# Patient Record
Sex: MALE | Race: WHITE | NOT HISPANIC OR LATINO | Employment: OTHER | URBAN - METROPOLITAN AREA
[De-identification: names, ages, dates, MRNs, and addresses within clinical notes are randomized per-mention and may not be internally consistent; named-entity substitution may affect disease eponyms.]

---

## 2017-01-23 DIAGNOSIS — N13.8 BPH WITH URINARY OBSTRUCTION: Primary | ICD-10-CM

## 2017-01-23 DIAGNOSIS — N40.1 BPH WITH URINARY OBSTRUCTION: Primary | ICD-10-CM

## 2017-03-10 ENCOUNTER — LAB VISIT (OUTPATIENT)
Dept: LAB | Facility: HOSPITAL | Age: 67
End: 2017-03-10
Payer: MEDICARE

## 2017-03-10 DIAGNOSIS — N40.1 BPH WITH URINARY OBSTRUCTION: ICD-10-CM

## 2017-03-10 DIAGNOSIS — N13.8 BPH WITH URINARY OBSTRUCTION: ICD-10-CM

## 2017-03-10 LAB — COMPLEXED PSA SERPL-MCNC: 1.1 NG/ML

## 2017-03-10 PROCEDURE — 84153 ASSAY OF PSA TOTAL: CPT

## 2017-03-10 PROCEDURE — 36415 COLL VENOUS BLD VENIPUNCTURE: CPT

## 2017-03-15 ENCOUNTER — OFFICE VISIT (OUTPATIENT)
Dept: UROLOGY | Facility: CLINIC | Age: 67
End: 2017-03-15
Payer: MEDICARE

## 2017-03-15 VITALS
WEIGHT: 209 LBS | DIASTOLIC BLOOD PRESSURE: 83 MMHG | HEART RATE: 64 BPM | BODY MASS INDEX: 29.26 KG/M2 | SYSTOLIC BLOOD PRESSURE: 154 MMHG | HEIGHT: 71 IN | RESPIRATION RATE: 16 BRPM

## 2017-03-15 DIAGNOSIS — N40.1 BPH WITH URINARY OBSTRUCTION: Primary | ICD-10-CM

## 2017-03-15 DIAGNOSIS — N13.8 BPH WITH URINARY OBSTRUCTION: Primary | ICD-10-CM

## 2017-03-15 DIAGNOSIS — N20.0 KIDNEY STONE: ICD-10-CM

## 2017-03-15 DIAGNOSIS — Z80.42 FAMILY HISTORY OF PROSTATE CANCER: ICD-10-CM

## 2017-03-15 LAB
BILIRUB SERPL-MCNC: ABNORMAL MG/DL
BLOOD URINE, POC: ABNORMAL
COLOR, POC UA: YELLOW
GLUCOSE UR QL STRIP: ABNORMAL
KETONES UR QL STRIP: ABNORMAL
LEUKOCYTE ESTERASE URINE, POC: ABNORMAL
NITRITE, POC UA: ABNORMAL
PH, POC UA: 6
PROTEIN, POC: ABNORMAL
SPECIFIC GRAVITY, POC UA: 1.01
UROBILINOGEN, POC UA: ABNORMAL

## 2017-03-15 PROCEDURE — 99999 PR PBB SHADOW E&M-EST. PATIENT-LVL IV: CPT | Mod: PBBFAC,,, | Performed by: NURSE PRACTITIONER

## 2017-03-15 PROCEDURE — 81002 URINALYSIS NONAUTO W/O SCOPE: CPT | Mod: PBBFAC | Performed by: NURSE PRACTITIONER

## 2017-03-15 PROCEDURE — 99214 OFFICE O/P EST MOD 30 MIN: CPT | Mod: PBBFAC | Performed by: NURSE PRACTITIONER

## 2017-03-15 PROCEDURE — 99213 OFFICE O/P EST LOW 20 MIN: CPT | Mod: S$PBB,,, | Performed by: NURSE PRACTITIONER

## 2017-03-15 NOTE — MR AVS SNAPSHOT
[facility]  [address]  [facility] [address]  Phone: [phone]                  Blayne Chavis   3/15/2017 8:20 AM   Office Visit    Description:  Male : 1950   Provider:  Shaniqua Davidson NP   Department:  [facility]           Reason for Visit     Benign Prostatic Hypertrophy           Diagnoses this Visit        Comments    BPH with urinary obstruction    -  Primary     Family history of prostate cancer         Kidney stone                To Do List           Future Appointments        Provider Department Dept Phone    3/15/2017 8:20 AM KAREN [facility] [phone]      Goals (5 Years of Data)     None      Follow-Up and Disposition     Return in about 1 year (around 3/15/2018) for PSA  and KUB.      [facility]     [facility] -  Assistance  Registered nurses in the [facility] provide clinical advisement, health education, appointment booking, and other advisory services.  Call for this free service at [phone].             Medications           Message regarding Medications     Verify the changes and/or additions to your medication regime listed below are the same as discussed with your clinician today.  If any of these changes or additions are incorrect, please notify your healthcare provider.             Verify that the below list of medications is an accurate representation of the medications you are currently taking.  If none reported, the list may be blank. If incorrect, please contact your healthcare provider. Carry this list with you in case of emergency.           Current Medications     aspirin (ECOTRIN) 325 MG EC tablet Take 325 mg by mouth once daily.    atorvastatin (LIPITOR) 40 MG tablet Take 40 mg by mouth once daily.    hydrochlorothiazide (MICROZIDE) 12.5 mg capsule Take 12.5 mg by mouth every 48 hours.      losartan (COZAAR) 25 MG tablet Take 1 tablet (25 mg  "total) by mouth once daily.    naproxen (NAPROSYN) 500 MG tablet     omeprazole (PRILOSEC) 20 MG capsule Take 20 mg by mouth once daily.      senna-docusate 8.6-50 mg (PERICOLACE) 8.6-50 mg per tablet Take 1 tablet by mouth daily as needed for Constipation.    hydrocodone-acetaminophen 10-325mg (NORCO)  mg Tab     oxycodone (ROXICODONE) 5 MG immediate release tablet Take 1 - 2 tablets (5-10mg) by mouth every 4-6 hours as needed for moderate to severe pain           Clinical Reference Information           Your Vitals Were     BP Pulse Resp Height Weight BMI    154/83 (BP Location: Right arm, Patient Position: Sitting, BP Method: Automatic) 64 16 5' 11" (1.803 m) 94.8 kg (209 lb) 29.15 kg/m2      Blood Pressure          Most Recent Value    BP  (!)  154/83      Allergies as of 3/15/2017     Sulfa (Sulfonamide Antibiotics)      Immunizations Administered on Date of Encounter - 3/15/2017     None      Orders Placed During Today's Visit      Normal Orders This Visit    POCT urine dipstick without microscope     Future Labs/Procedures Expected by Expires    Prostate Specific Antigen, Diagnostic  3/15/2018 (Approximate) 4/19/2018    X-Ray Abdomen AP 1 View  3/15/2018 (Approximate) 3/15/2018      [facility] Sign-Up     Activating your [facility] account is as easy as 1-2-3!     1) Visit [email or web address], select Sign Up Now, enter this activation code and your date of birth, then select Next.  [email or web address]  Expires: 4/29/2017  8:16 AM      2) Create a username and password to use when you visit [facility] in the future and select a security question in case you lose your password and select Next.    3) Enter your e-mail address and click Sign Up!    Additional Information  If you have questions, please e-mail [email or web address] or call [phone] to talk to our [facility] staff. Remember, [facility] is NOT to be used for urgent needs. For medical emergencies, dial 911.         Instructions                     PSA   "                1.1                 03/10/2017                 PSA                  0.89                03/14/2016                 PSA                  1.1                 07/10/2014                   PSA                      0.53                06/27/2013                 PSA                      0.94                06/30/2010                 PSA                      0.3                 04/01/2008                 PSA                      0.8                 02/14/2007                           BPH (Enlarged Prostate)  The prostate is a gland at the base of the bladder. As some men get older, the prostate may get bigger in size. This problem is called benign prostatic hyperplasia (BPH). BPH puts pressure on the urethra. This is the tube that carries urine from the bladder to the penis. It may interfere with the flow of urine. It may also keep the bladder from emptying fully.    Symptoms of BPH include trouble starting urination and feeling as though the bladder isnt emptying all the way. It also includes a weak urine stream, dribbling and leaking of urine, and frequent and urgent urination (especially at night). BPH can increase the risk of urinary infections. It can also block off urine flow completely. If this occurs, a thin tube (catheter) may be passed into the bladder to help drain urine.  If symptoms are mild, no treatment may be needed right now. If symptoms are more severe, treatment is likely needed. The goal of treatment is to improve urine flow and reduce symptoms. Treatments can include medicine and procedures. Your healthcare provider will discuss treatment options with you as needed.  Home care  The following guidelines will help you care for yourself at home:  · Urinate as soon as you feel the urge. Don't try to hold your urine.  · Don't limit your fluid intake during the day. Drink 6 to 8 glasses of water or liquids a day. This prevents bacteria from building up in the bladder.  · Avoid drinking  fluids after dinner to help reduce urination during the night.  · Avoid medicines that can worsen your symptoms. These include certain cold and allergy medicines and antidepressants. Diuretics used for high blood pressure can also worsen symptoms. Talk to your doctor about the medicines you take. Other choices may work better for you.  Prostate cancer screening  BPH does not increase the risk of prostate cancer. But because prostate cancer is a common cancer in men, screening is sometimes recommended. This may help detect the cancer in its early stages when treatment is most effective. Factors that can increase the risk of prostate cancer include being -American or having a father or brother who had prostate cancer. A high-fat diet may also increase the risk of prostate cancer. Talk to your healthcare provider to see whether you should be screened for prostate cancer.  Follow-up care  Follow up with your healthcare provider, or as advised  To learn more, go to:  · National Kidney & Urologic Diseases Information Clearinghouse  kidney.niddk.nih.gov, 153.579.6589  When to seek medical advice  Call your healthcare provider right away if any of these occur:  · Fever of 100.4°F (38.0°C) or higher, or as advised  · Unable to pass urine for 8 hours  · Increasing pressure or pain in your bladder (lower abdomen)  · Blood in the urine  · Increasing low back pain, not related to injury  · Symptoms of urinary infection (increased urge to urinate, burning when passing urine, foul-smelling urine)  Date Last Reviewed: 7/1/2016  © 1740-4793 [employer]. [address] [place] [address], [place] [address]. All rights reserved. This information is not intended as a substitute for professional medical care. Always follow your healthcare professional's instructions.        Preventing Kidney Stones  If youve had a kidney stone, you may worry that youll have another. Removing or passing your stone doesnt prevent future  stones. With your doctors help, though, you can reduce your risk of forming new stones. Follow up with your doctor to help detect new stones. You may need follow-up every 3 months to a year for a lifetime.    Drink lots of water  Staying well-hydrated is the best way to reduce your risk of future stones. Drink 8 12-ounce glasses of water daily. Have 2 with each meal and 2 between meals. Try keeping a pitcher of water nearby during the day and at night.  Take medications if needed  Medications, including vitamins and minerals, may be prescribed for certain types of stones. You may want to write your doses and medication times on a calendar. Some medications decrease stone-forming chemicals in your blood. Others help prevent those chemicals from crystallizing in urine. Still others help keep a normal acid balance in your urine.  Follow your prescribed diet  Your doctor will tell you which foods contain the chemicals you should avoid. Your doctor may also suggest talking to a dietitian. He or she can help you plan meals youll enjoy. These meals wont put you at risk for future stones. You may be told to limit certain foods, depending on which type of stones youve had. You should limit the amount of salt in your food to about 2 grams a day. This will help prevent most types of kidney stones. Make sure you get an adequate amount of calcium in your diet.  For calcium oxalate stones: Limit animal protein, such as meat, eggs, and fish. Limit grapefruit juice and alcohol. Limit high-oxalate foods (such as cola, tea, chocolate, spinach, rhubarb, wheat bran, and peanuts).  For uric acid stones: Limit high-purine foods, such as mushrooms, peas, beans, anchovies, meat, poultry, shellfish, and organ meats. These foods increase uric acid production.  For cystine stones: Limit high-methionine foods (fish is the most common, but eggs and meats, also). These foods increase production of cystine.  Date Last Reviewed: 1/5/2015  ©  6317-7906 [employer]. [address] [place] [address], [place] [address]. All rights reserved. This information is not intended as a substitute for professional medical care. Always follow your healthcare professional's instructions.             Language Assistance Services     ATTENTION: Language assistance services are available, free of charge. Please call [phone].      ATENCIÓN: Si habla español, tiene a douglas disposición servicios gratuitos de asistencia lingüística. Llame al [phone].     CHÚ Ý: N?u b?n nói Ti?ng Vi?t, có các d?ch v? h? tr? ngôn ng? mi?n phí dành cho b?n. G?i s? [phone].         [facility] complies with applicable Federal civil rights laws and does not discriminate on the basis of race, color, national origin, age, disability, or sex.

## 2017-03-15 NOTE — PROGRESS NOTES
Subjective:       Patient ID: Blayne Chavis is a 66 y.o. male.    Chief Complaint: Benign Prostatic Hypertrophy (yearly psa check)    HPI Comments: Blayne Chavis is a 66 y.o. Male with history BPH and kidney stones.  Passed Ca Ox stone 201  3/8/16 KUB showed no apparent stones.    Last clinic visit was 03/14/2016 2/16/2015 with Dr. Dao    Today here for annual exam  No urinary complaints.  Pleased with how things are going;  Going fishing this weekend.    (+) family hx of PCa (father).                     PSA                  1.1                 03/10/2017                 PSA                  0.89                03/14/2016                 PSA                  1.1                 07/10/2014                   PSA                      0.53                06/27/2013                 PSA                      0.94                06/30/2010                 PSA                      0.3                 04/01/2008                 PSA                      0.8                 02/14/2007                                     Past Medical History:    Stroke                                                          Comment:Hemorrhagic stroke -2000    GERD (gastroesophageal reflux disease)                        Hypertension                                                  Sleep apnea                                                     Comment:cannot tolerate CPAP    Past Surgical History:    APPENDECTOMY                                                   BACK SURGERY                                                     Comment:laminectomy    HIP SURGERY                                      6-9-14          Comment:right    TOE SURGERY                                     Right 2/2016          Comment:hammer toe surg    Review of patient's family history indicates:    Cancer                         Father                      Comment: prostate    Stroke                         Maternal Grandmother      Stroke                          Paternal Grandfather        Social History    Marital Status:              Spouse Name:                       Years of Education:                 Number of children:               Occupational History    None on file    Social History Main Topics    Smoking Status: Never Smoker                      Smokeless Status: Never Used                        Alcohol Use: Yes           1.2 oz/week       2 Glasses of wine per week    Drug Use: No              Sexual Activity: Not on file          Other Topics            Concern    None on file    Social History Narrative    None on file        Allergies:  Sulfa (sulfonamide antibiotics)    Medications:  Current outpatient prescriptions:   aspirin (ECOTRIN) 325 MG EC tablet, Take 325 mg by mouth once daily., Disp: , Rfl:   atorvastatin (LIPITOR) 40 MG tablet, Take 40 mg by mouth once daily., Disp: , Rfl:   hydrochlorothiazide (MICROZIDE) 12.5 mg capsule, Take 12.5 mg by mouth every 48 hours.  , Disp: , Rfl:   losartan (COZAAR) 25 MG tablet, Take 1 tablet (25 mg total) by mouth once daily., Disp: 30 tablet, Rfl: 1  omeprazole (PRILOSEC) 20 MG capsule, Take 20 mg by mouth once daily.  , Disp: , Rfl:   hydrocodone-acetaminophen 10-325mg (NORCO)  mg Tab, , Disp: , Rfl: 0  naproxen (NAPROSYN) 500 MG tablet, , Disp: , Rfl: 2  oxycodone (ROXICODONE) 5 MG immediate release tablet, Take 1 - 2 tablets (5-10mg) by mouth every 4-6 hours as needed for moderate to severe pain, Disp: 32 tablet, Rfl: 0  senna-docusate 8.6-50 mg (PERICOLACE) 8.6-50 mg per tablet, Take 1 tablet by mouth daily as needed for Constipation., Disp: , Rfl:         Review of Systems   Constitutional: Negative for activity change, appetite change and fever.   HENT: Negative.  Negative for facial swelling and trouble swallowing.    Eyes: Negative.    Respiratory: Negative.  Negative for shortness of breath.    Cardiovascular: Negative.  Negative for chest pain and palpitations.    Gastrointestinal: Negative for abdominal pain, constipation, diarrhea, nausea and vomiting.   Genitourinary: Positive for nocturia. Negative for difficulty urinating, dysuria, enuresis, flank pain, frequency, genital sores, hematuria, penile pain, scrotal swelling, testicular pain and urgency.        FOS is good;  Nocturia x 1.     Musculoskeletal: Positive for arthralgias and gait problem. Negative for back pain and neck stiffness.        (R) hip surgery     Skin: Negative.  Negative for wound.   Neurological: Positive for weakness. Negative for dizziness, tremors, seizures, syncope, speech difficulty, light-headedness and headaches.        R side weakness; hx CVA   Hematological: Does not bruise/bleed easily.   Psychiatric/Behavioral: Negative for confusion and hallucinations. The patient is not nervous/anxious.        Objective:      Physical Exam   Nursing note and vitals reviewed.  Constitutional: He is oriented to person, place, and time. Vital signs are normal. He appears well-developed and well-nourished. He is active and cooperative.  Non-toxic appearance. He does not have a sickly appearance.   Urine dipped clear of infection in the office today.     HENT:   Head: Normocephalic and atraumatic.   Right Ear: External ear normal.   Left Ear: External ear normal.   Nose: Nose normal.   Mouth/Throat: Mucous membranes are normal.   Eyes: Conjunctivae and lids are normal. No scleral icterus.   Neck: Trachea normal, normal range of motion and full passive range of motion without pain. Neck supple. No JVD present. No tracheal deviation present.   Cardiovascular: Normal rate, regular rhythm, S1 normal and S2 normal.    Pulmonary/Chest: Effort normal and breath sounds normal. No respiratory distress. He exhibits no tenderness.   Abdominal: Soft. Normal appearance and bowel sounds are normal. There is no hepatosplenomegaly. There is no tenderness. There is no rebound, no guarding and no CVA tenderness.    Genitourinary: Rectum normal, testes normal and penis normal. Rectal exam shows no external hemorrhoid, no mass and no tenderness. Prostate is enlarged. Prostate is not tender. No penile tenderness.       Musculoskeletal: Normal range of motion.   Lymphadenopathy: No inguinal adenopathy noted on the right or left side.   Neurological: He is alert and oriented to person, place, and time. He has normal strength.   Skin: Skin is warm, dry and intact.     Psychiatric: He has a normal mood and affect. His behavior is normal. Judgment and thought content normal.       Assessment:       1. BPH with urinary obstruction    2. Family history of prostate cancer    3. Kidney stone        Plan:         I spent 20 minutes with the patient of which more than half was spent in direct consultation with the patient in regards to our treatment and plan.    Education and recommendations of today's plan of care including home remedies.  Diet modifications; good water intake  RTC one year with PSA and KUB

## 2017-03-15 NOTE — PATIENT INSTRUCTIONS
PSA                  1.1                 03/10/2017                 PSA                  0.89                03/14/2016                 PSA                  1.1                 07/10/2014                   PSA                      0.53                06/27/2013                 PSA                      0.94                06/30/2010                 PSA                      0.3                 04/01/2008                 PSA                      0.8                 02/14/2007                           BPH (Enlarged Prostate)  The prostate is a gland at the base of the bladder. As some men get older, the prostate may get bigger in size. This problem is called benign prostatic hyperplasia (BPH). BPH puts pressure on the urethra. This is the tube that carries urine from the bladder to the penis. It may interfere with the flow of urine. It may also keep the bladder from emptying fully.    Symptoms of BPH include trouble starting urination and feeling as though the bladder isnt emptying all the way. It also includes a weak urine stream, dribbling and leaking of urine, and frequent and urgent urination (especially at night). BPH can increase the risk of urinary infections. It can also block off urine flow completely. If this occurs, a thin tube (catheter) may be passed into the bladder to help drain urine.  If symptoms are mild, no treatment may be needed right now. If symptoms are more severe, treatment is likely needed. The goal of treatment is to improve urine flow and reduce symptoms. Treatments can include medicine and procedures. Your healthcare provider will discuss treatment options with you as needed.  Home care  The following guidelines will help you care for yourself at home:  · Urinate as soon as you feel the urge. Don't try to hold your urine.  · Don't limit your fluid intake during the day. Drink 6 to 8 glasses of water or liquids a day. This prevents bacteria from building up in the  bladder.  · Avoid drinking fluids after dinner to help reduce urination during the night.  · Avoid medicines that can worsen your symptoms. These include certain cold and allergy medicines and antidepressants. Diuretics used for high blood pressure can also worsen symptoms. Talk to your doctor about the medicines you take. Other choices may work better for you.  Prostate cancer screening  BPH does not increase the risk of prostate cancer. But because prostate cancer is a common cancer in men, screening is sometimes recommended. This may help detect the cancer in its early stages when treatment is most effective. Factors that can increase the risk of prostate cancer include being -American or having a father or brother who had prostate cancer. A high-fat diet may also increase the risk of prostate cancer. Talk to your healthcare provider to see whether you should be screened for prostate cancer.  Follow-up care  Follow up with your healthcare provider, or as advised  To learn more, go to:  · National Kidney & Urologic Diseases Information Clearinghouse  kidney.niddk.nih.gov, 631.216.1901  When to seek medical advice  Call your healthcare provider right away if any of these occur:  · Fever of 100.4°F (38.0°C) or higher, or as advised  · Unable to pass urine for 8 hours  · Increasing pressure or pain in your bladder (lower abdomen)  · Blood in the urine  · Increasing low back pain, not related to injury  · Symptoms of urinary infection (increased urge to urinate, burning when passing urine, foul-smelling urine)  Date Last Reviewed: 7/1/2016 © 2000-2016 [employer]. [address] [place] [address], [place] [address]. All rights reserved. This information is not intended as a substitute for professional medical care. Always follow your healthcare professional's instructions.        Preventing Kidney Stones  If youve had a kidney stone, you may worry that youll have another. Removing or passing your stone  doesnt prevent future stones. With your doctors help, though, you can reduce your risk of forming new stones. Follow up with your doctor to help detect new stones. You may need follow-up every 3 months to a year for a lifetime.    Drink lots of water  Staying well-hydrated is the best way to reduce your risk of future stones. Drink 8 12-ounce glasses of water daily. Have 2 with each meal and 2 between meals. Try keeping a pitcher of water nearby during the day and at night.  Take medications if needed  Medications, including vitamins and minerals, may be prescribed for certain types of stones. You may want to write your doses and medication times on a calendar. Some medications decrease stone-forming chemicals in your blood. Others help prevent those chemicals from crystallizing in urine. Still others help keep a normal acid balance in your urine.  Follow your prescribed diet  Your doctor will tell you which foods contain the chemicals you should avoid. Your doctor may also suggest talking to a dietitian. He or she can help you plan meals youll enjoy. These meals wont put you at risk for future stones. You may be told to limit certain foods, depending on which type of stones youve had. You should limit the amount of salt in your food to about 2 grams a day. This will help prevent most types of kidney stones. Make sure you get an adequate amount of calcium in your diet.  For calcium oxalate stones: Limit animal protein, such as meat, eggs, and fish. Limit grapefruit juice and alcohol. Limit high-oxalate foods (such as cola, tea, chocolate, spinach, rhubarb, wheat bran, and peanuts).  For uric acid stones: Limit high-purine foods, such as mushrooms, peas, beans, anchovies, meat, poultry, shellfish, and organ meats. These foods increase uric acid production.  For cystine stones: Limit high-methionine foods (fish is the most common, but eggs and meats, also). These foods increase production of cystine.  Date Last  Reviewed: 1/5/2015  © 4221-8122 [employer], [employer]. [address] [place] [address], [place] [address]. All rights reserved. This information is not intended as a substitute for professional medical care. Always follow your healthcare professional's instructions.

## 2018-03-08 ENCOUNTER — HOSPITAL ENCOUNTER (OUTPATIENT)
Dept: RADIOLOGY | Facility: HOSPITAL | Age: 68
Discharge: HOME | End: 2018-03-08
Attending: NURSE PRACTITIONER
Payer: MEDICARE

## 2018-03-08 DIAGNOSIS — N20.0 KIDNEY STONE: ICD-10-CM

## 2018-03-08 PROCEDURE — 74018 RADEX ABDOMEN 1 VIEW: CPT | Mod: TC,PO

## 2018-03-08 PROCEDURE — 74018 RADEX ABDOMEN 1 VIEW: CPT | Mod: 26,,, | Performed by: RADIOLOGY

## 2018-04-03 ENCOUNTER — OFFICE VISIT (OUTPATIENT)
Dept: UROLOGY | Facility: CLINIC | Age: 68
End: 2018-04-03
Payer: MEDICARE

## 2018-04-03 VITALS
HEART RATE: 66 BPM | SYSTOLIC BLOOD PRESSURE: 151 MMHG | BODY MASS INDEX: 29.26 KG/M2 | DIASTOLIC BLOOD PRESSURE: 76 MMHG | WEIGHT: 209 LBS | HEIGHT: 71 IN

## 2018-04-03 DIAGNOSIS — N40.1 BPH WITH URINARY OBSTRUCTION: Primary | ICD-10-CM

## 2018-04-03 DIAGNOSIS — N20.0 KIDNEY STONES: ICD-10-CM

## 2018-04-03 DIAGNOSIS — Z80.42 FAMILY HISTORY OF PROSTATE CANCER: ICD-10-CM

## 2018-04-03 DIAGNOSIS — N13.8 BPH WITH URINARY OBSTRUCTION: Primary | ICD-10-CM

## 2018-04-03 LAB
BILIRUB SERPL-MCNC: NORMAL MG/DL
BLOOD URINE, POC: NORMAL
COLOR, POC UA: YELLOW
GLUCOSE UR QL STRIP: NORMAL
KETONES UR QL STRIP: NORMAL
LEUKOCYTE ESTERASE URINE, POC: NORMAL
NITRITE, POC UA: NORMAL
PH, POC UA: 5
PROTEIN, POC: NORMAL
SPECIFIC GRAVITY, POC UA: 1.02
UROBILINOGEN, POC UA: NORMAL

## 2018-04-03 PROCEDURE — 99214 OFFICE O/P EST MOD 30 MIN: CPT | Mod: PBBFAC | Performed by: NURSE PRACTITIONER

## 2018-04-03 PROCEDURE — 99214 OFFICE O/P EST MOD 30 MIN: CPT | Mod: S$PBB,,, | Performed by: NURSE PRACTITIONER

## 2018-04-03 PROCEDURE — 99999 PR PBB SHADOW E&M-EST. PATIENT-LVL IV: CPT | Mod: PBBFAC,,, | Performed by: NURSE PRACTITIONER

## 2018-04-03 PROCEDURE — 81002 URINALYSIS NONAUTO W/O SCOPE: CPT | Mod: PBBFAC | Performed by: NURSE PRACTITIONER

## 2018-04-03 NOTE — PROGRESS NOTES
Subjective:       Patient ID: Blayne Chavis is a 67 y.o. male.    Chief Complaint: Annual Exam    Blayne Chavis is a 66 y.o. Male with history BPH and kidney stones.  Passed Ca Ox stone 201  3/8/16 KUB showed no apparent stones.    Last clinic visit was 03/15/2017  2/16/2015 with Dr. Dao    Today here for annual exam  No urinary complaints.  Pleased with how things are going;  Going fishing this weekend.    (+) family hx of PCa (father).                   PSA                  0.98                03/08/2018                 PSA                  1.1                 03/10/2017                 PSA                  0.89                03/14/2016                 PSA                  1.1                 07/10/2014                   PSA                      0.53                06/27/2013                 PSA                      0.94                06/30/2010                 PSA                      0.3                 04/01/2008                 PSA                      0.8                 02/14/2007                                     Past Medical History:    Stroke                                                          Comment:Hemorrhagic stroke -2000    GERD (gastroesophageal reflux disease)                        Hypertension                                                  Sleep apnea                                                     Comment:cannot tolerate CPAP    Past Surgical History:    APPENDECTOMY                                                   BACK SURGERY                                                     Comment:laminectomy    HIP SURGERY                                      6-9-14          Comment:right    TOE SURGERY                                     Right 2/2016          Comment:hammer toe surg    Review of patient's family history indicates:    Cancer                         Father                      Comment: prostate    Stroke                         Maternal Grandmother      Stroke                          Paternal Grandfather        Social History    Marital Status:              Spouse Name:                       Years of Education:                 Number of children:               Occupational History    None on file    Social History Main Topics    Smoking Status: Never Smoker                      Smokeless Status: Never Used                        Alcohol Use: Yes           1.2 oz/week       2 Glasses of wine per week    Drug Use: No              Sexual Activity: Not on file          Other Topics            Concern    None on file    Social History Narrative    None on file        Allergies:  Sulfa (sulfonamide antibiotics)    Medications:  Current outpatient prescriptions:   aspirin (ECOTRIN) 325 MG EC tablet, Take 325 mg by mouth once daily., Disp: , Rfl:   atorvastatin (LIPITOR) 40 MG tablet, Take 40 mg by mouth once daily., Disp: , Rfl:   hydrochlorothiazide (MICROZIDE) 12.5 mg capsule, Take 12.5 mg by mouth every 48 hours.  , Disp: , Rfl:   losartan (COZAAR) 25 MG tablet, Take 1 tablet (25 mg total) by mouth once daily., Disp: 30 tablet, Rfl: 1  omeprazole (PRILOSEC) 20 MG capsule, Take 20 mg by mouth once daily.  , Disp: , Rfl:   hydrocodone-acetaminophen 10-325mg (NORCO)  mg Tab, , Disp: , Rfl: 0  naproxen (NAPROSYN) 500 MG tablet, , Disp: , Rfl: 2  oxycodone (ROXICODONE) 5 MG immediate release tablet, Take 1 - 2 tablets (5-10mg) by mouth every 4-6 hours as needed for moderate to severe pain, Disp: 32 tablet, Rfl: 0  senna-docusate 8.6-50 mg (PERICOLACE) 8.6-50 mg per tablet, Take 1 tablet by mouth daily as needed for Constipation., Disp: , Rfl:           Review of Systems    Objective:      Physical Exam   Nursing note and vitals reviewed.  Constitutional: He is oriented to person, place, and time. He appears well-developed and well-nourished.  Non-toxic appearance. He does not have a sickly appearance.   Urine clear;     HENT:   Head: Normocephalic and  atraumatic.   Right Ear: External ear normal.   Left Ear: External ear normal.   Nose: Nose normal.   Mouth/Throat: Mucous membranes are normal.   Eyes: Conjunctivae and lids are normal. No scleral icterus.   Neck: Trachea normal, normal range of motion and full passive range of motion without pain. Neck supple. No JVD present. No tracheal deviation present.   Cardiovascular: Normal rate, S1 normal and S2 normal.    Pulmonary/Chest: Effort normal. No respiratory distress. He exhibits no tenderness.   Abdominal: Soft. Normal appearance and bowel sounds are normal. There is no hepatosplenomegaly. There is no tenderness. There is no CVA tenderness.   Genitourinary: Rectum normal, testes normal and penis normal. Rectal exam shows no external hemorrhoid, no mass and no tenderness. Prostate is enlarged. Prostate is not tender. Right testis shows no mass, no swelling and no tenderness. Left testis shows no mass, no swelling and no tenderness. No phimosis, paraphimosis, hypospadias, penile erythema or penile tenderness. No discharge found.       Musculoskeletal:   (R) arm weakness   Lymphadenopathy: No inguinal adenopathy noted on the right or left side.   Neurological: He is alert and oriented to person, place, and time. He has normal strength.   Skin: Skin is warm, dry and intact.     Psychiatric: He has a normal mood and affect. His behavior is normal. Judgment and thought content normal.       Assessment:       1. BPH with urinary obstruction    2. Kidney stones    3. Family history of prostate cancer        Plan:         I spent 25 minutes with the patient of which more than half was spent in direct consultation with the patient in regards to our treatment and plan.    Education and recommendations of today's plan of care including home remedies.  RTC one year with PSA and KUB  Diet modifications and daily exercise  \

## 2018-04-03 NOTE — PATIENT INSTRUCTIONS
PSA                  0.98                03/08/2018                 PSA                  1.1                 03/10/2017                 PSA                  0.89                03/14/2016                 PSA                  1.1                 07/10/2014                   PSA                      0.53                06/27/2013                 PSA                      0.94                06/30/2010                 PSA                      0.3                 04/01/2008                 PSA                      0.8                 02/14/2007                             BPH (Enlarged Prostate)  The prostate is a gland at the base of the bladder. As some men get older, the prostate may get bigger in size. This problem is called benign prostatic hyperplasia (BPH). BPH puts pressure on the urethra. This is the tube that carries urine from the bladder to the penis. It may interfere with the flow of urine. It may also keep the bladder from emptying fully.    Symptoms of BPH include trouble starting urination and feeling as though the bladder isnt emptying all the way. It also includes a weak urine stream, dribbling and leaking of urine, and frequent and urgent urination (especially at night). BPH can increase the risk of urinary infections. It can also block off urine flow completely. If this occurs, a thin tube (catheter) may be passed into the bladder to help drain urine.  If symptoms are mild, no treatment may be needed right now. If symptoms are more severe, treatment is likely needed. The goal of treatment is to improve urine flow and reduce symptoms. Treatments can include medicine and procedures. Your healthcare provider will discuss treatment options with you as needed.  Home care  The following guidelines will help you care for yourself at home:  · Urinate as soon as you feel the urge. Don't try to hold your urine.  · Don't limit your fluid intake during the day. Drink 6 to 8 glasses of water or  liquids a day. This prevents bacteria from building up in the bladder.  · Avoid drinking fluids after dinner to help reduce urination during the night.  · Avoid medicines that can worsen your symptoms. These include certain cold and allergy medicines and antidepressants. Diuretics used for high blood pressure can also worsen symptoms. Talk to your doctor about the medicines you take. Other choices may work better for you.  Prostate cancer screening  BPH does not increase the risk of prostate cancer. But because prostate cancer is a common cancer in men, screening is sometimes recommended. This may help detect the cancer in its early stages when treatment is most effective. Factors that can increase the risk of prostate cancer include being -American or having a father or brother who had prostate cancer. A high-fat diet may also increase the risk of prostate cancer. Talk to your healthcare provider to see whether you should be screened for prostate cancer.  Follow-up care  Follow up with your healthcare provider, or as advised  To learn more, go to:  · National Kidney & Urologic Diseases Information Clearinghouse  kidney.niddk.nih.gov, 236.981.3490  When to seek medical advice  Call your healthcare provider right away if any of these occur:  · Fever of 100.4°F (38.0°C) or higher, or as advised  · Unable to pass urine for 8 hours  · Increasing pressure or pain in your bladder (lower abdomen)  · Blood in the urine  · Increasing low back pain, not related to injury  · Symptoms of urinary infection (increased urge to urinate, burning when passing urine, foul-smelling urine)  Date Last Reviewed: 7/1/2016  © 2965-9206 [employer], [employer]. [address] [place] [address], [place] [address]. All rights reserved. This information is not intended as a substitute for professional medical care. Always follow your healthcare professional's instructions.        Preventing Kidney Stones  If youve had a kidney stone, you may  worry that youll have another. Removing or passing your stone doesnt prevent future stones. But with your healthcare providers help, you can reduce your risk of forming new stones. Follow up with your healthcare provider to help find new stones. You may need follow-up every 3 months to a year for a lifetime.    Drink lots of water  Staying well-hydrated is the best way to reduce your risk of future stones. Drink 8 12-ounce glasses of water daily. Have 2 with each meal and 2 between meals. Try keeping a pitcher of water nearby during the day and at night.  Take medicines if needed  Medicines, including vitamins and minerals, may be prescribed for certain types of stones. You may want to write your doses and medicine times on a calendar. Some medicines decrease stone-forming chemicals in your blood. Others help prevent those chemicals from crystallizing in urine. Still others help keep a normal acid balance in your urine.  Follow your prescribed diet  Your healthcare provider will tell you which foods contain the chemicals you should avoid. Your healthcare provider may also suggest talking to a dietitian. He or she can help you plan meals youll enjoy. These meals wont put you at risk for future stones. You may be told to limit certain foods, depending on which type of stones youve had. You should limit the amount of salt in your food to about 2 grams a day. This will help prevent most types of kidney stones. Make sure you get an adequate amount of calcium in your diet.  For calcium oxalate stones: Limit animal protein, such as meat, eggs, and fish. Limit grapefruit juice and alcohol. Limit high-oxalate foods (such as cola, tea, chocolate, spinach, rhubarb, wheat bran, and peanuts).  For uric acid stones: Limit high-purine foods, such as mushrooms, peas, beans, anchovies, meat, poultry, shellfish, and organ meats. These foods increase uric acid production.  For cystine stones: Limit high-methionine foods (fish is  the most common, but eggs and meats, also). These foods increase production of cystine.  Date Last Reviewed: 2/1/2017 © 2000-2017 [employer]. [address] [place] [address], [place] [address]. All rights reserved. This information is not intended as a substitute for professional medical care. Always follow your healthcare professional's instructions.        Understanding Kidney Stones  Your kidneys are bean-shaped organs. They help filter extra salts, waste, and water from your body. You need to drink enough water every day to help flush the extra salts into your urine.     What are kidney stones?  Kidney stones are made up of chemical crystals that separate out from urine. These crystals clump together to make stones. They form in the calyx of the kidney. They may stay in the kidney or move into the urinary tract.   Why kidney stones form  Kidneys form stones for many reasons. If you dont drink enough water, for instance, you wont have enough urine to dilute chemicals. Then the chemicals may form crystals, which can develop into stones. Here are some reasons why kidney stones form:  · Fluid loss (dehydration). This can concentrate urine, causing stones to form.  · Certain foods. Some foods contain large amounts of the chemicals that sometimes crystallize into stones. Eating foods that contain a lot of meat or salt can lead to more kidney stones.  · Kidney infections. These infections foster stones by slowing urine flow or changing the acid balance of your urine.  · Family history. If family members have had kidney stones, youre more likely to have them, too.  · A lack of certain substances in your urine. Some substances can help protect you from forming stones. If you dont have enough of these in your urine, stone formation can increase.  Where stones form  Stones begin in the cup-shaped part of the kidney (calyx). Some stay in the calyx and grow. Others move into the kidney, pelvis, or into the ureter.  There they can lodge, block the flow of urine, and cause pain.  Symptoms  Many stones cause sudden and severe pain and bloody urine. Others cause nausea or frequent, burning urination. Symptoms often depend on your stones size and location. Fever may indicate a serious infection. Call your healthcare provider right away if you develop a fever.  Date Last Reviewed: 1/1/2017  © 9553-0723 [employer], [employer]. [address] [place] [address], [place] [address]. All rights reserved. This information is not intended as a substitute for professional medical care. Always follow your healthcare professional's instructions.

## 2019-03-27 ENCOUNTER — HOSPITAL ENCOUNTER (OUTPATIENT)
Dept: RADIOLOGY | Facility: HOSPITAL | Age: 69
Discharge: HOME | End: 2019-03-27
Attending: NURSE PRACTITIONER
Payer: MEDICARE

## 2019-03-27 DIAGNOSIS — N20.0 KIDNEY STONES: ICD-10-CM

## 2019-03-27 PROCEDURE — 74018 RADEX ABDOMEN 1 VIEW: CPT | Mod: 26,,, | Performed by: RADIOLOGY

## 2019-03-27 PROCEDURE — 74018 RADEX ABDOMEN 1 VIEW: CPT | Mod: TC,PO

## 2019-03-27 PROCEDURE — 74018 XR ABDOMEN AP 1 VIEW: ICD-10-PCS | Mod: 26,,, | Performed by: RADIOLOGY

## 2019-04-03 ENCOUNTER — OFFICE VISIT (OUTPATIENT)
Dept: UROLOGY | Facility: CLINIC | Age: 69
End: 2019-04-03
Payer: MEDICARE

## 2019-04-03 VITALS
HEIGHT: 71 IN | BODY MASS INDEX: 30.49 KG/M2 | DIASTOLIC BLOOD PRESSURE: 82 MMHG | WEIGHT: 217.81 LBS | SYSTOLIC BLOOD PRESSURE: 144 MMHG

## 2019-04-03 DIAGNOSIS — Z87.442 HISTORY OF KIDNEY STONES: ICD-10-CM

## 2019-04-03 DIAGNOSIS — Z12.5 SCREENING FOR PROSTATE CANCER: ICD-10-CM

## 2019-04-03 DIAGNOSIS — N40.0 ENLARGED PROSTATE WITHOUT LOWER URINARY TRACT SYMPTOMS (LUTS): ICD-10-CM

## 2019-04-03 DIAGNOSIS — N40.0 BENIGN PROSTATIC HYPERPLASIA, UNSPECIFIED WHETHER LOWER URINARY TRACT SYMPTOMS PRESENT: Primary | ICD-10-CM

## 2019-04-03 PROCEDURE — 99999 PR PBB SHADOW E&M-EST. PATIENT-LVL III: ICD-10-PCS | Mod: PBBFAC,,, | Performed by: NURSE PRACTITIONER

## 2019-04-03 PROCEDURE — 81002 URINALYSIS NONAUTO W/O SCOPE: CPT | Mod: PBBFAC | Performed by: NURSE PRACTITIONER

## 2019-04-03 PROCEDURE — 99213 OFFICE O/P EST LOW 20 MIN: CPT | Mod: PBBFAC | Performed by: NURSE PRACTITIONER

## 2019-04-03 PROCEDURE — 99999 PR PBB SHADOW E&M-EST. PATIENT-LVL III: CPT | Mod: PBBFAC,,, | Performed by: NURSE PRACTITIONER

## 2019-04-03 PROCEDURE — 99214 OFFICE O/P EST MOD 30 MIN: CPT | Mod: S$PBB,,, | Performed by: NURSE PRACTITIONER

## 2019-04-03 PROCEDURE — 99214 PR OFFICE/OUTPT VISIT, EST, LEVL IV, 30-39 MIN: ICD-10-PCS | Mod: S$PBB,,, | Performed by: NURSE PRACTITIONER

## 2019-04-03 NOTE — PROGRESS NOTES
Subjective:       Patient ID: Blayne Chavis is a 68 y.o. male.    Chief Complaint: BPH, and PSA    HPI   Blayne Chavis is a 68 y.o. male new patient to me (records of past medical, family and social history personally reviewed by me), w/ h/o stroke, HTN, HLD, BPH, kidney stones (CaOx stone 12/12/12). Positive family h/o prostate CA in father. Pt last seen in clinic w/ Davidson, NP 4/3/18 for f/u BPH and kidney stones.    Today pt returns to clinic for f/u BPH, and PSA. He reports feeling well since last visit. He denies irritative/obstructive urinary sxs. Reports FOS remain strong. Nocturia 0-1/night.    Review of Systems   Constitutional: Negative for chills and fever.   Eyes: Negative for visual disturbance.   Respiratory: Negative for shortness of breath.    Cardiovascular: Negative for chest pain and palpitations.   Gastrointestinal: Negative for abdominal pain, constipation, nausea and vomiting.   Endocrine: Negative for polyuria.   Genitourinary: Negative for decreased urine volume, difficulty urinating, discharge, dysuria, flank pain, frequency, hematuria, scrotal swelling, testicular pain and urgency.   Musculoskeletal: Negative for back pain.   Skin: Negative for rash.   Neurological: Negative for dizziness and headaches.   Psychiatric/Behavioral: Negative for behavioral problems.       Objective:       UA dip in clinic today was unremarkable.    4/3/18 KUB was unremarkable for calculi.    Lab Results   Component Value Date    PSA 0.53 06/27/2013    PSA 0.94 06/30/2010    PSA 0.3 04/01/2008    PSA 0.8 02/14/2007    PSADIAG 1.3 03/27/2019    PSADIAG 0.98 03/08/2018    PSADIAG 1.1 03/10/2017    PSADIAG 0.89 03/14/2016    PSADIAG 1.1 07/10/2014     Physical Exam   Vitals reviewed.  Constitutional: He is oriented to person, place, and time. He appears well-developed and well-nourished. No distress.   HENT:   Head: Normocephalic.   Eyes: Conjunctivae are normal. No scleral icterus.   Neck: Normal range  of motion.   Pulmonary/Chest: Effort normal. No respiratory distress.   Abdominal: Soft. He exhibits no distension. There is no tenderness. There is no rebound. Hernia confirmed negative in the right inguinal area and confirmed negative in the left inguinal area.   Genitourinary: Testes normal and penis normal. Rectal exam shows no external hemorrhoid. Prostate is enlarged. Prostate is not tender. Right testis shows no mass, no swelling and no tenderness. Left testis shows no mass, no swelling and no tenderness.   Genitourinary Comments: The prostate is enlarged, smooth, symmetrical, without nodule or induration. The seminal vesicles were normal and nonpalpable. The prostate was not tender or boggy. Rectal tone was normal no rectal mass was palpable.     Musculoskeletal: He exhibits no edema.   Lymphadenopathy: No inguinal adenopathy noted on the right or left side.   Neurological: He is alert and oriented to person, place, and time.   Skin: Skin is warm.     Psychiatric: He has a normal mood and affect. His behavior is normal.       Assessment:         1. Benign prostatic hyperplasia, unspecified whether lower urinary tract symptoms present    2. History of kidney stones    3. Screening for prostate cancer        Plan:       I spent 30 minutes with the patient of which more than half was spent in direct consultation with the patient in regards to our treatment and plan.    Discussed and reviewed BPH. Reviewed lower urinary anatomy. Discussed prostate normally enlarges to some degree in all men with advancing age, but not all men require treatment. Men with BPH may or may not have symptoms. Urinary symptoms include irritative (i.e., frequency, urgency) and/or obstructive (i.e., hesitancy, slow stream) symptoms. Pt remain asymptomatic at this time, therefore medication is not clinically indicated.    Discussed and reviewed the natural history of renal/ureteral calculi and prevention of calculi. Discussed and reviewed  importance of prevention w/ proper hydration (2-3L/d), low sodium diet, low oxalate/animal protein intake, balanced nutritional calcium intake, and increase citrate intake w/ fresh lemon. Patient expressed and verbalized understanding.  Patient understands to contact clinic or report to local ED if clinic is closed for fevers, rigors, unmanageable pain, intractable nausea/vomiting and/or intolerable irritative voiding symptoms.    Discussed and reviewed most recent PSA value - w/in normal range. Reviewed prostate CA screening w/ PSA, and RAQUEL. Reviewed benefits, potential burdens, risks and/or harms related to screening.    F/u 1 yr as scheduled w/ PSA, FABIO, and KUB prior to visit.    Education sheets provided.    Patient verbalized and expressed understanding, and agree w/ plan.

## 2019-04-03 NOTE — PATIENT INSTRUCTIONS
Preventing Kidney Stones  If youve had a kidney stone, you may worry that youll have another. Removing or passing your stone doesnt prevent future stones. But with your healthcare providers help, you can reduce your risk of forming new stones. Follow up with your healthcare provider to help find new stones. You may need follow-up every 3 months to a year for a lifetime.    Drink lots of water  Staying well-hydrated is the best way to reduce your risk of future stones. Drink 8 12-ounce glasses of water daily. Have 2 with each meal and 2 between meals. Try keeping a pitcher of water nearby during the day and at night.  Take medicines if needed  Medicines, including vitamins and minerals, may be prescribed for certain types of stones. You may want to write your doses and medicine times on a calendar. Some medicines decrease stone-forming chemicals in your blood. Others help prevent those chemicals from crystallizing in urine. Still others help keep a normal acid balance in your urine.  Follow your prescribed diet  Your healthcare provider will tell you which foods contain the chemicals you should avoid. Your healthcare provider may also suggest talking to a dietitian. He or she can help you plan meals youll enjoy. These meals wont put you at risk for future stones. You may be told to limit certain foods, depending on which type of stones youve had. You should limit the amount of salt in your food to about 2 grams a day. This will help prevent most types of kidney stones. Make sure you get an adequate amount of calcium in your diet.  For calcium oxalate stones: Limit animal protein, such as meat, eggs, and fish. Limit grapefruit juice and alcohol. Limit high-oxalate foods (such as cola, tea, chocolate, spinach, rhubarb, wheat bran, and peanuts).  For uric acid stones: Limit high-purine foods, such as mushrooms, peas, beans, anchovies, meat, poultry, shellfish, and organ meats. These foods increase uric acid  production.  For cystine stones: Limit high-methionine foods (fish is the most common, but eggs and meats, also). These foods increase production of cystine.  Date Last Reviewed: 2/1/2017 © 2000-2017 [employer]. [address] [place] [address], [place] [address]. All rights reserved. This information is not intended as a substitute for professional medical care. Always follow your healthcare professional's instructions.        Understanding Kidney Stones  Your kidneys are bean-shaped organs. They help filter extra salts, waste, and water from your body. You need to drink enough water every day to help flush the extra salts into your urine.     What are kidney stones?  Kidney stones are made up of chemical crystals that separate out from urine. These crystals clump together to make stones. They form in the calyx of the kidney. They may stay in the kidney or move into the urinary tract.   Why kidney stones form  Kidneys form stones for many reasons. If you dont drink enough water, for instance, you wont have enough urine to dilute chemicals. Then the chemicals may form crystals, which can develop into stones. Here are some reasons why kidney stones form:  · Fluid loss (dehydration). This can concentrate urine, causing stones to form.  · Certain foods. Some foods contain large amounts of the chemicals that sometimes crystallize into stones. Eating foods that contain a lot of meat or salt can lead to more kidney stones.  · Kidney infections. These infections foster stones by slowing urine flow or changing the acid balance of your urine.  · Family history. If family members have had kidney stones, youre more likely to have them, too.  · A lack of certain substances in your urine. Some substances can help protect you from forming stones. If you dont have enough of these in your urine, stone formation can increase.  Where stones form  Stones begin in the cup-shaped part of the kidney (calyx). Some stay in the  calyx and grow. Others move into the kidney, pelvis, or into the ureter. There they can lodge, block the flow of urine, and cause pain.  Symptoms  Many stones cause sudden and severe pain and bloody urine. Others cause nausea or frequent, burning urination. Symptoms often depend on your stones size and location. Fever may indicate a serious infection. Call your healthcare provider right away if you develop a fever.  Date Last Reviewed: 1/1/2017 © 2000-2017 [employer]. [address] [place] [id number]. All rights reserved. This information is not intended as a substitute for professional medical care. Always follow your healthcare professional's instructions.        Prostate-Specific Antigen (PSA)  Does this test have other names?  PSA  What is this test?  This test measures the level of prostate-specific antigen (PSA) in your blood.  The cells of the prostate gland make the protein called PSA. Men normally have low levels of PSA. If your PSA levels start to rise, it could mean you have prostate cancer, benign prostate conditions, inflammation, or an infection.  PSA testing is controversial because the U.S. Preventative Services Task Force discourages men who don't have any symptoms of prostate cancer from being screened. The task force says that PSA test results can lead to treating small cancers that would never become life-threatening.  But the American Cancer Society believes men should be told the risks and benefits of PSA testing and allowed to make their own decision about if and when to be screened.  The American Urologic Society says that PSA screening is most important between the ages of 55 and 69. If you have a brother or father with prostate cancer or you are , you should start testing at age 40.   Why do I need this test?  You may have this test if you are 50 or older and your healthcare provider wants to screen you for prostate cancer. Some providers recommend  screening at age 40 or 45 if you have a family history of prostate cancer or other risk factors.  You may also have this test if you have already been diagnosed with prostate cancer, so that your healthcare provider can monitor your treatment and see whether your cancer has come back.  What other tests might I have along with this test?  Your healthcare provider may also do a digital rectal exam (RAQUEL). A RAQUEL is a physical exam of the prostate, not a lab test. For the exam, your provider will place a gloved finger in your rectum and feel the prostate to check for any bumps or abnormal areas. The FDA recommends that a RAQUEL be done along with a PSA test.  What do my test results mean?  Many things may affect your lab test results. These include the method each lab uses to do the test. Even if your test results are different from the normal value, you may not have a problem. To learn what the results mean for you, talk with your healthcare provider.  Results are given in nanograms per milliliter ng/mL. Normal results are below 4.0 ng/mL. A rising PSA may mean that you have cancer. But the PSA results alone won't tell your healthcare provider whether it's cancer or a benign prostate condition. If your provider suspects cancer, he or she will likely suggest that you have a biopsy of the prostate to make the diagnosis.  How is this test done?  The test requires a blood sample, which is drawn through a needle from a vein in your arm.  Does this test pose any risks?  Taking a blood sample with a needle carries risks that include bleeding, infection, bruising, or feeling dizzy. When the needle pricks your arm, you may feel a slight stinging sensation or pain. Afterward, the site may be slightly sore.  What might affect my test results?  An infection can affect your results, as can certain medicines. Riding a bicycle and ejaculation may also affect your results.  How do I get ready for this test?  You may need to abstain from  sex and not ride a bicycle within 1 to 2 days of the test. In addition, be sure your healthcare provider knows about all medicines, herbs, vitamins, and supplements you are taking. This includes medicines that don't need a prescription and any illicit drugs you may use.     © 8892-1267 [employer]. [address] [place] [address], [place] [address]. All rights reserved. This information is not intended as a substitute for professional medical care. Always follow your healthcare professional's instructions.

## 2020-03-30 ENCOUNTER — HOSPITAL ENCOUNTER (OUTPATIENT)
Dept: RADIOLOGY | Facility: HOSPITAL | Age: 70
Discharge: HOME | End: 2020-03-30
Attending: NURSE PRACTITIONER
Payer: MEDICARE

## 2020-03-30 DIAGNOSIS — Z87.442 HISTORY OF KIDNEY STONES: ICD-10-CM

## 2020-03-30 PROCEDURE — 74018 RADEX ABDOMEN 1 VIEW: CPT | Mod: TC,PO

## 2020-03-30 PROCEDURE — 74018 RADEX ABDOMEN 1 VIEW: CPT | Mod: 26,,, | Performed by: RADIOLOGY

## 2020-03-30 PROCEDURE — 74018 XR ABDOMEN AP 1 VIEW: ICD-10-PCS | Mod: 26,,, | Performed by: RADIOLOGY

## 2020-03-31 ENCOUNTER — TELEPHONE (OUTPATIENT)
Dept: UROLOGY | Facility: CLINIC | Age: 70
End: 2020-03-31

## 2020-03-31 DIAGNOSIS — N40.1 BPH WITH URINARY OBSTRUCTION: ICD-10-CM

## 2020-03-31 DIAGNOSIS — Z80.42 FAMILY HISTORY OF PROSTATE CANCER IN FATHER: ICD-10-CM

## 2020-03-31 DIAGNOSIS — Z87.442 HISTORY OF NEPHROLITHIASIS: Primary | ICD-10-CM

## 2020-03-31 DIAGNOSIS — N13.8 BPH WITH URINARY OBSTRUCTION: ICD-10-CM

## 2020-03-31 NOTE — TELEPHONE ENCOUNTER
Notified pt of unremarkable KUB findings. PSA w/in normal range. F/U in 1 year with PSA, KUB and FABIO prior. Pt voiced understanding      ----- Message from Kiel Day DNP sent at 3/31/2020  8:58 AM CDT -----  Please inform pt of unremarkable KUB findings. PSA w/in normal range. May f/u in one yr (recall) w/ PSA, KUB, and FABIO.    If pt desires virtual visit, that's an option.    Thx

## 2021-03-22 ENCOUNTER — LAB VISIT (OUTPATIENT)
Dept: LAB | Facility: HOSPITAL | Age: 71
End: 2021-03-22
Payer: MEDICARE

## 2021-03-22 DIAGNOSIS — N13.8 BPH WITH URINARY OBSTRUCTION: ICD-10-CM

## 2021-03-22 DIAGNOSIS — Z80.42 FAMILY HISTORY OF PROSTATE CANCER IN FATHER: ICD-10-CM

## 2021-03-22 DIAGNOSIS — N40.1 BPH WITH URINARY OBSTRUCTION: ICD-10-CM

## 2021-03-22 LAB — COMPLEXED PSA SERPL-MCNC: 1.2 NG/ML (ref 0–4)

## 2021-03-22 PROCEDURE — 84153 ASSAY OF PSA TOTAL: CPT | Performed by: NURSE PRACTITIONER

## 2021-03-22 PROCEDURE — 36415 COLL VENOUS BLD VENIPUNCTURE: CPT | Performed by: NURSE PRACTITIONER

## 2021-03-30 ENCOUNTER — OFFICE VISIT (OUTPATIENT)
Dept: UROLOGY | Facility: CLINIC | Age: 71
End: 2021-03-30
Payer: MEDICARE

## 2021-03-30 VITALS
HEIGHT: 71 IN | HEART RATE: 71 BPM | BODY MASS INDEX: 30.96 KG/M2 | DIASTOLIC BLOOD PRESSURE: 97 MMHG | SYSTOLIC BLOOD PRESSURE: 159 MMHG | WEIGHT: 221.13 LBS

## 2021-03-30 DIAGNOSIS — I10 HYPERTENSION, UNSPECIFIED TYPE: Chronic | ICD-10-CM

## 2021-03-30 DIAGNOSIS — Z86.73 HISTORY OF STROKE: Chronic | ICD-10-CM

## 2021-03-30 DIAGNOSIS — Z80.42 FAMILY HISTORY OF PROSTATE CANCER: ICD-10-CM

## 2021-03-30 DIAGNOSIS — N20.0 KIDNEY STONE: Primary | ICD-10-CM

## 2021-03-30 PROCEDURE — 99999 PR PBB SHADOW E&M-EST. PATIENT-LVL III: CPT | Mod: PBBFAC,,, | Performed by: UROLOGY

## 2021-03-30 PROCEDURE — 99999 PR PBB SHADOW E&M-EST. PATIENT-LVL III: ICD-10-PCS | Mod: PBBFAC,,, | Performed by: UROLOGY

## 2021-03-30 PROCEDURE — 99213 PR OFFICE/OUTPT VISIT, EST, LEVL III, 20-29 MIN: ICD-10-PCS | Mod: S$PBB,,, | Performed by: UROLOGY

## 2021-03-30 PROCEDURE — 99213 OFFICE O/P EST LOW 20 MIN: CPT | Mod: S$PBB,,, | Performed by: UROLOGY

## 2021-03-30 PROCEDURE — 99213 OFFICE O/P EST LOW 20 MIN: CPT | Mod: PBBFAC | Performed by: UROLOGY

## 2021-03-31 ENCOUNTER — HOSPITAL ENCOUNTER (OUTPATIENT)
Dept: RADIOLOGY | Facility: HOSPITAL | Age: 71
Discharge: HOME | End: 2021-03-31
Attending: UROLOGY
Payer: MEDICARE

## 2021-03-31 DIAGNOSIS — N20.0 KIDNEY STONE: ICD-10-CM

## 2021-03-31 PROCEDURE — 76770 US KIDNEY: ICD-10-PCS | Mod: 26,,, | Performed by: RADIOLOGY

## 2021-03-31 PROCEDURE — 76770 US EXAM ABDO BACK WALL COMP: CPT | Mod: 26,,, | Performed by: RADIOLOGY

## 2021-03-31 PROCEDURE — 76770 US EXAM ABDO BACK WALL COMP: CPT | Mod: TC

## 2021-03-31 PROCEDURE — 74018 XR ABDOMEN AP 1 VIEW: ICD-10-PCS | Mod: 26,,, | Performed by: RADIOLOGY

## 2021-03-31 PROCEDURE — 74018 RADEX ABDOMEN 1 VIEW: CPT | Mod: TC

## 2021-03-31 PROCEDURE — 74018 RADEX ABDOMEN 1 VIEW: CPT | Mod: 26,,, | Performed by: RADIOLOGY

## 2021-04-16 ENCOUNTER — PATIENT MESSAGE (OUTPATIENT)
Dept: RESEARCH | Facility: HOSPITAL | Age: 71
End: 2021-04-16

## 2021-04-19 ENCOUNTER — PATIENT MESSAGE (OUTPATIENT)
Dept: UROLOGY | Facility: CLINIC | Age: 71
End: 2021-04-19

## 2021-05-06 ENCOUNTER — TELEPHONE (OUTPATIENT)
Dept: UROLOGY | Facility: CLINIC | Age: 71
End: 2021-05-06

## 2021-05-10 ENCOUNTER — HOSPITAL ENCOUNTER (OUTPATIENT)
Dept: RADIOLOGY | Facility: HOSPITAL | Age: 71
Discharge: HOME | End: 2021-05-10
Attending: UROLOGY
Payer: MEDICARE

## 2021-05-10 ENCOUNTER — OFFICE VISIT (OUTPATIENT)
Dept: UROLOGY | Facility: CLINIC | Age: 71
End: 2021-05-10
Payer: MEDICARE

## 2021-05-10 VITALS
HEIGHT: 71 IN | BODY MASS INDEX: 29.91 KG/M2 | HEART RATE: 95 BPM | WEIGHT: 213.63 LBS | DIASTOLIC BLOOD PRESSURE: 93 MMHG | SYSTOLIC BLOOD PRESSURE: 163 MMHG

## 2021-05-10 DIAGNOSIS — N20.0 RENAL CALCULI: Primary | ICD-10-CM

## 2021-05-10 DIAGNOSIS — N22 CALCULUS OF URINARY TRACT IN DISEASES CLASSIFIED ELSEWHERE: ICD-10-CM

## 2021-05-10 DIAGNOSIS — N20.0 RENAL CALCULI: ICD-10-CM

## 2021-05-10 PROCEDURE — 74018 RADEX ABDOMEN 1 VIEW: CPT | Mod: TC

## 2021-05-10 PROCEDURE — 99999 PR PBB SHADOW E&M-EST. PATIENT-LVL V: ICD-10-PCS | Mod: PBBFAC,,, | Performed by: UROLOGY

## 2021-05-10 PROCEDURE — 99215 OFFICE O/P EST HI 40 MIN: CPT | Mod: PBBFAC,25 | Performed by: UROLOGY

## 2021-05-10 PROCEDURE — 99999 PR PBB SHADOW E&M-EST. PATIENT-LVL V: CPT | Mod: PBBFAC,,, | Performed by: UROLOGY

## 2021-05-10 PROCEDURE — 74018 RADEX ABDOMEN 1 VIEW: CPT | Mod: 26,,, | Performed by: RADIOLOGY

## 2021-05-10 PROCEDURE — 99214 OFFICE O/P EST MOD 30 MIN: CPT | Mod: S$PBB,,, | Performed by: UROLOGY

## 2021-05-10 PROCEDURE — 99214 PR OFFICE/OUTPT VISIT, EST, LEVL IV, 30-39 MIN: ICD-10-PCS | Mod: S$PBB,,, | Performed by: UROLOGY

## 2021-05-10 PROCEDURE — 74018 XR ABDOMEN AP 1 VIEW: ICD-10-PCS | Mod: 26,,, | Performed by: RADIOLOGY

## 2021-05-10 RX ORDER — HYDROCODONE BITARTRATE AND ACETAMINOPHEN 5; 325 MG/1; MG/1
1 TABLET ORAL EVERY 6 HOURS PRN
Qty: 15 TABLET | Refills: 0 | Status: SHIPPED | OUTPATIENT
Start: 2021-05-10

## 2021-05-10 RX ORDER — TAMSULOSIN HYDROCHLORIDE 0.4 MG/1
0.4 CAPSULE ORAL DAILY
Qty: 30 CAPSULE | Refills: 12 | Status: SHIPPED | OUTPATIENT
Start: 2021-05-10

## 2021-05-11 ENCOUNTER — HOSPITAL ENCOUNTER (EMERGENCY)
Facility: HOSPITAL | Age: 71
Discharge: HOME | End: 2021-05-11
Attending: EMERGENCY MEDICINE
Payer: MEDICARE

## 2021-05-11 ENCOUNTER — TELEPHONE (OUTPATIENT)
Dept: UROLOGY | Facility: CLINIC | Age: 71
End: 2021-05-11

## 2021-05-11 VITALS
WEIGHT: 210 LBS | BODY MASS INDEX: 29.4 KG/M2 | TEMPERATURE: 98 F | HEART RATE: 70 BPM | RESPIRATION RATE: 14 BRPM | HEIGHT: 71 IN | OXYGEN SATURATION: 94 % | SYSTOLIC BLOOD PRESSURE: 130 MMHG | DIASTOLIC BLOOD PRESSURE: 70 MMHG

## 2021-05-11 DIAGNOSIS — N20.1 LEFT URETERAL STONE: Primary | ICD-10-CM

## 2021-05-11 DIAGNOSIS — R10.9 LEFT FLANK PAIN: ICD-10-CM

## 2021-05-11 LAB
ALBUMIN SERPL BCP-MCNC: 3.7 G/DL (ref 3.5–5.2)
ALP SERPL-CCNC: 88 U/L (ref 55–135)
ALT SERPL W/O P-5'-P-CCNC: 38 U/L (ref 10–44)
ANION GAP SERPL CALC-SCNC: 11 MMOL/L (ref 8–16)
AST SERPL-CCNC: 21 U/L (ref 10–40)
BACTERIA #/AREA URNS AUTO: NORMAL /HPF
BASOPHILS # BLD AUTO: 0.03 K/UL (ref 0–0.2)
BASOPHILS NFR BLD: 0.4 % (ref 0–1.9)
BILIRUB SERPL-MCNC: 0.9 MG/DL (ref 0.1–1)
BILIRUB UR QL STRIP: NEGATIVE
BUN SERPL-MCNC: 17 MG/DL (ref 8–23)
CALCIUM SERPL-MCNC: 9.6 MG/DL (ref 8.7–10.5)
CHLORIDE SERPL-SCNC: 103 MMOL/L (ref 95–110)
CLARITY UR REFRACT.AUTO: CLEAR
CO2 SERPL-SCNC: 22 MMOL/L (ref 23–29)
COLOR UR AUTO: YELLOW
CREAT SERPL-MCNC: 1.1 MG/DL (ref 0.5–1.4)
DIFFERENTIAL METHOD: ABNORMAL
EOSINOPHIL # BLD AUTO: 0.1 K/UL (ref 0–0.5)
EOSINOPHIL NFR BLD: 0.6 % (ref 0–8)
ERYTHROCYTE [DISTWIDTH] IN BLOOD BY AUTOMATED COUNT: 12.9 % (ref 11.5–14.5)
EST. GFR  (AFRICAN AMERICAN): >60 ML/MIN/1.73 M^2
EST. GFR  (NON AFRICAN AMERICAN): >60 ML/MIN/1.73 M^2
GLUCOSE SERPL-MCNC: 168 MG/DL (ref 70–110)
GLUCOSE UR QL STRIP: ABNORMAL
HCT VFR BLD AUTO: 42.6 % (ref 40–54)
HCV AB SERPL QL IA: NEGATIVE
HGB BLD-MCNC: 14.4 G/DL (ref 14–18)
HGB UR QL STRIP: ABNORMAL
IMM GRANULOCYTES # BLD AUTO: 0.01 K/UL (ref 0–0.04)
IMM GRANULOCYTES NFR BLD AUTO: 0.1 % (ref 0–0.5)
KETONES UR QL STRIP: NEGATIVE
LEUKOCYTE ESTERASE UR QL STRIP: NEGATIVE
LIPASE SERPL-CCNC: 16 U/L (ref 4–60)
LYMPHOCYTES # BLD AUTO: 0.7 K/UL (ref 1–4.8)
LYMPHOCYTES NFR BLD: 9.3 % (ref 18–48)
MCH RBC QN AUTO: 29.2 PG (ref 27–31)
MCHC RBC AUTO-ENTMCNC: 33.8 G/DL (ref 32–36)
MCV RBC AUTO: 86 FL (ref 82–98)
MICROSCOPIC COMMENT: NORMAL
MONOCYTES # BLD AUTO: 0.8 K/UL (ref 0.3–1)
MONOCYTES NFR BLD: 10.4 % (ref 4–15)
NEUTROPHILS # BLD AUTO: 6.1 K/UL (ref 1.8–7.7)
NEUTROPHILS NFR BLD: 79.2 % (ref 38–73)
NITRITE UR QL STRIP: NEGATIVE
NRBC BLD-RTO: 0 /100 WBC
PH UR STRIP: 6 [PH] (ref 5–8)
PLATELET # BLD AUTO: 159 K/UL (ref 150–450)
PMV BLD AUTO: 10.2 FL (ref 9.2–12.9)
POTASSIUM SERPL-SCNC: 4.3 MMOL/L (ref 3.5–5.1)
PROT SERPL-MCNC: 7 G/DL (ref 6–8.4)
PROT UR QL STRIP: NEGATIVE
RBC # BLD AUTO: 4.93 M/UL (ref 4.6–6.2)
RBC #/AREA URNS AUTO: 1 /HPF (ref 0–4)
SODIUM SERPL-SCNC: 136 MMOL/L (ref 136–145)
SP GR UR STRIP: 1.01 (ref 1–1.03)
URN SPEC COLLECT METH UR: ABNORMAL
WBC # BLD AUTO: 7.72 K/UL (ref 3.9–12.7)
WBC #/AREA URNS AUTO: 1 /HPF (ref 0–5)

## 2021-05-11 PROCEDURE — 81001 URINALYSIS AUTO W/SCOPE: CPT | Performed by: EMERGENCY MEDICINE

## 2021-05-11 PROCEDURE — 80053 COMPREHEN METABOLIC PANEL: CPT | Performed by: EMERGENCY MEDICINE

## 2021-05-11 PROCEDURE — 86803 HEPATITIS C AB TEST: CPT | Performed by: EMERGENCY MEDICINE

## 2021-05-11 PROCEDURE — 99285 EMERGENCY DEPT VISIT HI MDM: CPT | Mod: 25

## 2021-05-11 PROCEDURE — 99284 EMERGENCY DEPT VISIT MOD MDM: CPT | Mod: ,,, | Performed by: EMERGENCY MEDICINE

## 2021-05-11 PROCEDURE — 83690 ASSAY OF LIPASE: CPT | Performed by: EMERGENCY MEDICINE

## 2021-05-11 PROCEDURE — 63600175 PHARM REV CODE 636 W HCPCS: Performed by: EMERGENCY MEDICINE

## 2021-05-11 PROCEDURE — 99284 PR EMERGENCY DEPT VISIT,LEVEL IV: ICD-10-PCS | Mod: ,,, | Performed by: EMERGENCY MEDICINE

## 2021-05-11 PROCEDURE — 96374 THER/PROPH/DIAG INJ IV PUSH: CPT

## 2021-05-11 PROCEDURE — 85025 COMPLETE CBC W/AUTO DIFF WBC: CPT | Performed by: EMERGENCY MEDICINE

## 2021-05-11 RX ORDER — KETOROLAC TROMETHAMINE 30 MG/ML
10 INJECTION, SOLUTION INTRAMUSCULAR; INTRAVENOUS
Status: COMPLETED | OUTPATIENT
Start: 2021-05-11 | End: 2021-05-11

## 2021-05-11 RX ORDER — HYDROCODONE BITARTRATE AND ACETAMINOPHEN 5; 325 MG/1; MG/1
1 TABLET ORAL EVERY 4 HOURS PRN
Qty: 8 TABLET | Refills: 0 | Status: SHIPPED | OUTPATIENT
Start: 2021-05-11

## 2021-05-11 RX ORDER — ONDANSETRON 4 MG/1
4 TABLET, ORALLY DISINTEGRATING ORAL EVERY 8 HOURS PRN
Qty: 10 TABLET | Refills: 0 | Status: SHIPPED | OUTPATIENT
Start: 2021-05-11

## 2021-05-11 RX ORDER — IBUPROFEN 400 MG/1
400 TABLET ORAL EVERY 6 HOURS PRN
Qty: 20 TABLET | Refills: 0 | Status: SHIPPED | OUTPATIENT
Start: 2021-05-11

## 2021-05-11 RX ADMIN — KETOROLAC TROMETHAMINE 10 MG: 30 INJECTION, SOLUTION INTRAMUSCULAR; INTRAVENOUS at 12:05

## 2021-05-12 ENCOUNTER — PES CALL (OUTPATIENT)
Dept: ADMINISTRATIVE | Facility: CLINIC | Age: 71
End: 2021-05-12

## 2021-10-08 ENCOUNTER — OFFICE VISIT (OUTPATIENT)
Dept: ORTHOPEDICS | Facility: CLINIC | Age: 71
End: 2021-10-08
Payer: MEDICARE

## 2021-10-08 ENCOUNTER — HOSPITAL ENCOUNTER (OUTPATIENT)
Dept: RADIOLOGY | Facility: HOSPITAL | Age: 71
Discharge: HOME | End: 2021-10-08
Attending: NURSE PRACTITIONER
Payer: MEDICARE

## 2021-10-08 DIAGNOSIS — M25.559 HIP PAIN: Primary | ICD-10-CM

## 2021-10-08 DIAGNOSIS — M25.559 HIP PAIN: ICD-10-CM

## 2021-10-08 DIAGNOSIS — M54.31 SCIATICA OF RIGHT SIDE: ICD-10-CM

## 2021-10-08 PROCEDURE — 72170 X-RAY EXAM OF PELVIS: CPT | Mod: TC

## 2021-10-08 PROCEDURE — 99999 PR PBB SHADOW E&M-EST. PATIENT-LVL II: ICD-10-PCS | Mod: PBBFAC,,, | Performed by: NURSE PRACTITIONER

## 2021-10-08 PROCEDURE — 72170 X-RAY EXAM OF PELVIS: CPT | Mod: 26,,, | Performed by: RADIOLOGY

## 2021-10-08 PROCEDURE — 99212 OFFICE O/P EST SF 10 MIN: CPT | Mod: PBBFAC | Performed by: NURSE PRACTITIONER

## 2021-10-08 PROCEDURE — 99203 OFFICE O/P NEW LOW 30 MIN: CPT | Mod: S$PBB,,, | Performed by: NURSE PRACTITIONER

## 2021-10-08 PROCEDURE — 99999 PR PBB SHADOW E&M-EST. PATIENT-LVL II: CPT | Mod: PBBFAC,,, | Performed by: NURSE PRACTITIONER

## 2021-10-08 PROCEDURE — 99203 PR OFFICE/OUTPT VISIT, NEW, LEVL III, 30-44 MIN: ICD-10-PCS | Mod: S$PBB,,, | Performed by: NURSE PRACTITIONER

## 2021-10-08 PROCEDURE — 72170 XR PELVIS ROUTINE AP: ICD-10-PCS | Mod: 26,,, | Performed by: RADIOLOGY

## 2021-10-08 RX ORDER — METHYLPREDNISOLONE 4 MG/1
TABLET ORAL
Qty: 1 PACKAGE | Refills: 0 | Status: SHIPPED | OUTPATIENT
Start: 2021-10-08 | End: 2021-10-29

## 2024-04-09 ENCOUNTER — HOSPITAL ENCOUNTER (INPATIENT)
Facility: HOSPITAL | Age: 74
LOS: 5 days | Discharge: NURSING, REHAB OR LONG-TERM CARE | DRG: 481 | End: 2024-04-14
Attending: STUDENT IN AN ORGANIZED HEALTH CARE EDUCATION/TRAINING PROGRAM | Admitting: STUDENT IN AN ORGANIZED HEALTH CARE EDUCATION/TRAINING PROGRAM
Payer: MEDICARE

## 2024-04-09 DIAGNOSIS — M97.01XA PERIPROSTHETIC FRACTURE AROUND INTERNAL PROSTHETIC RIGHT HIP JOINT, INITIAL ENCOUNTER: ICD-10-CM

## 2024-04-09 DIAGNOSIS — S72.331A CLOSED DISPLACED OBLIQUE FRACTURE OF SHAFT OF RIGHT FEMUR, INITIAL ENCOUNTER: Primary | ICD-10-CM

## 2024-04-09 DIAGNOSIS — R07.9 CHEST PAIN: ICD-10-CM

## 2024-04-09 DIAGNOSIS — S72.009A HIP FRACTURE: ICD-10-CM

## 2024-04-09 DIAGNOSIS — Z86.73 HISTORY OF CVA (CEREBROVASCULAR ACCIDENT): ICD-10-CM

## 2024-04-09 PROBLEM — Z01.818 PREOPERATIVE EXAMINATION: Status: ACTIVE | Noted: 2024-04-09

## 2024-04-09 PROBLEM — Z66 DNR (DO NOT RESUSCITATE): Status: ACTIVE | Noted: 2024-04-09

## 2024-04-09 PROBLEM — E11.9 CONTROLLED TYPE 2 DIABETES MELLITUS: Chronic | Status: ACTIVE | Noted: 2024-04-09

## 2024-04-09 PROBLEM — S72.90XA CLOSED FRACTURE OF FEMUR: Status: ACTIVE | Noted: 2024-04-09

## 2024-04-09 PROCEDURE — 11000001 HC ACUTE MED/SURG PRIVATE ROOM

## 2024-04-09 PROCEDURE — 25000003 PHARM REV CODE 250: Performed by: STUDENT IN AN ORGANIZED HEALTH CARE EDUCATION/TRAINING PROGRAM

## 2024-04-09 PROCEDURE — 93010 ELECTROCARDIOGRAM REPORT: CPT | Mod: ,,, | Performed by: INTERNAL MEDICINE

## 2024-04-09 PROCEDURE — 93005 ELECTROCARDIOGRAM TRACING: CPT

## 2024-04-09 RX ORDER — MUPIROCIN 20 MG/G
OINTMENT TOPICAL 2 TIMES DAILY
Status: DISCONTINUED | OUTPATIENT
Start: 2024-04-09 | End: 2024-04-14 | Stop reason: HOSPADM

## 2024-04-09 RX ORDER — AMLODIPINE BESYLATE 10 MG/1
10 TABLET ORAL DAILY
Status: DISCONTINUED | OUTPATIENT
Start: 2024-04-10 | End: 2024-04-14 | Stop reason: HOSPADM

## 2024-04-09 RX ORDER — ALUMINUM HYDROXIDE, MAGNESIUM HYDROXIDE, AND SIMETHICONE 1200; 120; 1200 MG/30ML; MG/30ML; MG/30ML
30 SUSPENSION ORAL 4 TIMES DAILY PRN
Status: DISCONTINUED | OUTPATIENT
Start: 2024-04-09 | End: 2024-04-14 | Stop reason: HOSPADM

## 2024-04-09 RX ORDER — PREGABALIN 75 MG/1
75 CAPSULE ORAL NIGHTLY
Status: DISCONTINUED | OUTPATIENT
Start: 2024-04-09 | End: 2024-04-11 | Stop reason: HOSPADM

## 2024-04-09 RX ORDER — HYDROCODONE BITARTRATE AND ACETAMINOPHEN 5; 325 MG/1; MG/1
1 TABLET ORAL EVERY 6 HOURS PRN
Status: DISCONTINUED | OUTPATIENT
Start: 2024-04-10 | End: 2024-04-10

## 2024-04-09 RX ORDER — LOSARTAN POTASSIUM 100 MG/1
1 TABLET ORAL DAILY
COMMUNITY
Start: 2024-03-27

## 2024-04-09 RX ORDER — NALOXONE HCL 0.4 MG/ML
0.02 VIAL (ML) INJECTION
Status: DISCONTINUED | OUTPATIENT
Start: 2024-04-09 | End: 2024-04-14 | Stop reason: HOSPADM

## 2024-04-09 RX ORDER — SODIUM CHLORIDE 9 MG/ML
INJECTION, SOLUTION INTRAVENOUS CONTINUOUS
Status: DISCONTINUED | OUTPATIENT
Start: 2024-04-09 | End: 2024-04-10

## 2024-04-09 RX ORDER — GLYCERIN 1 G/1
1 SUPPOSITORY RECTAL ONCE AS NEEDED
Status: DISCONTINUED | OUTPATIENT
Start: 2024-04-09 | End: 2024-04-14 | Stop reason: HOSPADM

## 2024-04-09 RX ORDER — SIMETHICONE 80 MG
1 TABLET,CHEWABLE ORAL 4 TIMES DAILY PRN
Status: DISCONTINUED | OUTPATIENT
Start: 2024-04-09 | End: 2024-04-14 | Stop reason: HOSPADM

## 2024-04-09 RX ORDER — SODIUM CHLORIDE 0.9 % (FLUSH) 0.9 %
1-10 SYRINGE (ML) INJECTION EVERY 12 HOURS PRN
Status: DISCONTINUED | OUTPATIENT
Start: 2024-04-09 | End: 2024-04-14 | Stop reason: HOSPADM

## 2024-04-09 RX ORDER — ONDANSETRON HYDROCHLORIDE 2 MG/ML
4 INJECTION, SOLUTION INTRAVENOUS EVERY 12 HOURS PRN
Status: DISCONTINUED | OUTPATIENT
Start: 2024-04-09 | End: 2024-04-14 | Stop reason: HOSPADM

## 2024-04-09 RX ORDER — BISACODYL 10 MG/1
10 SUPPOSITORY RECTAL DAILY PRN
Status: DISCONTINUED | OUTPATIENT
Start: 2024-04-09 | End: 2024-04-14 | Stop reason: HOSPADM

## 2024-04-09 RX ORDER — ACETAMINOPHEN 500 MG
1000 TABLET ORAL EVERY 8 HOURS
Status: COMPLETED | OUTPATIENT
Start: 2024-04-09 | End: 2024-04-10

## 2024-04-09 RX ORDER — GLUCAGON 1 MG
1 KIT INJECTION
Status: DISCONTINUED | OUTPATIENT
Start: 2024-04-09 | End: 2024-04-14 | Stop reason: HOSPADM

## 2024-04-09 RX ORDER — ACETAMINOPHEN 325 MG/1
650 TABLET ORAL EVERY 4 HOURS PRN
Status: DISCONTINUED | OUTPATIENT
Start: 2024-04-09 | End: 2024-04-09

## 2024-04-09 RX ORDER — ACETAMINOPHEN 325 MG/1
650 TABLET ORAL EVERY 8 HOURS PRN
Status: DISCONTINUED | OUTPATIENT
Start: 2024-04-09 | End: 2024-04-09

## 2024-04-09 RX ORDER — AMLODIPINE BESYLATE 10 MG/1
10 TABLET ORAL
COMMUNITY
Start: 2024-03-27

## 2024-04-09 RX ORDER — IBUPROFEN 200 MG
24 TABLET ORAL
Status: DISCONTINUED | OUTPATIENT
Start: 2024-04-09 | End: 2024-04-14 | Stop reason: HOSPADM

## 2024-04-09 RX ORDER — IBUPROFEN 200 MG
16 TABLET ORAL
Status: DISCONTINUED | OUTPATIENT
Start: 2024-04-09 | End: 2024-04-14 | Stop reason: HOSPADM

## 2024-04-09 RX ORDER — SODIUM CHLORIDE 0.9 % (FLUSH) 0.9 %
10 SYRINGE (ML) INJECTION
Status: DISCONTINUED | OUTPATIENT
Start: 2024-04-09 | End: 2024-04-14 | Stop reason: HOSPADM

## 2024-04-09 RX ORDER — TAMSULOSIN HYDROCHLORIDE 0.4 MG/1
0.4 CAPSULE ORAL DAILY
Status: DISCONTINUED | OUTPATIENT
Start: 2024-04-10 | End: 2024-04-14 | Stop reason: HOSPADM

## 2024-04-09 RX ORDER — ATORVASTATIN CALCIUM 80 MG/1
40 TABLET, FILM COATED ORAL DAILY
COMMUNITY
Start: 2024-03-27

## 2024-04-09 RX ORDER — ATORVASTATIN CALCIUM 40 MG/1
40 TABLET, FILM COATED ORAL DAILY
Status: DISCONTINUED | OUTPATIENT
Start: 2024-04-10 | End: 2024-04-14 | Stop reason: HOSPADM

## 2024-04-09 RX ORDER — TALC
6 POWDER (GRAM) TOPICAL NIGHTLY PRN
Status: DISCONTINUED | OUTPATIENT
Start: 2024-04-09 | End: 2024-04-13

## 2024-04-09 RX ORDER — TIZANIDINE 4 MG/1
2 TABLET ORAL 2 TIMES DAILY PRN
Status: ON HOLD | COMMUNITY
Start: 2023-09-27 | End: 2024-04-14 | Stop reason: HOSPADM

## 2024-04-09 RX ADMIN — ACETAMINOPHEN 1000 MG: 500 TABLET ORAL at 09:04

## 2024-04-09 RX ADMIN — SODIUM CHLORIDE: 9 INJECTION, SOLUTION INTRAVENOUS at 09:04

## 2024-04-09 RX ADMIN — PREGABALIN 75 MG: 75 CAPSULE ORAL at 09:04

## 2024-04-09 RX ADMIN — Medication 6 MG: at 09:04

## 2024-04-09 RX ADMIN — MUPIROCIN: 20 OINTMENT TOPICAL at 09:04

## 2024-04-09 NOTE — PROVIDER TRANSFER
Outside Transfer Acceptance Note / Regional Referral Center    Referring facility: [facility]   Referring provider: SONAL SALAZAR  Accepting facility: [facility]  Accepting provider: Cale Fernandez MD  Admitting provider: Cale Fernandez MD  Reason for transfer: Higher Level of Care   Transfer diagnosis: hip fracture, right  Transfer specialty requested: Orthopedic Surgery  Transfer specialty notified: Yes  Transfer level: NUMBER 1-5: 2  Bed type requested: med/surg  Isolation status: No active isolations   Admission class or status: IP - inpatient      Narrative     Blayne Chavis is a 74yo M with a PMH of HTN, hx of CVA, hx of right hip arthoplasty in 2016, GERD, and YARELI who presented to the [facility] ED on 4/9/2024 with complains of right hip pain. Pt was in his kitchen on morning of admission and stubbed his toe, afterwards falling on his right hip and also hitting his head. EMS was called to bring pt to the ER.    In the ED, vitals showed HR 95, /76, RR 22, and satting 95% on RA. Labs grossly unremarkable. CTH without acute findings. XR series revealed right-sided proximal trochanteric fracture with displacement and communented component. Per ED MD, neuro exam WNL.    [facility] contacted to facilitate transfer for orthopedic services, and Dr. Sanders with [facility] agreed to consult. Pt accepted to  at [facility] for further care and management.    Objective     Vitals:    Recent Labs: All pertinent labs within the past 24 hours have been reviewed.  Recent imaging: Reviewed     Allergies:   Review of patient's allergies indicates:   Allergen Reactions    Sulfa (sulfonamide antibiotics) Other (See Comments)      NPO: No    Anticoagulation:   Anticoagulants       None             Instructions      [facility]-  Admit to Hospital Medicine  Upon patient arrival to floor, please send SecureChat to [facility] or call extension [phone] (if no answer, do NOT leave a callback number after the  beep, rather please send a SecureChat to [facility]), for Hospital Medicine admit team assignment and for additional admit orders for the patient.  Do not page the attending physician associated with the patient on arrival (this physician may not be on duty at the time of arrival).  Rather, always send a SecureChat to [facility] or call [phone] to reach the triage physician for orders and team assignment.      Cale Fernandez MD  Attending Physician  Medical Director - [facility] Observation Unit  Department of Hospital Medicine  4/9/2024

## 2024-04-10 ENCOUNTER — ANESTHESIA EVENT (OUTPATIENT)
Dept: SURGERY | Facility: HOSPITAL | Age: 74
DRG: 481 | End: 2024-04-10
Payer: MEDICARE

## 2024-04-10 PROBLEM — K59.01 SLOW TRANSIT CONSTIPATION: Status: ACTIVE | Noted: 2024-04-10

## 2024-04-10 PROBLEM — Z80.42 FAMILY HISTORY OF PROSTATE CANCER: Status: RESOLVED | Noted: 2021-03-30 | Resolved: 2024-04-10

## 2024-04-10 PROBLEM — E11.9 CONTROLLED TYPE 2 DIABETES MELLITUS WITHOUT COMPLICATION, WITHOUT LONG-TERM CURRENT USE OF INSULIN: Status: ACTIVE | Noted: 2024-04-09

## 2024-04-10 PROBLEM — Z96.649 PERIPROSTHETIC FRACTURE AROUND INTERNAL PROSTHETIC HIP JOINT: Status: ACTIVE | Noted: 2024-04-09

## 2024-04-10 PROBLEM — M97.8XXA PERIPROSTHETIC FRACTURE AROUND INTERNAL PROSTHETIC HIP JOINT: Status: ACTIVE | Noted: 2024-04-09

## 2024-04-10 LAB
ABO + RH BLD: NORMAL
ANION GAP SERPL CALC-SCNC: 10 MMOL/L (ref 8–16)
BASOPHILS # BLD AUTO: 0.01 K/UL (ref 0–0.2)
BASOPHILS NFR BLD: 0.2 % (ref 0–1.9)
BLD GP AB SCN CELLS X3 SERPL QL: NORMAL
BUN SERPL-MCNC: 28 MG/DL (ref 8–23)
CALCIUM SERPL-MCNC: 8.6 MG/DL (ref 8.7–10.5)
CHLORIDE SERPL-SCNC: 110 MMOL/L (ref 95–110)
CO2 SERPL-SCNC: 19 MMOL/L (ref 23–29)
CREAT SERPL-MCNC: 0.9 MG/DL (ref 0.5–1.4)
DIFFERENTIAL METHOD BLD: ABNORMAL
EOSINOPHIL # BLD AUTO: 0 K/UL (ref 0–0.5)
EOSINOPHIL NFR BLD: 0 % (ref 0–8)
ERYTHROCYTE [DISTWIDTH] IN BLOOD BY AUTOMATED COUNT: 12.9 % (ref 11.5–14.5)
EST. GFR  (NO RACE VARIABLE): >60 ML/MIN/1.73 M^2
GLUCOSE SERPL-MCNC: 126 MG/DL (ref 70–110)
HCT VFR BLD AUTO: 38 % (ref 40–54)
HGB BLD-MCNC: 12.4 G/DL (ref 14–18)
IMM GRANULOCYTES # BLD AUTO: 0.01 K/UL (ref 0–0.04)
IMM GRANULOCYTES NFR BLD AUTO: 0.2 % (ref 0–0.5)
LYMPHOCYTES # BLD AUTO: 0.6 K/UL (ref 1–4.8)
LYMPHOCYTES NFR BLD: 9.6 % (ref 18–48)
MAGNESIUM SERPL-MCNC: 2.3 MG/DL (ref 1.6–2.6)
MCH RBC QN AUTO: 29.1 PG (ref 27–31)
MCHC RBC AUTO-ENTMCNC: 32.6 G/DL (ref 32–36)
MCV RBC AUTO: 89 FL (ref 82–98)
MONOCYTES # BLD AUTO: 0.8 K/UL (ref 0.3–1)
MONOCYTES NFR BLD: 13.9 % (ref 4–15)
NEUTROPHILS # BLD AUTO: 4.5 K/UL (ref 1.8–7.7)
NEUTROPHILS NFR BLD: 76.1 % (ref 38–73)
NRBC BLD-RTO: 0 /100 WBC
OHS QRS DURATION: 96 MS
OHS QTC CALCULATION: 460 MS
PHOSPHATE SERPL-MCNC: 4.3 MG/DL (ref 2.7–4.5)
PLATELET # BLD AUTO: 154 K/UL (ref 150–450)
PMV BLD AUTO: 10 FL (ref 9.2–12.9)
POCT GLUCOSE: 141 MG/DL (ref 70–110)
POTASSIUM SERPL-SCNC: 4.1 MMOL/L (ref 3.5–5.1)
RBC # BLD AUTO: 4.26 M/UL (ref 4.6–6.2)
SODIUM SERPL-SCNC: 139 MMOL/L (ref 136–145)
SPECIMEN OUTDATE: NORMAL
WBC # BLD AUTO: 5.91 K/UL (ref 3.9–12.7)

## 2024-04-10 PROCEDURE — 63600175 PHARM REV CODE 636 W HCPCS

## 2024-04-10 PROCEDURE — 86901 BLOOD TYPING SEROLOGIC RH(D): CPT | Performed by: STUDENT IN AN ORGANIZED HEALTH CARE EDUCATION/TRAINING PROGRAM

## 2024-04-10 PROCEDURE — 83735 ASSAY OF MAGNESIUM: CPT | Performed by: STUDENT IN AN ORGANIZED HEALTH CARE EDUCATION/TRAINING PROGRAM

## 2024-04-10 PROCEDURE — 36415 COLL VENOUS BLD VENIPUNCTURE: CPT | Performed by: STUDENT IN AN ORGANIZED HEALTH CARE EDUCATION/TRAINING PROGRAM

## 2024-04-10 PROCEDURE — 84100 ASSAY OF PHOSPHORUS: CPT | Performed by: STUDENT IN AN ORGANIZED HEALTH CARE EDUCATION/TRAINING PROGRAM

## 2024-04-10 PROCEDURE — 85025 COMPLETE CBC W/AUTO DIFF WBC: CPT | Performed by: STUDENT IN AN ORGANIZED HEALTH CARE EDUCATION/TRAINING PROGRAM

## 2024-04-10 PROCEDURE — 21400001 HC TELEMETRY ROOM

## 2024-04-10 PROCEDURE — 80048 BASIC METABOLIC PNL TOTAL CA: CPT | Performed by: STUDENT IN AN ORGANIZED HEALTH CARE EDUCATION/TRAINING PROGRAM

## 2024-04-10 PROCEDURE — 25000003 PHARM REV CODE 250: Performed by: INTERNAL MEDICINE

## 2024-04-10 PROCEDURE — 25000003 PHARM REV CODE 250: Performed by: STUDENT IN AN ORGANIZED HEALTH CARE EDUCATION/TRAINING PROGRAM

## 2024-04-10 PROCEDURE — 63600175 PHARM REV CODE 636 W HCPCS: Performed by: STUDENT IN AN ORGANIZED HEALTH CARE EDUCATION/TRAINING PROGRAM

## 2024-04-10 PROCEDURE — 86920 COMPATIBILITY TEST SPIN: CPT | Performed by: STUDENT IN AN ORGANIZED HEALTH CARE EDUCATION/TRAINING PROGRAM

## 2024-04-10 RX ORDER — ACETAMINOPHEN 500 MG
1000 TABLET ORAL EVERY 8 HOURS
Status: DISCONTINUED | OUTPATIENT
Start: 2024-04-10 | End: 2024-04-11

## 2024-04-10 RX ORDER — POLYETHYLENE GLYCOL 3350 17 G/17G
17 POWDER, FOR SOLUTION ORAL DAILY
Status: DISCONTINUED | OUTPATIENT
Start: 2024-04-10 | End: 2024-04-14 | Stop reason: HOSPADM

## 2024-04-10 RX ORDER — OXYCODONE HYDROCHLORIDE 5 MG/1
5 TABLET ORAL EVERY 4 HOURS PRN
Status: DISCONTINUED | OUTPATIENT
Start: 2024-04-10 | End: 2024-04-11

## 2024-04-10 RX ORDER — AMOXICILLIN 250 MG
1 CAPSULE ORAL 2 TIMES DAILY
Status: DISCONTINUED | OUTPATIENT
Start: 2024-04-10 | End: 2024-04-14 | Stop reason: HOSPADM

## 2024-04-10 RX ORDER — HEPARIN SODIUM 5000 [USP'U]/ML
5000 INJECTION, SOLUTION INTRAVENOUS; SUBCUTANEOUS EVERY 8 HOURS
Status: COMPLETED | OUTPATIENT
Start: 2024-04-10 | End: 2024-04-10

## 2024-04-10 RX ORDER — INSULIN ASPART 100 [IU]/ML
0-5 INJECTION, SOLUTION INTRAVENOUS; SUBCUTANEOUS
Status: DISCONTINUED | OUTPATIENT
Start: 2024-04-10 | End: 2024-04-14 | Stop reason: HOSPADM

## 2024-04-10 RX ORDER — OXYCODONE HYDROCHLORIDE 10 MG/1
10 TABLET ORAL EVERY 4 HOURS PRN
Status: DISCONTINUED | OUTPATIENT
Start: 2024-04-10 | End: 2024-04-11

## 2024-04-10 RX ORDER — HYDROCODONE BITARTRATE AND ACETAMINOPHEN 500; 5 MG/1; MG/1
TABLET ORAL
Status: DISCONTINUED | OUTPATIENT
Start: 2024-04-10 | End: 2024-04-14 | Stop reason: HOSPADM

## 2024-04-10 RX ORDER — METHOCARBAMOL 500 MG/1
500 TABLET, FILM COATED ORAL 3 TIMES DAILY
Status: DISCONTINUED | OUTPATIENT
Start: 2024-04-10 | End: 2024-04-11

## 2024-04-10 RX ADMIN — ACETAMINOPHEN 1000 MG: 500 TABLET ORAL at 02:04

## 2024-04-10 RX ADMIN — ACETAMINOPHEN 1000 MG: 500 TABLET ORAL at 09:04

## 2024-04-10 RX ADMIN — MUPIROCIN: 20 OINTMENT TOPICAL at 09:04

## 2024-04-10 RX ADMIN — HEPARIN SODIUM 5000 UNITS: 5000 INJECTION INTRAVENOUS; SUBCUTANEOUS at 02:04

## 2024-04-10 RX ADMIN — PREGABALIN 75 MG: 75 CAPSULE ORAL at 09:04

## 2024-04-10 RX ADMIN — AMLODIPINE BESYLATE 10 MG: 10 TABLET ORAL at 09:04

## 2024-04-10 RX ADMIN — TAMSULOSIN HYDROCHLORIDE 0.4 MG: 0.4 CAPSULE ORAL at 09:04

## 2024-04-10 RX ADMIN — HEPARIN SODIUM 5000 UNITS: 5000 INJECTION INTRAVENOUS; SUBCUTANEOUS at 09:04

## 2024-04-10 RX ADMIN — ACETAMINOPHEN 1000 MG: 500 TABLET ORAL at 05:04

## 2024-04-10 RX ADMIN — OXYCODONE HYDROCHLORIDE 10 MG: 10 TABLET ORAL at 07:04

## 2024-04-10 RX ADMIN — METHOCARBAMOL 500 MG: 500 TABLET ORAL at 09:04

## 2024-04-10 RX ADMIN — ATORVASTATIN CALCIUM 40 MG: 40 TABLET, FILM COATED ORAL at 09:04

## 2024-04-10 RX ADMIN — DOCUSATE SODIUM AND SENNOSIDES 1 TABLET: 8.6; 5 TABLET, FILM COATED ORAL at 09:04

## 2024-04-10 RX ADMIN — HYDROCODONE BITARTRATE AND ACETAMINOPHEN 1 TABLET: 5; 325 TABLET ORAL at 12:04

## 2024-04-10 RX ADMIN — Medication 6 MG: at 09:04

## 2024-04-10 RX ADMIN — BISACODYL 10 MG: 10 SUPPOSITORY RECTAL at 12:04

## 2024-04-10 NOTE — ASSESSMENT & PLAN NOTE
Patient's FSGs are controlled on current medication regimen. Patient on no meds at home. No recent HgA1C so will check one on this admit.  Last A1c reviewed-   Lab Results   Component Value Date    HGBA1C 5.7 06/30/2010     Current correctional scale  Low  Start anti-hyperglycemic dose as follows-   Antihyperglycemics (From admission, onward)      Start     Stop Route Frequency Ordered    04/10/24 1805  insulin aspart U-100 pen 0-5 Units         -- SubQ Before meals & nightly PRN 04/10/24 1706          Hold Oral hypoglycemics while patient is in the hospital.  Monitor blood sugars with meals and at bedtime in hospital.  Target blood sugars 140-180 in hospital.

## 2024-04-10 NOTE — SUBJECTIVE & OBJECTIVE
Interval History: [facility] with periprosthetic comminuted fracture of the intertrochanteric and greater trochanteric region of the right femur in patient with previous total hip arthroplasty in 2014. Orthopedics evauated and plan ORIF of right proximal femur to repair fracture. Unable to do surgery today due to lack of OR space so hopefully can do surgery tomorrow, 4/11 so surgery rescheduled. I spoke with Dr. Sanders from Orthopedics who updated me on Ortho plans on afternoon of 4/10. Patient aware and diet ordered and to be NPO after midnight tonight. Patient started on multimodal pain medciations with scheduled Tylenol, Robaxin and Lyrica with Oxycodone IR po prn for breakthrough pain. Patient reports 5/10 pain to right hip. Patient started on Heparin 5000 units subcutaneous TID for DVT prophylaxis pre-op. Labs reviewed. Hgb stable at 12.4.     Review of Systems   Constitutional:  Negative for fever.   Respiratory:  Negative for cough and shortness of breath.    Cardiovascular:  Negative for chest pain.   Gastrointestinal:  Positive for constipation. Negative for abdominal pain and nausea.   Musculoskeletal:  Positive for arthralgias (Right hip).   Neurological:  Negative for dizziness.   Psychiatric/Behavioral:  Negative for agitation and confusion.      Objective:     Vital Signs (Most Recent):  Temp: 99.1 °F (37.3 °C) (04/10/24 1536)  Pulse: 105 (04/10/24 1536)  Resp: 18 (04/10/24 1536)  BP: 130/69 (04/10/24 1536)  SpO2: 97 % (04/10/24 1536) on room air Vital Signs (24h Range):  Temp:  [97.4 °F (36.3 °C)-99.1 °F (37.3 °C)] 99.1 °F (37.3 °C)  Pulse:  [] 105  Resp:  [16-18] 18  SpO2:  [87 %-96 %] 87 %  BP: (109-178)/(59-93) 130/69     Weight: 97.5 kg (215 lb)  Body mass index is 29.99 kg/m².    Intake/Output Summary (Last 24 hours) at 4/10/2024 1646  Last data filed at 4/10/2024 1505  Gross per 24 hour   Intake --   Output 900 ml   Net -900 ml          Physical Exam  Vitals and nursing note reviewed.   Constitutional:       General: He is awake. He is not in acute distress.     Appearance: Normal appearance. He is well-developed and normal weight. He is not ill-appearing.   Eyes:      Conjunctiva/sclera: Conjunctivae normal.   Cardiovascular:      Rate and Rhythm: Normal rate and regular rhythm.      Heart sounds: Normal heart sounds. No murmur heard.  Pulmonary:      Effort: Pulmonary effort is normal. No respiratory distress.      Breath sounds: Normal breath sounds. No wheezing.   Abdominal:      General: Abdomen is flat. Bowel sounds are normal. There is no distension.      Palpations: Abdomen is soft.      Tenderness: There is no abdominal tenderness.   Musculoskeletal:      Right lower leg: No edema.      Left lower leg: No edema.      Comments: Right leg elevated.   Skin:     Findings: No erythema.   Neurological:      Mental Status: He is alert and oriented to person, place, and time.   Psychiatric:         Mood and Affect: Mood normal.         Behavior: Behavior normal. Behavior is cooperative.         Thought Content: Thought content normal.         Judgment: Judgment normal.             Significant Labs: CBC:   Recent Labs   Lab 04/10/24  0419   WBC 5.91   HGB 12.4*   HCT 38.0*        CMP:   Recent Labs   Lab 04/10/24  0420      K 4.1      CO2 19*   *   BUN 28*   CREATININE 0.9   CALCIUM 8.6*   ANIONGAP 10     Magnesium:   Recent Labs   Lab 04/10/24  0420   MG 2.3       Significant Imaging: I have reviewed all pertinent imaging results/findings within the past 24 hours.

## 2024-04-10 NOTE — HOSPITAL COURSE
[facility] fall with periprosthetic comminuted fracture of the intertrochanteric and greater trochanteric region of the right femur in patient with previous total hip arthroplasty in 2014. Orthopedics evauated and plan ORIF of right proximal femur to repair fracture. Patient started on multimodal pain medciations with scheduled Tylenol, Robaxin and Lyrica with Oxycodone IR po prn for breakthrough pain. Patient went to OR on 4/11 for operative repair so started on Heparin 5000 units subcutaneous TID for DVT prophylaxis.  Apixaban DVT prophylaxis post-op. Anesthesia management of pain post op with PNC and multi modals. Discharged to Rehab with ortho follow up.    Patient deemed appropriate for discharge. I personally saw, examined, and evaluated patient prior to departure. Plan discussed with patient, who was agreeable and amenable; medications were discussed and reviewed, outpatient follow-up scheduled, ER precautions were given, all questions were answered to the patient's satisfaction, and Blayne Chavis was subsequently discharged.

## 2024-04-10 NOTE — PROGRESS NOTES
[facility] Medicine  Progress Note    Patient Name: Blayne Chavis  MRN: [id number]  Patient Class: IP- Inpatient   Admission Date: 4/9/2024  Length of Stay: 1 days  Attending Physician: Angela Tripathi MD  Primary Care Provider: Jon Baca MD        Subjective:     Principal Problem:Periprosthetic fracture around internal prosthetic hip joint        HPI:  Blayne Chavis is a 73 y.o. male with history of CVA with residual mild right-sided weakness, history of R hip arthroplasty, HTN, HLD [facility].     He reports that when walking today, he stubbed his toe, causing him to fall to the ground.  He immediately noted right hip pain, however he thought this was due to just a strain, and continue to walk.  However, the pain did not resolve.  [facility].  He denies any presyncopal symptoms or loss of consciousness.  At baseline, he has mild residual right-sided weakness from his prior CVA but is able to ambulate without any assistive devices.    Reportedly XR showed R proximal trochanteric fracture with displacement and comminuted component. For this reason, he was transferred here due to need for Orthopedics.     Overview/Hospital Course:  [facility] with periprosthetic comminuted fracture of the intertrochanteric and greater trochanteric region of the right femur in patient with previous total hip arthroplasty in 2014. Orthopedics evauated and plan ORIF of right proximal femur to repair fracture. Patient started on multimodal pain medciations with scheduled Tylenol, Robaxin and Lyrica with Oxycodone IR po prn for breakthrough pain. Patient likely to go to OR on 4/11 for operative repair so started on Heparin 5000 units subcutaneous TID for DVT prophylaxis.     Interval History: [facility] fall with periprosthetic comminuted  fracture of the intertrochanteric and greater trochanteric region of the right femur in patient with previous total hip arthroplasty in 2014. Orthopedics evauated and plan ORIF of right proximal femur to repair fracture. Unable to do surgery today due to lack of OR space so hopefully can do surgery tomorrow, 4/11 so surgery rescheduled. I spoke with Dr. Sanders from Orthopedics who updated me on Ortho plans on afternoon of 4/10. Patient aware and diet ordered and to be NPO after midnight tonight. Patient started on multimodal pain medciations with scheduled Tylenol, Robaxin and Lyrica with Oxycodone IR po prn for breakthrough pain. Patient reports 5/10 pain to right hip. Patient started on Heparin 5000 units subcutaneous TID for DVT prophylaxis pre-op. Labs reviewed. Hgb stable at 12.4.     Review of Systems   Constitutional:  Negative for fever.   Respiratory:  Negative for cough and shortness of breath.    Cardiovascular:  Negative for chest pain.   Gastrointestinal:  Positive for constipation. Negative for abdominal pain and nausea.   Musculoskeletal:  Positive for arthralgias (Right hip).   Neurological:  Negative for dizziness.   Psychiatric/Behavioral:  Negative for agitation and confusion.      Objective:     Vital Signs (Most Recent):  Temp: 99.1 °F (37.3 °C) (04/10/24 1536)  Pulse: 105 (04/10/24 1536)  Resp: 18 (04/10/24 1536)  BP: 130/69 (04/10/24 1536)  SpO2: 97 % (04/10/24 1536) on room air Vital Signs (24h Range):  Temp:  [97.4 °F (36.3 °C)-99.1 °F (37.3 °C)] 99.1 °F (37.3 °C)  Pulse:  [] 105  Resp:  [16-18] 18  SpO2:  [87 %-96 %] 87 %  BP: (109-178)/(59-93) 130/69     Weight: 97.5 kg (215 lb)  Body mass index is 29.99 kg/m².    Intake/Output Summary (Last 24 hours) at 4/10/2024 1646  Last data filed at 4/10/2024 1505  Gross per 24 hour   Intake --   Output 900 ml   Net -900 ml         Physical Exam  Vitals and nursing note reviewed.   Constitutional:       General: He is awake. He is not in acute  distress.     Appearance: Normal appearance. He is well-developed and normal weight. He is not ill-appearing.   Eyes:      Conjunctiva/sclera: Conjunctivae normal.   Cardiovascular:      Rate and Rhythm: Normal rate and regular rhythm.      Heart sounds: Normal heart sounds. No murmur heard.  Pulmonary:      Effort: Pulmonary effort is normal. No respiratory distress.      Breath sounds: Normal breath sounds. No wheezing.   Abdominal:      General: Abdomen is flat. Bowel sounds are normal. There is no distension.      Palpations: Abdomen is soft.      Tenderness: There is no abdominal tenderness.   Musculoskeletal:      Right lower leg: No edema.      Left lower leg: No edema.      Comments: Right leg elevated.   Skin:     Findings: No erythema.   Neurological:      Mental Status: He is alert and oriented to person, place, and time.   Psychiatric:         Mood and Affect: Mood normal.         Behavior: Behavior normal. Behavior is cooperative.         Thought Content: Thought content normal.         Judgment: Judgment normal.             Significant Labs: CBC:   Recent Labs   Lab 04/10/24  0419   WBC 5.91   HGB 12.4*   HCT 38.0*        CMP:   Recent Labs   Lab 04/10/24  0420      K 4.1      CO2 19*   *   BUN 28*   CREATININE 0.9   CALCIUM 8.6*   ANIONGAP 10     Magnesium:   Recent Labs   Lab 04/10/24  0420   MG 2.3       Significant Imaging: I have reviewed all pertinent imaging results/findings within the past 24 hours.    Assessment/Plan:      * Periprosthetic fracture around internal prosthetic left hip joint  [facility] with periprosthetic comminuted fracture of the intertrochanteric and greater trochanteric region of the right femur in patient with previous total hip arthroplasty in 2014.   Orthopedics evaluated and appreciate recs and plan ORIF of right proximal femur to repair fracture. No OR space on 4/10 so Ortho plans to take to OR on 4/11  for repair. NPO after midnight tonight.   Patient started on multimodal pain medications with scheduled Tylenol, Robaxin and Lyrica with Oxycodone OR po prn for breakthrough pain and will continue as pain controlled at present.   Heparin 5000 units subcutaneous TID for DVT prophylaxis pre-op.    Bed rest for now and non weight bearing to right lower extremity as per Ortho pre-op.  Check Vitamin D level.     Controlled type 2 diabetes mellitus without complication, without long-term current use of insulin  Patient's FSGs are controlled on current medication regimen. Patient on no meds at home. No recent HgA1C so will check one on this admit.  Last A1c reviewed-   Lab Results   Component Value Date    HGBA1C 5.7 06/30/2010     Current correctional scale  Low  Start anti-hyperglycemic dose as follows-   Antihyperglycemics (From admission, onward)      Start     Stop Route Frequency Ordered    04/10/24 1805  insulin aspart U-100 pen 0-5 Units         -- SubQ Before meals & nightly PRN 04/10/24 1706          Hold Oral hypoglycemics while patient is in the hospital.  Monitor blood sugars with meals and at bedtime in hospital.  Target blood sugars 140-180 in hospital.     Primary hypertension  Chronic, controlled. Latest blood pressure and vitals reviewed-     Temp:  [97.4 °F (36.3 °C)-99.1 °F (37.3 °C)]   Pulse:  []   Resp:  [16-18]   BP: (109-178)/(59-93)   SpO2:  [87 %-96 %] .   Home meds for hypertension were reviewed and noted below.   Hypertension Medications               amLODIPine (NORVASC) 10 MG tablet Take 1 tablet (10 mg total) by mouth once daily.    losartan (COZAAR) 25 MG tablet Take 1 tablet (25 mg total) by mouth once daily.            While in the hospital, will manage blood pressure as follows; Adjust home antihypertensive regimen as follows- Continue home Norvasc to treat HTN but hold Losartan for surgery and resume post-op as BP tolerates.    Will utilize p.r.n. blood pressure medication only if  patient's blood pressure greater than 180/110 and he develops symptoms such as worsening chest pain or shortness of breath.    Pure hypercholesterolemia  Chronic and controlled. Continue home Lipitor po daily to treat.        History of stroke  Chronic and controlled. Continue home Lipitor to treat. Hold Aspirin for surgery and resume post-op.       DNR (do not resuscitate)  Advance Care Planning  I discussed code status with patient on admission. he states that he would never want CPR or intubation/mechanical ventilation in the event of cardiopulmonary arrest, in agreement with DNR status. His wife is his surrogate decision maker, and he reports that he has discussed this with her.        Slow transit constipation  Patient noted on CT scan of pelvis on admit to have moderate fecal impaction. Patient started on scheduled Senakot 1 tablet po BID + Miralax 17 grams po daily to treat with Doculax suppository prn.       Preoperative examination  Patient undergoing non-emergent non-cardiac surgery.  Patient does not have active cardiac problems   -baseline EKG ordered; to be followed up.   -reports chronic murmur which is demonstrated on exam without signs of decompensated CHF.   -no history of known CAD, LHC, or stents.   Patient undergoing intermediate risk surgery.  Functional Status: Patient has functional METs > or = 4  Revised cardiac risk index (RCRI) score is 1.         Other Issues:       Patient on long term anticoagulation: No      Recent coronary stenting: No    Patient with acceptable cardiac risk with (RCRI score of 1) going for intermediate risk surgery. No absolute contraindications to the proposed surgery at this time. intermediate risk of post-op pulmonary complications.  intermediate risk of post-op delirium.   - Okay to proceed with planned surgery with no additional cardiac testing needed prior to surgery.      VTE Risk Mitigation (From admission, onward)           Ordered     heparin (porcine)  injection 5,000 Units  Every 8 hours         04/10/24 0702     Reason for no Mechanical VTE Prophylaxis  Once        Question:  Reasons:  Answer:  Risk of Bleeding    04/09/24 2115     IP VTE HIGH RISK PATIENT  Once         04/09/24 2115     Reason for No Pharmacological VTE Prophylaxis  Once        Question:  Reasons:  Answer:  Risk of Bleeding    04/09/24 2115     Place sequential compression device  Until discontinued         04/09/24 2113                    Discharge Planning   BHUPENDRA: 4/13/2024     Code Status: DNR   Is the patient medically ready for discharge?: No    Reason for patient still in hospital (select all that apply): Patient trending condition           Angela Tripathi MD  Department of Hospital Medicine   [facility] - Surgery

## 2024-04-10 NOTE — CONSULTS
[facility]  Orthopedics  Consult Note    Patient Name: Blayne Chavis  MRN: [id number]  Admission Date: 4/9/2024  Hospital Length of Stay: 1 days  Attending Provider: Angela Tripathi MD  Primary Care Provider: Jon Baca MD    Patient information was obtained from patient and ER records.     Inpatient consult to Orthopedics  Consult performed by: ELENA Mariano MD  Consult ordered by: Reuben Castrejon MD        Subjective:     Principal Problem:Periprosthetic fracture around internal prosthetic hip joint    Chief Complaint: No chief complaint on file.       HPI: Blayne Chavis is a 73 y.o. male w/PMH of R-EVERETT (2014, Dr. Sanders), T2DM, YARELI, GERD, prior CVA with residual right-sided weakness, presenting as transfer with a right periprosthetic proximal femur fracture.  Patient reportedly tripped and fell onto his right side while at home the morning of presentation (4/9); experienced immediate pain at his right thigh and inability to bear weight.  Denies hitting his head or loss of consciousness.  Also denies any numbness/paresthesias at the RLE or any other musculoskeletal pains.  Patient lives at home with his wife and ambulates without assistance at baseline.  Does not smoke and is not on any blood thinners.      Past Medical History:   Diagnosis Date    Controlled type 2 diabetes mellitus 4/9/2024    GERD (gastroesophageal reflux disease)     Hypertension     Sleep apnea     cannot tolerate CPAP    Stroke     Hemorrhagic stroke -2000       Past Surgical History:   Procedure Laterality Date    APPENDECTOMY      BACK SURGERY      laminectomy    HIP SURGERY  6-9-14    right    TOE SURGERY Right 2/2016    hammer toe surg       Review of patient's allergies indicates:   Allergen Reactions    Sulfa (sulfonamide antibiotics) Other (See Comments)       Current Facility-Administered Medications   Medication    acetaminophen tablet 1,000 mg    aluminum-magnesium hydroxide-simethicone  200-200-20 mg/5 mL suspension 30 mL    amLODIPine tablet 10 mg    atorvastatin tablet 40 mg    bisacodyL suppository 10 mg    glucagon (human recombinant) injection 1 mg    glucose chewable tablet 16 g    glucose chewable tablet 24 g    glycerin adult suppository 1 suppository    heparin (porcine) injection 5,000 Units    HYDROcodone-acetaminophen 5-325 mg per tablet 1 tablet    melatonin tablet 6 mg    mupirocin 2 % ointment    naloxone 0.4 mg/mL injection 0.02 mg    ondansetron injection 4 mg    pregabalin capsule 75 mg    simethicone chewable tablet 80 mg    sodium chloride 0.9% flush 1-10 mL    sodium chloride 0.9% flush 10 mL    tamsulosin 24 hr capsule 0.4 mg     Family History       Problem Relation (Age of Onset)    Cancer Father    Stroke Maternal Grandmother, Paternal Grandfather          Tobacco Use    Smoking status: Never    Smokeless tobacco: Never   Substance and Sexual Activity    Alcohol use: Yes     Alcohol/week: 2.0 standard drinks of alcohol     Types: 2 Glasses of wine per week    Drug use: No    Sexual activity: Not on file     ROS  Constitutional: Denies fever/chills  Eyes: Denies change in vision  ENT: Denies sore throat or rhinorrhea   Respiratory: Denies shortness of breath or cough  Cardiovascular: Denies chest pain or palpitations  Gastrointestinal: Denies abdominal pain, nausea, or vomiting  Genitourinary: Denies dysuria and flank pain  Skin: Denies new rash or skin lesions   Allergic/Immunologic: Denies adverse reactions to current medications  Neurological: Denies headaches or dizziness  Musculoskeletal: see HPI    Objective:     Vital Signs (Most Recent):  Temp: 98.5 °F (36.9 °C) (04/10/24 0754)  Pulse: 82 (04/10/24 0754)  Resp: 16 (04/10/24 0754)  BP: (!) 109/59 (04/10/24 0754)  SpO2: (!) 94 % (04/10/24 0754) Vital Signs (24h Range):  Temp:  [97.8 °F (36.6 °C)-98.7 °F (37.1 °C)] 98.5 °F (36.9 °C)  Pulse:  [] 82  Resp:  [16-18] 16  SpO2:  [93 %-96 %] 94 %  BP: (109-178)/(59-93)  109/59     Weight: 97.5 kg (215 lb)     Body mass index is 29.99 kg/m².    No intake or output data in the 24 hours ending 04/10/24 0940     Ortho/SPM Exam  Physical Exam:  General:  no apparent distress, WDWN  HENT:  NCAT, Bilateral ears/eyes normal  CV:  Normal pulses, color, and cap refill  Lungs:  Normal respiratory effort  Neuro: No FND, awake, alert  Psych:  Oriented to Person, Place, Time, and Situation    MSK:       BUE:  Inspection: Skin intact throughout, no swelling, no effusions, no ecchymosis   Palpation: Non-TTP throughout, no palpable abnormality.   ROM: AROM and PROM of the shoulder, elbow, wrist, and hand intact without pain.   Neuro: AIN/PIN/Radial/Median/Ulnar Nerves assessed in isolation without deficit.   SILT throughout.    Vascular: Radial artery palpated 2+. Capillary refill <3s.         LLE:  Inspection: Skin intact throughout, no swelling, no effusions, no ecchymosis   Palpation: Non-TTP throughout. No palpable abnormality.   ROM: AROM and PROM of the hip, knee, ankle, and foot intact without pain.   Neuro: TA/EHL/Gastroc/FHL assessed in isolation without deficit.   SILT throughout.    Vascular: Foot is WWP. Capillary refill <3s.         RLE:  Inspection: Skin intact throughout, no open wounds  Swelling present at proximal thigh   Palpation: TTP at proximal thigh; otherwise non-TTP throughout.  Compartments are soft and easily compressible    ROM: AROM and PROM of the knee, ankle, foot intact without pain.  Assessment of hip ROM deferred 2/2 known fracture    Neuro: TA/Gastroc/EHL/FHL assessed in isolation without deficit.   SILT Sa/Block/DP/SP/T nerve distributions   Vascular: DP and PT arteries palpated 2+. Capillary refill <3s.        Spine/pelvis/axial body:  No tenderness to palpation of cervical, thoracic, or lumbar spine  Stable and without pain with direct anterior pressure over ASIS.  No chest wall or abdominal tenderness  No decubitus ulcers  Muscle tone normal      Significant Labs:  All pertinent labs within the past 24 hours have been reviewed.    Significant Imaging: I have reviewed and interpreted all pertinent imaging results/findings.  Xrays and CT of the right hip showing a periprosthetic femur fracture with stable-appearing femoral implant in comparison to prior imaging   Assessment/Plan:     * Periprosthetic fracture around internal prosthetic left hip joint  Blayne Chavis is a 73 y.o. male w/PMH of R-EVERETT (2014, Dr. Sanders), T2DM, YARELI, GERD, prior CVA with residual right-sided weakness, presenting as transfer with a right [place] B1 periprosthetic proximal femur fracture. Closed, neurovascularly intact, and without any other musculoskeletal injuries on physical exam.      --Admit to medicine service for pre-operative optimization and medical evaluation.  --Pain control per primary  --DVT PPx: hold chemical, FCDs at all times when not ambulating  --Bed rest, dougherty, NPO      Pt marked and consented, case booked. Pt understands need for definitive treatment of injuries.  Pre-operative labs and imaging obtained in ED   Lab Results   Component Value Date    HGB 12.4 (L) 04/10/2024     04/10/2024    CREATININE 0.9 04/10/2024     (H) 04/10/2024        Risks and complications were discussed including but not limited to the risks of anesthetic complications, infection, wound healing complications, non-union, mal-union, hardware failure, leg length discrepancy, dislocation, instability, pain, stiffness, DVT, pulmonary embolism, perioperative medical risks (cardiac, pulmonary, renal, neurologic), and death among others were discussed. No guarantees were made and the patient and family elect to proceed. All questions were answered.  No guarantees were implied or stated.  Informed consent was obtained.    Disclaimer: This note has been generated using voice-recognition software. There may be typographical errors that have been missed during proof-reading.           ELENA Veloz  MD [facility]  Orthopedics  [facility]

## 2024-04-10 NOTE — ASSESSMENT & PLAN NOTE
[facility] with periprosthetic comminuted fracture of the intertrochanteric and greater trochanteric region of the right femur in patient with previous total hip arthroplasty in 2014.   Orthopedics evaluated and appreciate recs and plan ORIF of right proximal femur to repair fracture. No OR space on 4/10 so Ortho plans to take to OR on 4/11 for repair. NPO after midnight tonight.   Patient started on multimodal pain medications with scheduled Tylenol, Robaxin and Lyrica with Oxycodone OR po prn for breakthrough pain and will continue as pain controlled at present.   Heparin 5000 units subcutaneous TID for DVT prophylaxis pre-op.    Bed rest for now and non weight bearing to right lower extremity as per Ortho pre-op.  Check Vitamin D level.

## 2024-04-10 NOTE — ASSESSMENT & PLAN NOTE
Patient noted on CT scan of pelvis on admit to have moderate fecal impaction. Patient started on scheduled Senakot 1 tablet po BID + Miralax 17 grams po daily to treat with Doculax suppository prn.

## 2024-04-10 NOTE — ASSESSMENT & PLAN NOTE
Patient unclear on home antihypertensive regimen.  Moderately hypertensive, likely somewhat secondary to pain.  We will continue amlodipine for now, and continue losartan if he is still on this.

## 2024-04-10 NOTE — ANESTHESIA PREPROCEDURE EVALUATION
[facility]  Anesthesia Pre-Operative Evaluation         Patient Name: Blayne Chavis  YOB: 1950  MRN: [id number]    SUBJECTIVE:     Pre-operative evaluation for Procedure(s) (LRB):  REVISION, TOTAL ARTHROPLASTY, HIP vs ORIF FEMUR - YUDELKA (Right)     04/10/2024    Blayne Chavis is a 73 y.o. male w/ a significant PMHx of R-EVERETT (2014, Dr. Sanders), T2DM, YARELI, GERD, prior hemorrhagic CVA with residual right-sided weakness, presenting as transfer with a right periprosthetic proximal femur fracture after a fall at home.    Patient now presents for the above procedure(s).    NO PRIOR ECHO    LDA:        Peripheral IV - Single Lumen 20 G Posterior;Right Hand (Active)   Site Assessment Clean;Dry;Intact;No redness;No swelling 04/10/24 1200   Line Status Saline locked 04/10/24 1200   Dressing Status Clean;Dry;Intact 04/10/24 1200   Dressing Intervention Integrity maintained 04/10/24 1200   Number of days:             Urethral Catheter 04/09/24 2350 Latex 16 Fr. (Active)   Number of days: 0       Prev airway: None documented    Drips: None documented.      Patient Active Problem List   Diagnosis    Primary hypertension    Kidney stone    Closed right hip fracture    Pure hypercholesterolemia    Remote history of stroke    History of stroke    Debility    Leg edema    Femoral neck fracture    Hip joint replacement status    Family history of prostate cancer    Periprosthetic fracture around internal prosthetic left hip joint    Preoperative examination    Controlled type 2 diabetes mellitus without complication, without long-term current use of insulin    DNR (do not resuscitate)       Review of patient's allergies indicates:   Allergen Reactions    Sulfa (sulfonamide antibiotics) Other (See Comments)       Current Inpatient Medications:   acetaminophen  1,000 mg Oral Q8H    amLODIPine  10 mg Oral Daily    atorvastatin  40 mg Oral Daily    heparin (porcine)  5,000 Units Subcutaneous Q8H     mupirocin   Nasal BID    pregabalin  75 mg Oral QHS    tamsulosin  0.4 mg Oral Daily       No current facility-administered medications on file prior to encounter.     Current Outpatient Medications on File Prior to Encounter   Medication Sig Dispense Refill    amLODIPine (NORVASC) 10 MG tablet 10 mg.      atorvastatin (LIPITOR) 80 MG tablet Take 40 mg by mouth once daily.      losartan (COZAAR) 100 MG tablet Take 1 tablet by mouth once daily.      tiZANidine (ZANAFLEX) 4 MG tablet Take 2 mg by mouth 2 (two) times daily as needed.      aspirin (ECOTRIN) 325 MG EC tablet Take 325 mg by mouth once daily.      atorvastatin (LIPITOR) 40 MG tablet Take 40 mg by mouth once daily.      hydrochlorothiazide (MICROZIDE) 12.5 mg capsule Take 12.5 mg by mouth every 48 hours.        HYDROcodone-acetaminophen (NORCO) 5-325 mg per tablet Take 1 tablet by mouth every 6 (six) hours as needed for Pain. 15 tablet 0    HYDROcodone-acetaminophen (NORCO) 5-325 mg per tablet Take 1 tablet by mouth every 4 (four) hours as needed for Pain. 8 tablet 0    hydrocodone-acetaminophen 10-325mg (NORCO)  mg Tab   0    ibuprofen (ADVIL,MOTRIN) 400 MG tablet Take 1 tablet (400 mg total) by mouth every 6 (six) hours as needed (pain). 20 tablet 0    losartan (COZAAR) 25 MG tablet Take 1 tablet (25 mg total) by mouth once daily. 30 tablet 1    naproxen (NAPROSYN) 500 MG tablet   2    omeprazole (PRILOSEC) 20 MG capsule Take 20 mg by mouth once daily.        ondansetron (ZOFRAN-ODT) 4 MG TbDL Take 1 tablet (4 mg total) by mouth every 8 (eight) hours as needed (nausea). 10 tablet 0    oxycodone (ROXICODONE) 5 MG immediate release tablet Take 1 - 2 tablets (5-10mg) by mouth every 4-6 hours as needed for moderate to severe pain 32 tablet 0    senna-docusate 8.6-50 mg (PERICOLACE) 8.6-50 mg per tablet Take 1 tablet by mouth daily as needed for Constipation.      tamsulosin (FLOMAX) 0.4 mg Cap Take 1 capsule (0.4 mg total) by mouth once daily. 30  capsule 12       Past Surgical History:   Procedure Laterality Date    APPENDECTOMY      BACK SURGERY      laminectomy    HIP SURGERY  6-9-14    right    TOE SURGERY Right 2/2016    hammer toe surg       Social History     Socioeconomic History    Marital status:    Tobacco Use    Smoking status: Never    Smokeless tobacco: Never   Substance and Sexual Activity    Alcohol use: Yes     Alcohol/week: 2.0 standard drinks of alcohol     Types: 2 Glasses of wine per week    Drug use: No       OBJECTIVE:     Vital Signs Range (Last 24H):  Temp:  [36.3 °C (97.4 °F)-37.1 °C (98.7 °F)]   Pulse:  []   Resp:  [16-18]   BP: (109-178)/(59-93)   SpO2:  [92 %-96 %]       Significant Labs:  Lab Results   Component Value Date    WBC 5.91 04/10/2024    HGB 12.4 (L) 04/10/2024    HCT 38.0 (L) 04/10/2024     04/10/2024    CHOL 147 01/12/2010    TRIG 113 01/12/2010    HDL 41 01/12/2010    ALT 38 05/11/2021    AST 21 05/11/2021     04/10/2024    K 4.1 04/10/2024     04/10/2024    CREATININE 0.9 04/10/2024    BUN 28 (H) 04/10/2024    CO2 19 (L) 04/10/2024    TSH 2.16 01/12/2010    PSA 0.53 06/27/2013    INR 1.0 06/12/2014    HGBA1C 5.7 06/30/2010       Diagnostic Studies: No relevant studies.    EKG:   Results for orders placed or performed during the hospital encounter of 04/09/24   EKG 12-lead    Collection Time: 04/09/24 10:48 PM   Result Value Ref Range    QRS Duration 96 ms    [facility] QTC Calculation 460 ms    Narrative    Test Reason : Z86.73,    Vent. Rate : 114 BPM     Atrial Rate : 114 BPM     P-R Int : 140 ms          QRS Dur : 096 ms      QT Int : 334 ms       P-R-T Axes : 052 -36 015 degrees     QTc Int : 460 ms    Sinus tachycardia  Left axis deviation  Nonspecific ST and T wave abnormality  Abnormal ECG  When compared with ECG of 08-JUN-2014 13:29,  Vent. rate has increased BY  50 BPM  The axis Shifted left  ST now depressed in Anterior-lateral leads  Nonspecific T wave abnormality now evident in  Anterior leads  Confirmed by MICHELINE HARRINGTON MD ([id number]) on 4/10/2024 10:39:37 AM    Referred By: SONAL SALAZAR           Confirmed By:MICHELINE HARRINGTON MD       2D ECHO:  TTE:  No results found for this or any previous visit.    FREDA:  No results found for this or any previous visit.    ASSESSMENT/PLAN:         Pre-op Assessment    I have reviewed the Patient Summary Reports.     I have reviewed the Nursing Notes. I have reviewed the NPO Status.   I have reviewed the Medications.     Review of Systems  Anesthesia Hx:  No problems with previous Anesthesia   History of prior surgery of interest to airway management or planning:          Denies Family Hx of Anesthesia complications.    Denies Personal Hx of Anesthesia complications.                    Hematology/Oncology:  Hematology Normal   Oncology Normal                                   EENT/Dental:  EENT/Dental Normal           Cardiovascular:     Hypertension                                  Hypertension         Pulmonary:        Sleep Apnea     Obstructive Sleep Apnea (YARELI).           Renal/:  Chronic Renal Disease        Kidney Function/Disease             Hepatic/GI:     GERD      Gerd          Musculoskeletal:  Arthritis               Neurological:   CVA, residual symptoms                       CVA - Cerebrovasular Accident                 Endocrine:  Diabetes    Diabetes                      Psych:  Psychiatric Normal                    Physical Exam  General: Cooperative, Alert, Oriented and Well nourished    Airway:  Mallampati: II / I  Mouth Opening: Normal  TM Distance: Normal  Tongue: Normal  Neck ROM: Normal ROM    Dental:  Intact        Anesthesia Plan  Type of Anesthesia, risks & benefits discussed:    Anesthesia Type: Gen ETT, Regional  Intra-op Monitoring Plan: Standard ASA Monitors  Post Op Pain Control Plan: multimodal analgesia, IV/PO Opioids PRN and peripheral nerve block  Induction:  IV  Airway Plan: Direct, Post-Induction  Informed Consent:  Informed consent signed with the Patient and all parties understand the risks and agree with anesthesia plan.  All questions answered.   ASA Score: 3  Day of Surgery Review of History & Physical: H&P Update referred to the surgeon/provider.    Ready For Surgery From Anesthesia Perspective.     .

## 2024-04-10 NOTE — ASSESSMENT & PLAN NOTE
History of diabetes per chart review, maintained without insulin.  Monitor on subsequent labs.  As to avoid hypoglycemia given NPO status, we will not start SSI for now.

## 2024-04-10 NOTE — ASSESSMENT & PLAN NOTE
Blayne Chavis is a 73 y.o. male w/PMH of R-EVERETT (2014, Dr. Sanders), T2DM, YARELI, GERD, prior CVA with residual right-sided weakness, presenting as transfer with a right [place] B1 periprosthetic proximal femur fracture. Closed, neurovascularly intact, and without any other musculoskeletal injuries on physical exam.      --Admit to medicine service for pre-operative optimization and medical evaluation.  --Pain control per primary  --DVT PPx: hold chemical, FCDs at all times when not ambulating  --Bed rest, dougherty, NPO      Pt marked and consented, case booked. Pt understands need for definitive treatment of injuries.  Pre-operative labs and imaging obtained in ED   Lab Results   Component Value Date    HGB 12.4 (L) 04/10/2024     04/10/2024    CREATININE 0.9 04/10/2024     (H) 04/10/2024        Risks and complications were discussed including but not limited to the risks of anesthetic complications, infection, wound healing complications, non-union, mal-union, hardware failure, leg length discrepancy, dislocation, instability, pain, stiffness, DVT, pulmonary embolism, perioperative medical risks (cardiac, pulmonary, renal, neurologic), and death among others were discussed. No guarantees were made and the patient and family elect to proceed. All questions were answered.  No guarantees were implied or stated.  Informed consent was obtained.    Disclaimer: This note has been generated using voice-recognition software. There may be typographical errors that have been missed during proof-reading.

## 2024-04-10 NOTE — HPI
Blayne Chavis is a 73 y.o. male w/PMH of R-EVERETT (2014, Dr. Sanders), T2DM, YARELI, GERD, prior CVA with residual right-sided weakness, presenting as transfer with a right periprosthetic proximal femur fracture.  Patient reportedly tripped and fell onto his right side while at home the morning of presentation (4/9); experienced immediate pain at his right thigh and inability to bear weight.  Denies hitting his head or loss of consciousness.  Also denies any numbness/paresthesias at the RLE or any other musculoskeletal pains.  Patient lives at home with his wife and ambulates without assistance at baseline.  Does not smoke and is not on any blood thinners.

## 2024-04-10 NOTE — SUBJECTIVE & OBJECTIVE
Past Medical History:   Diagnosis Date    GERD (gastroesophageal reflux disease)     Hypertension     Sleep apnea     cannot tolerate CPAP    Stroke     Hemorrhagic stroke -2000       Past Surgical History:   Procedure Laterality Date    APPENDECTOMY      BACK SURGERY      laminectomy    HIP SURGERY  6-9-14    right    TOE SURGERY Right 2/2016    hammer toe surg       Review of patient's allergies indicates:   Allergen Reactions    Sulfa (sulfonamide antibiotics) Other (See Comments)       No current facility-administered medications on file prior to encounter.     Current Outpatient Medications on File Prior to Encounter   Medication Sig    aspirin (ECOTRIN) 325 MG EC tablet Take 325 mg by mouth once daily.    atorvastatin (LIPITOR) 40 MG tablet Take 40 mg by mouth once daily.    hydrochlorothiazide (MICROZIDE) 12.5 mg capsule Take 12.5 mg by mouth every 48 hours.      HYDROcodone-acetaminophen (NORCO) 5-325 mg per tablet Take 1 tablet by mouth every 6 (six) hours as needed for Pain.    HYDROcodone-acetaminophen (NORCO) 5-325 mg per tablet Take 1 tablet by mouth every 4 (four) hours as needed for Pain.    hydrocodone-acetaminophen 10-325mg (NORCO)  mg Tab     ibuprofen (ADVIL,MOTRIN) 400 MG tablet Take 1 tablet (400 mg total) by mouth every 6 (six) hours as needed (pain).    losartan (COZAAR) 25 MG tablet Take 1 tablet (25 mg total) by mouth once daily.    naproxen (NAPROSYN) 500 MG tablet     omeprazole (PRILOSEC) 20 MG capsule Take 20 mg by mouth once daily.      ondansetron (ZOFRAN-ODT) 4 MG TbDL Take 1 tablet (4 mg total) by mouth every 8 (eight) hours as needed (nausea).    oxycodone (ROXICODONE) 5 MG immediate release tablet Take 1 - 2 tablets (5-10mg) by mouth every 4-6 hours as needed for moderate to severe pain    senna-docusate 8.6-50 mg (PERICOLACE) 8.6-50 mg per tablet Take 1 tablet by mouth daily as needed for Constipation.    tamsulosin (FLOMAX) 0.4 mg Cap Take 1 capsule (0.4 mg total) by mouth  "once daily.     Family History       Problem Relation (Age of Onset)    Cancer Father    Stroke Maternal Grandmother, Paternal Grandfather          Tobacco Use    Smoking status: Never    Smokeless tobacco: Never   Substance and Sexual Activity    Alcohol use: Yes     Alcohol/week: 2.0 standard drinks of alcohol     Types: 2 Glasses of wine per week    Drug use: No    Sexual activity: Not on file     Review of Systems   Constitutional:         All pertinent other ROS noted in HPI   All other systems reviewed and are negative.    Objective:     Vital Signs (Most Recent):    Vital Signs (24h Range):  Temp:  [98.4 °F (36.9 °C)] 98.4 °F (36.9 °C)  Pulse:  [107] 107  Resp:  [18] 18  SpO2:  [95 %] 95 %  BP: (178)/(93) 178/93        There is no height or weight on file to calculate BMI.     Physical Exam  Vitals and nursing note reviewed.   Constitutional:       General: He is not in acute distress.     Appearance: He is well-developed. He is not ill-appearing or diaphoretic.   HENT:      Head: Normocephalic and atraumatic.   Eyes:      General: No scleral icterus.     Conjunctiva/sclera: Conjunctivae normal.   Neck:      Vascular: No JVD.   Cardiovascular:      Rate and Rhythm: Normal rate and regular rhythm.      Heart sounds: Murmur (Harsh systolic murmur) heard.   Pulmonary:      Effort: Pulmonary effort is normal. No respiratory distress.   Musculoskeletal:      Left lower leg: No edema.      Comments: Full MSK exam deferred due to right hip fracture and pain   Skin:     Coloration: Skin is not jaundiced or pale.   Neurological:      Mental Status: He is alert and oriented to person, place, and time.      Motor: No abnormal muscle tone.   Psychiatric:         Mood and Affect: Mood normal.         Behavior: Behavior normal.                Significant Labs: All pertinent labs within the past 24 hours have been reviewed.  CBC: No results for input(s): "WBC", "HGB", "HCT", "PLT" in the last 48 hours.  CMP: No results for " "input(s): "NA", "K", "CL", "CO2", "GLU", "BUN", "CREATININE", "CALCIUM", "PROT", "ALBUMIN", "BILITOT", "ALKPHOS", "AST", "ALT", "ANIONGAP", "EGFRNONAA" in the last 48 hours.    Invalid input(s): "ESTGFAFRICA"    Reviewed outside hospital labs    Reviewed outside hospital x-ray    Ordered EKG    Significant Imaging: I have reviewed all pertinent imaging results/findings within the past 24 hours.  "

## 2024-04-10 NOTE — NURSING
Nurses Note -- 4 Eyes      4/10/2024   12:49 AM      Skin assessed during: Q Shift Change      [x] No Altered Skin Integrity Present    []Prevention Measures Documented      [] Yes- Altered Skin Integrity Present or Discovered   [] LDA Added if Not in Epic (Describe Wound)   [] New Altered Skin Integrity was Present on Admit and Documented in LDA   [] Wound Image Taken    Wound Care Consulted? No    Attending Nurse:  Tess Devi RN/Staff Member:   Martha Salgado

## 2024-04-10 NOTE — HPI
Blayne Chavis is a 73 y.o. male with history of CVA with residual mild right-sided weakness, history of R hip arthroplasty, HTN, HLD [facility].     He reports that when walking today, he stubbed his toe, causing him to fall to the ground.  He immediately noted right hip pain, however he thought this was due to just a strain, and continue to walk.  However, the pain did not resolve.  [facility].  He denies any presyncopal symptoms or loss of consciousness.  At baseline, he has mild residual right-sided weakness from his prior CVA but is able to ambulate without any assistive devices.    Reportedly XR showed R proximal trochanteric fracture with displacement and comminuted component. For this reason, he was transferred here due to need for Orthopedics.

## 2024-04-10 NOTE — SUBJECTIVE & OBJECTIVE
Past Medical History:   Diagnosis Date    Controlled type 2 diabetes mellitus 4/9/2024    GERD (gastroesophageal reflux disease)     Hypertension     Sleep apnea     cannot tolerate CPAP    Stroke     Hemorrhagic stroke -2000       Past Surgical History:   Procedure Laterality Date    APPENDECTOMY      BACK SURGERY      laminectomy    HIP SURGERY  6-9-14    right    TOE SURGERY Right 2/2016    hammer toe surg       Review of patient's allergies indicates:   Allergen Reactions    Sulfa (sulfonamide antibiotics) Other (See Comments)       Current Facility-Administered Medications   Medication    acetaminophen tablet 1,000 mg    aluminum-magnesium hydroxide-simethicone 200-200-20 mg/5 mL suspension 30 mL    amLODIPine tablet 10 mg    atorvastatin tablet 40 mg    bisacodyL suppository 10 mg    glucagon (human recombinant) injection 1 mg    glucose chewable tablet 16 g    glucose chewable tablet 24 g    glycerin adult suppository 1 suppository    heparin (porcine) injection 5,000 Units    HYDROcodone-acetaminophen 5-325 mg per tablet 1 tablet    melatonin tablet 6 mg    mupirocin 2 % ointment    naloxone 0.4 mg/mL injection 0.02 mg    ondansetron injection 4 mg    pregabalin capsule 75 mg    simethicone chewable tablet 80 mg    sodium chloride 0.9% flush 1-10 mL    sodium chloride 0.9% flush 10 mL    tamsulosin 24 hr capsule 0.4 mg     Family History       Problem Relation (Age of Onset)    Cancer Father    Stroke Maternal Grandmother, Paternal Grandfather          Tobacco Use    Smoking status: Never    Smokeless tobacco: Never   Substance and Sexual Activity    Alcohol use: Yes     Alcohol/week: 2.0 standard drinks of alcohol     Types: 2 Glasses of wine per week    Drug use: No    Sexual activity: Not on file     ROS  Constitutional: Denies fever/chills  Eyes: Denies change in vision  ENT: Denies sore throat or rhinorrhea   Respiratory: Denies shortness of breath or cough  Cardiovascular: Denies chest pain or  palpitations  Gastrointestinal: Denies abdominal pain, nausea, or vomiting  Genitourinary: Denies dysuria and flank pain  Skin: Denies new rash or skin lesions   Allergic/Immunologic: Denies adverse reactions to current medications  Neurological: Denies headaches or dizziness  Musculoskeletal: see HPI    Objective:     Vital Signs (Most Recent):  Temp: 98.5 °F (36.9 °C) (04/10/24 0754)  Pulse: 82 (04/10/24 0754)  Resp: 16 (04/10/24 0754)  BP: (!) 109/59 (04/10/24 0754)  SpO2: (!) 94 % (04/10/24 0754) Vital Signs (24h Range):  Temp:  [97.8 °F (36.6 °C)-98.7 °F (37.1 °C)] 98.5 °F (36.9 °C)  Pulse:  [] 82  Resp:  [16-18] 16  SpO2:  [93 %-96 %] 94 %  BP: (109-178)/(59-93) 109/59     Weight: 97.5 kg (215 lb)     Body mass index is 29.99 kg/m².    No intake or output data in the 24 hours ending 04/10/24 0940     Ortho/SPM Exam  Physical Exam:  General:  no apparent distress, WDWN  HENT:  NCAT, Bilateral ears/eyes normal  CV:  Normal pulses, color, and cap refill  Lungs:  Normal respiratory effort  Neuro: No FND, awake, alert  Psych:  Oriented to Person, Place, Time, and Situation    MSK:       BUE:  Inspection: Skin intact throughout, no swelling, no effusions, no ecchymosis   Palpation: Non-TTP throughout, no palpable abnormality.   ROM: AROM and PROM of the shoulder, elbow, wrist, and hand intact without pain.   Neuro: AIN/PIN/Radial/Median/Ulnar Nerves assessed in isolation without deficit.   SILT throughout.    Vascular: Radial artery palpated 2+. Capillary refill <3s.         LLE:  Inspection: Skin intact throughout, no swelling, no effusions, no ecchymosis   Palpation: Non-TTP throughout. No palpable abnormality.   ROM: AROM and PROM of the hip, knee, ankle, and foot intact without pain.   Neuro: TA/EHL/Gastroc/FHL assessed in isolation without deficit.   SILT throughout.    Vascular: Foot is WWP. Capillary refill <3s.         RLE:  Inspection: Skin intact throughout, no open wounds  Swelling present at  proximal thigh   Palpation: TTP at proximal thigh; otherwise non-TTP throughout.  Compartments are soft and easily compressible    ROM: AROM and PROM of the knee, ankle, foot intact without pain.  Assessment of hip ROM deferred 2/2 known fracture    Neuro: TA/Gastroc/EHL/FHL assessed in isolation without deficit.   SILT Sa/Block/DP/SP/T nerve distributions   Vascular: DP and PT arteries palpated 2+. Capillary refill <3s.        Spine/pelvis/axial body:  No tenderness to palpation of cervical, thoracic, or lumbar spine  Stable and without pain with direct anterior pressure over ASIS.  No chest wall or abdominal tenderness  No decubitus ulcers  Muscle tone normal      Significant Labs: All pertinent labs within the past 24 hours have been reviewed.    Significant Imaging: I have reviewed and interpreted all pertinent imaging results/findings.  Xrays and CT of the right hip showing a periprosthetic femur fracture with stable-appearing femoral implant in comparison to prior imaging

## 2024-04-10 NOTE — CARE UPDATE
I have reviewed the chart of Blayne Chavis and collaborated with Angela Tripathi MD in the care of the patient who is hospitalized for the following:    Active Hospital Problems    Diagnosis    *Periprosthetic fracture around internal prosthetic left hip joint    Preoperative examination    Controlled type 2 diabetes mellitus without complication, without long-term current use of insulin    DNR (do not resuscitate)    History of stroke    Pure hypercholesterolemia    Primary hypertension          I have reviewed Blayne Chavis with the multidisciplinary team during discharge huddle.       Della Morrison PA-C  [facility]

## 2024-04-10 NOTE — ASSESSMENT & PLAN NOTE
Patient undergoing non-emergent non-cardiac surgery.  Patient does not have active cardiac problems   -baseline EKG ordered; to be followed up.   -reports chronic murmur which is demonstrated on exam without signs of decompensated CHF.   -no history of known CAD, LHC, or stents.   Patient undergoing intermediate risk surgery.  Functional Status: Patient has functional METs > or = 4  Revised cardiac risk index (RCRI) score is 1.         Other Issues:       Patient on long term anticoagulation: No      Recent coronary stenting: No    Patient with acceptable cardiac risk with (RCRI score of 1) going for intermediate risk surgery. No absolute contraindications to the proposed surgery at this time. intermediate risk of post-op pulmonary complications.  intermediate risk of post-op delirium.   - Okay to proceed with planned surgery with no additional cardiac testing needed prior to surgery.

## 2024-04-10 NOTE — ASSESSMENT & PLAN NOTE
Advance Care Planning   I discussed code status with patient on admission. he states that he would never want CPR or intubation/mechanical ventilation in the event of cardiopulmonary arrest, in agreement with DNR status. His wife is his surrogate decision maker, and he reports that he has discussed this with her.

## 2024-04-10 NOTE — ASSESSMENT & PLAN NOTE
Outside x-ray showed right proximal trochanteric fracture with displacement and comminuted component, sustained after a mechanical fall.  Placed on hip fracture pathway.  Appreciate Orthopedic's recommendations.  We will keep NPO after midnight.

## 2024-04-10 NOTE — ASSESSMENT & PLAN NOTE
Chronic, controlled. Latest blood pressure and vitals reviewed-     Temp:  [97.4 °F (36.3 °C)-99.1 °F (37.3 °C)]   Pulse:  []   Resp:  [16-18]   BP: (109-178)/(59-93)   SpO2:  [87 %-96 %] .   Home meds for hypertension were reviewed and noted below.   Hypertension Medications               amLODIPine (NORVASC) 10 MG tablet Take 1 tablet (10 mg total) by mouth once daily.    losartan (COZAAR) 25 MG tablet Take 1 tablet (25 mg total) by mouth once daily.            While in the hospital, will manage blood pressure as follows; Adjust home antihypertensive regimen as follows- Continue home Norvasc to treat HTN but hold Losartan for surgery and resume post-op as BP tolerates.    Will utilize p.r.n. blood pressure medication only if patient's blood pressure greater than 180/110 and he develops symptoms such as worsening chest pain or shortness of breath.

## 2024-04-10 NOTE — ASSESSMENT & PLAN NOTE
Chronic and controlled. Continue home Lipitor to treat. Hold Aspirin for surgery and resume post-op.

## 2024-04-10 NOTE — H&P
[facility] Medicine  History & Physical    Patient Name: Blayne Chavis  MRN: [id number]  Patient Class: IP- Inpatient  Admission Date: 4/9/2024  Attending Physician: Cale Fernandez MD   Primary Care Provider: Jon Baca MD         Patient information was obtained from patient and past medical records.     Subjective:     Principal Problem:Closed fracture of femur    Chief Complaint: No chief complaint on file.       HPI: Blayne Chavis is a 73 y.o. male with history of CVA with residual mild right-sided weakness, history of R hip arthroplasty, HTN, HLD [facility].     He reports that when walking today, he stubbed his toe, causing him to fall to the ground.  He immediately noted right hip pain, however he thought this was due to just a strain, and continue to walk.  However, the pain did not resolve.  [facility]  He denies any presyncopal symptoms or loss of consciousness.  At baseline, he has mild residual right-sided weakness from his prior CVA but is able to ambulate without any assistive devices.    Reportedly XR showed R proximal trochanteric fracture with displacement and comminuted component. For this reason, he was transferred here due to need for Orthopedics.     Past Medical History:   Diagnosis Date    GERD (gastroesophageal reflux disease)     Hypertension     Sleep apnea     cannot tolerate CPAP    Stroke     Hemorrhagic stroke -2000       Past Surgical History:   Procedure Laterality Date    APPENDECTOMY      BACK SURGERY      laminectomy    HIP SURGERY  6-9-14    right    TOE SURGERY Right 2/2016    hammer toe surg       Review of patient's allergies indicates:   Allergen Reactions    Sulfa (sulfonamide antibiotics) Other (See Comments)       No current facility-administered medications on file prior to encounter.     Current Outpatient Medications on File Prior to Encounter   Medication Sig    aspirin (ECOTRIN)  325 MG EC tablet Take 325 mg by mouth once daily.    atorvastatin (LIPITOR) 40 MG tablet Take 40 mg by mouth once daily.    hydrochlorothiazide (MICROZIDE) 12.5 mg capsule Take 12.5 mg by mouth every 48 hours.      HYDROcodone-acetaminophen (NORCO) 5-325 mg per tablet Take 1 tablet by mouth every 6 (six) hours as needed for Pain.    HYDROcodone-acetaminophen (NORCO) 5-325 mg per tablet Take 1 tablet by mouth every 4 (four) hours as needed for Pain.    hydrocodone-acetaminophen 10-325mg (NORCO)  mg Tab     ibuprofen (ADVIL,MOTRIN) 400 MG tablet Take 1 tablet (400 mg total) by mouth every 6 (six) hours as needed (pain).    losartan (COZAAR) 25 MG tablet Take 1 tablet (25 mg total) by mouth once daily.    naproxen (NAPROSYN) 500 MG tablet     omeprazole (PRILOSEC) 20 MG capsule Take 20 mg by mouth once daily.      ondansetron (ZOFRAN-ODT) 4 MG TbDL Take 1 tablet (4 mg total) by mouth every 8 (eight) hours as needed (nausea).    oxycodone (ROXICODONE) 5 MG immediate release tablet Take 1 - 2 tablets (5-10mg) by mouth every 4-6 hours as needed for moderate to severe pain    senna-docusate 8.6-50 mg (PERICOLACE) 8.6-50 mg per tablet Take 1 tablet by mouth daily as needed for Constipation.    tamsulosin (FLOMAX) 0.4 mg Cap Take 1 capsule (0.4 mg total) by mouth once daily.     Family History       Problem Relation (Age of Onset)    Cancer Father    Stroke Maternal Grandmother, Paternal Grandfather          Tobacco Use    Smoking status: Never    Smokeless tobacco: Never   Substance and Sexual Activity    Alcohol use: Yes     Alcohol/week: 2.0 standard drinks of alcohol     Types: 2 Glasses of wine per week    Drug use: No    Sexual activity: Not on file     Review of Systems   Constitutional:         All pertinent other ROS noted in HPI   All other systems reviewed and are negative.    Objective:     Vital Signs (Most Recent):    Vital Signs (24h Range):  Temp:  [98.4 °F (36.9 °C)] 98.4 °F (36.9 °C)  Pulse:  [107]  "107  Resp:  [18] 18  SpO2:  [95 %] 95 %  BP: (178)/(93) 178/93        There is no height or weight on file to calculate BMI.     Physical Exam  Vitals and nursing note reviewed.   Constitutional:       General: He is not in acute distress.     Appearance: He is well-developed. He is not ill-appearing or diaphoretic.   HENT:      Head: Normocephalic and atraumatic.   Eyes:      General: No scleral icterus.     Conjunctiva/sclera: Conjunctivae normal.   Neck:      Vascular: No JVD.   Cardiovascular:      Rate and Rhythm: Normal rate and regular rhythm.      Heart sounds: Murmur (Harsh systolic murmur) heard.   Pulmonary:      Effort: Pulmonary effort is normal. No respiratory distress.   Musculoskeletal:      Left lower leg: No edema.      Comments: Full MSK exam deferred due to right hip fracture and pain   Skin:     Coloration: Skin is not jaundiced or pale.   Neurological:      Mental Status: He is alert and oriented to person, place, and time.      Motor: No abnormal muscle tone.   Psychiatric:         Mood and Affect: Mood normal.         Behavior: Behavior normal.                Significant Labs: All pertinent labs within the past 24 hours have been reviewed.  CBC: No results for input(s): "WBC", "HGB", "HCT", "PLT" in the last 48 hours.  CMP: No results for input(s): "NA", "K", "CL", "CO2", "GLU", "BUN", "CREATININE", "CALCIUM", "PROT", "ALBUMIN", "BILITOT", "ALKPHOS", "AST", "ALT", "ANIONGAP", "EGFRNONAA" in the last 48 hours.    Invalid input(s): "ESTGFAFRICA"    Reviewed outside hospital labs    Reviewed outside hospital x-ray    Ordered EKG    Significant Imaging: I have reviewed all pertinent imaging results/findings within the past 24 hours.  Assessment/Plan:     * Closed fracture of femur  Outside x-ray showed right proximal trochanteric fracture with displacement and comminuted component, sustained after a mechanical fall.  Placed on hip fracture pathway.  Appreciate Orthopedic's recommendations.  We " will keep NPO after midnight.      Controlled type 2 diabetes mellitus  History of diabetes per chart review, maintained without insulin.  Monitor on subsequent labs.  As to avoid hypoglycemia given NPO status, we will not start SSI for now.      Preoperative examination  Patient undergoing non-emergent non-cardiac surgery.  Patient does not have active cardiac problems   -baseline EKG ordered; to be followed up.   -reports chronic murmur which is demonstrated on exam without signs of decompensated CHF.   -no history of known CAD, LHC, or stents.   Patient undergoing intermediate risk surgery.  Functional Status: Patient has functional METs > or = 4  Revised cardiac risk index (RCRI) score is 1.         Other Issues:       Patient on long term anticoagulation: No      Recent coronary stenting: No    Patient with acceptable cardiac risk with (RCRI score of 1) going for intermediate risk surgery. No absolute contraindications to the proposed surgery at this time. intermediate risk of post-op pulmonary complications.  intermediate risk of post-op delirium.   - Okay to proceed with planned surgery with no additional cardiac testing needed prior to surgery.    History of stroke  Continue statin.  Resume aspirin after surgery.      Hyperlipidemia associated with type 2 diabetes mellitus  Continue hospital formulary of home statin.      Hypertension associated with diabetes  Patient unclear on home antihypertensive regimen.  Moderately hypertensive, likely somewhat secondary to pain.  We will continue amlodipine for now, and continue losartan if he is still on this.        VTE Risk Mitigation (From admission, onward)           Ordered     Reason for no Mechanical VTE Prophylaxis  Once        Question:  Reasons:  Answer:  Risk of Bleeding    04/09/24 2115     IP VTE HIGH RISK PATIENT  Once         04/09/24 2115     Reason for No Pharmacological VTE Prophylaxis  Once        Question:  Reasons:  Answer:  Risk of Bleeding     04/09/24 2115     Place sequential compression device  Until discontinued         04/09/24 2113                                    Reuben Castrejon MD  Department of [facility] Medicine  [facility] - Surgery

## 2024-04-11 ENCOUNTER — ANESTHESIA (OUTPATIENT)
Dept: SURGERY | Facility: HOSPITAL | Age: 74
DRG: 481 | End: 2024-04-11
Payer: MEDICARE

## 2024-04-11 LAB
25(OH)D3+25(OH)D2 SERPL-MCNC: 24 NG/ML (ref 30–96)
ANION GAP SERPL CALC-SCNC: 10 MMOL/L (ref 8–16)
BASOPHILS # BLD AUTO: 0 K/UL (ref 0–0.2)
BASOPHILS # BLD AUTO: 0.01 K/UL (ref 0–0.2)
BASOPHILS NFR BLD: 0 % (ref 0–1.9)
BASOPHILS NFR BLD: 0.2 % (ref 0–1.9)
BUN SERPL-MCNC: 18 MG/DL (ref 8–23)
CALCIUM SERPL-MCNC: 7.9 MG/DL (ref 8.7–10.5)
CHLORIDE SERPL-SCNC: 105 MMOL/L (ref 95–110)
CO2 SERPL-SCNC: 23 MMOL/L (ref 23–29)
CREAT SERPL-MCNC: 0.7 MG/DL (ref 0.5–1.4)
DIFFERENTIAL METHOD BLD: ABNORMAL
DIFFERENTIAL METHOD BLD: ABNORMAL
EOSINOPHIL # BLD AUTO: 0 K/UL (ref 0–0.5)
EOSINOPHIL # BLD AUTO: 0 K/UL (ref 0–0.5)
EOSINOPHIL NFR BLD: 0 % (ref 0–8)
EOSINOPHIL NFR BLD: 0 % (ref 0–8)
ERYTHROCYTE [DISTWIDTH] IN BLOOD BY AUTOMATED COUNT: 12.9 % (ref 11.5–14.5)
ERYTHROCYTE [DISTWIDTH] IN BLOOD BY AUTOMATED COUNT: 13 % (ref 11.5–14.5)
EST. GFR  (NO RACE VARIABLE): >60 ML/MIN/1.73 M^2
ESTIMATED AVG GLUCOSE: 146 MG/DL (ref 68–131)
GLUCOSE SERPL-MCNC: 152 MG/DL (ref 70–110)
HBA1C MFR BLD: 6.7 % (ref 4–5.6)
HCT VFR BLD AUTO: 34.1 % (ref 40–54)
HCT VFR BLD AUTO: 34.6 % (ref 40–54)
HGB BLD-MCNC: 11.3 G/DL (ref 14–18)
HGB BLD-MCNC: 11.4 G/DL (ref 14–18)
IMM GRANULOCYTES # BLD AUTO: 0.02 K/UL (ref 0–0.04)
IMM GRANULOCYTES # BLD AUTO: 0.02 K/UL (ref 0–0.04)
IMM GRANULOCYTES NFR BLD AUTO: 0.4 % (ref 0–0.5)
IMM GRANULOCYTES NFR BLD AUTO: 0.4 % (ref 0–0.5)
LYMPHOCYTES # BLD AUTO: 0.2 K/UL (ref 1–4.8)
LYMPHOCYTES # BLD AUTO: 0.3 K/UL (ref 1–4.8)
LYMPHOCYTES NFR BLD: 4.7 % (ref 18–48)
LYMPHOCYTES NFR BLD: 5.8 % (ref 18–48)
MAGNESIUM SERPL-MCNC: 2.2 MG/DL (ref 1.6–2.6)
MCH RBC QN AUTO: 29.4 PG (ref 27–31)
MCH RBC QN AUTO: 29.8 PG (ref 27–31)
MCHC RBC AUTO-ENTMCNC: 32.9 G/DL (ref 32–36)
MCHC RBC AUTO-ENTMCNC: 33.1 G/DL (ref 32–36)
MCV RBC AUTO: 89 FL (ref 82–98)
MCV RBC AUTO: 90 FL (ref 82–98)
MONOCYTES # BLD AUTO: 0.2 K/UL (ref 0.3–1)
MONOCYTES # BLD AUTO: 0.2 K/UL (ref 0.3–1)
MONOCYTES NFR BLD: 3.5 % (ref 4–15)
MONOCYTES NFR BLD: 3.9 % (ref 4–15)
NEUTROPHILS # BLD AUTO: 4.3 K/UL (ref 1.8–7.7)
NEUTROPHILS # BLD AUTO: 4.4 K/UL (ref 1.8–7.7)
NEUTROPHILS NFR BLD: 90.3 % (ref 38–73)
NEUTROPHILS NFR BLD: 90.8 % (ref 38–73)
NRBC BLD-RTO: 0 /100 WBC
NRBC BLD-RTO: 0 /100 WBC
PHOSPHATE SERPL-MCNC: 3.7 MG/DL (ref 2.7–4.5)
PLATELET # BLD AUTO: 130 K/UL (ref 150–450)
PLATELET # BLD AUTO: 146 K/UL (ref 150–450)
PMV BLD AUTO: 10 FL (ref 9.2–12.9)
PMV BLD AUTO: 10.4 FL (ref 9.2–12.9)
POCT GLUCOSE: 110 MG/DL (ref 70–110)
POCT GLUCOSE: 126 MG/DL (ref 70–110)
POCT GLUCOSE: 161 MG/DL (ref 70–110)
POCT GLUCOSE: 237 MG/DL (ref 70–110)
POTASSIUM SERPL-SCNC: 4.3 MMOL/L (ref 3.5–5.1)
RBC # BLD AUTO: 3.83 M/UL (ref 4.6–6.2)
RBC # BLD AUTO: 3.85 M/UL (ref 4.6–6.2)
SODIUM SERPL-SCNC: 138 MMOL/L (ref 136–145)
WBC # BLD AUTO: 4.79 K/UL (ref 3.9–12.7)
WBC # BLD AUTO: 4.89 K/UL (ref 3.9–12.7)

## 2024-04-11 PROCEDURE — 25000003 PHARM REV CODE 250: Performed by: NURSE ANESTHETIST, CERTIFIED REGISTERED

## 2024-04-11 PROCEDURE — 63600175 PHARM REV CODE 636 W HCPCS: Performed by: ORTHOPAEDIC SURGERY

## 2024-04-11 PROCEDURE — 25000003 PHARM REV CODE 250

## 2024-04-11 PROCEDURE — 63600175 PHARM REV CODE 636 W HCPCS

## 2024-04-11 PROCEDURE — 80048 BASIC METABOLIC PNL TOTAL CA: CPT | Performed by: STUDENT IN AN ORGANIZED HEALTH CARE EDUCATION/TRAINING PROGRAM

## 2024-04-11 PROCEDURE — 25000003 PHARM REV CODE 250: Performed by: INTERNAL MEDICINE

## 2024-04-11 PROCEDURE — 0QU60KZ SUPPLEMENT RIGHT UPPER FEMUR WITH NONAUTOLOGOUS TISSUE SUBSTITUTE, OPEN APPROACH: ICD-10-PCS | Performed by: ORTHOPAEDIC SURGERY

## 2024-04-11 PROCEDURE — 82306 VITAMIN D 25 HYDROXY: CPT | Performed by: INTERNAL MEDICINE

## 2024-04-11 PROCEDURE — 64448 NJX AA&/STRD FEM NRV NFS IMG: CPT

## 2024-04-11 PROCEDURE — 27201423 OPTIME MED/SURG SUP & DEVICES STERILE SUPPLY: Performed by: ORTHOPAEDIC SURGERY

## 2024-04-11 PROCEDURE — 27244 TREAT THIGH FRACTURE: CPT | Mod: 22,RT,GC, | Performed by: ORTHOPAEDIC SURGERY

## 2024-04-11 PROCEDURE — 71000015 HC POSTOP RECOV 1ST HR: Performed by: ORTHOPAEDIC SURGERY

## 2024-04-11 PROCEDURE — 71000039 HC RECOVERY, EACH ADD'L HOUR: Performed by: ORTHOPAEDIC SURGERY

## 2024-04-11 PROCEDURE — 25000003 PHARM REV CODE 250: Performed by: STUDENT IN AN ORGANIZED HEALTH CARE EDUCATION/TRAINING PROGRAM

## 2024-04-11 PROCEDURE — 36000710: Performed by: ORTHOPAEDIC SURGERY

## 2024-04-11 PROCEDURE — C1713 ANCHOR/SCREW BN/BN,TIS/BN: HCPCS | Performed by: ORTHOPAEDIC SURGERY

## 2024-04-11 PROCEDURE — 99223 1ST HOSP IP/OBS HIGH 75: CPT | Mod: 57,GC,, | Performed by: ORTHOPAEDIC SURGERY

## 2024-04-11 PROCEDURE — 83735 ASSAY OF MAGNESIUM: CPT | Performed by: STUDENT IN AN ORGANIZED HEALTH CARE EDUCATION/TRAINING PROGRAM

## 2024-04-11 PROCEDURE — 27000221 HC OXYGEN, UP TO 24 HOURS

## 2024-04-11 PROCEDURE — 83036 HEMOGLOBIN GLYCOSYLATED A1C: CPT | Performed by: INTERNAL MEDICINE

## 2024-04-11 PROCEDURE — 64999 UNLISTED PX NERVOUS SYSTEM: CPT | Mod: GC,,, | Performed by: ANESTHESIOLOGY

## 2024-04-11 PROCEDURE — 84100 ASSAY OF PHOSPHORUS: CPT | Performed by: STUDENT IN AN ORGANIZED HEALTH CARE EDUCATION/TRAINING PROGRAM

## 2024-04-11 PROCEDURE — 76942 ECHO GUIDE FOR BIOPSY: CPT | Mod: 26,GC,, | Performed by: ANESTHESIOLOGY

## 2024-04-11 PROCEDURE — 21400001 HC TELEMETRY ROOM

## 2024-04-11 PROCEDURE — 85025 COMPLETE CBC W/AUTO DIFF WBC: CPT | Mod: 91 | Performed by: STUDENT IN AN ORGANIZED HEALTH CARE EDUCATION/TRAINING PROGRAM

## 2024-04-11 PROCEDURE — 63600175 PHARM REV CODE 636 W HCPCS: Performed by: NURSE ANESTHETIST, CERTIFIED REGISTERED

## 2024-04-11 PROCEDURE — 36000711: Performed by: ORTHOPAEDIC SURGERY

## 2024-04-11 PROCEDURE — 63600175 PHARM REV CODE 636 W HCPCS: Performed by: STUDENT IN AN ORGANIZED HEALTH CARE EDUCATION/TRAINING PROGRAM

## 2024-04-11 PROCEDURE — 37000008 HC ANESTHESIA 1ST 15 MINUTES: Performed by: ORTHOPAEDIC SURGERY

## 2024-04-11 PROCEDURE — 71000033 HC RECOVERY, INTIAL HOUR: Performed by: ORTHOPAEDIC SURGERY

## 2024-04-11 PROCEDURE — 94761 N-INVAS EAR/PLS OXIMETRY MLT: CPT

## 2024-04-11 PROCEDURE — 37000009 HC ANESTHESIA EA ADD 15 MINS: Performed by: ORTHOPAEDIC SURGERY

## 2024-04-11 PROCEDURE — 3E0T3BZ INTRODUCTION OF ANESTHETIC AGENT INTO PERIPHERAL NERVES AND PLEXI, PERCUTANEOUS APPROACH: ICD-10-PCS | Performed by: ANESTHESIOLOGY

## 2024-04-11 PROCEDURE — D9220A PRA ANESTHESIA: Mod: ANES,,, | Performed by: ANESTHESIOLOGY

## 2024-04-11 PROCEDURE — 63600175 PHARM REV CODE 636 W HCPCS: Performed by: INTERNAL MEDICINE

## 2024-04-11 PROCEDURE — 0QS604Z REPOSITION RIGHT UPPER FEMUR WITH INTERNAL FIXATION DEVICE, OPEN APPROACH: ICD-10-PCS | Performed by: ORTHOPAEDIC SURGERY

## 2024-04-11 PROCEDURE — D9220A PRA ANESTHESIA: Mod: CRNA,,, | Performed by: NURSE ANESTHETIST, CERTIFIED REGISTERED

## 2024-04-11 PROCEDURE — 63600175 PHARM REV CODE 636 W HCPCS: Performed by: ANESTHESIOLOGY

## 2024-04-11 DEVICE — SET CABLE BEADED 2MM: Type: IMPLANTABLE DEVICE | Site: HIP | Status: FUNCTIONAL

## 2024-04-11 DEVICE — IMPLANTABLE DEVICE: Type: IMPLANTABLE DEVICE | Site: HIP | Status: FUNCTIONAL

## 2024-04-11 RX ORDER — ACETAMINOPHEN 500 MG
1000 TABLET ORAL EVERY 6 HOURS
Status: DISPENSED | OUTPATIENT
Start: 2024-04-11 | End: 2024-04-13

## 2024-04-11 RX ORDER — ONDANSETRON HYDROCHLORIDE 2 MG/ML
INJECTION, SOLUTION INTRAVENOUS
Status: DISCONTINUED | OUTPATIENT
Start: 2024-04-11 | End: 2024-04-11

## 2024-04-11 RX ORDER — ROCURONIUM BROMIDE 10 MG/ML
INJECTION, SOLUTION INTRAVENOUS
Status: DISCONTINUED | OUTPATIENT
Start: 2024-04-11 | End: 2024-04-11

## 2024-04-11 RX ORDER — MUPIROCIN 20 MG/G
1 OINTMENT TOPICAL 2 TIMES DAILY
Status: DISCONTINUED | OUTPATIENT
Start: 2024-04-12 | End: 2024-04-11

## 2024-04-11 RX ORDER — LIDOCAINE HYDROCHLORIDE 10 MG/ML
1 INJECTION, SOLUTION EPIDURAL; INFILTRATION; INTRACAUDAL; PERINEURAL
Status: DISCONTINUED | OUTPATIENT
Start: 2024-04-11 | End: 2024-04-11 | Stop reason: HOSPADM

## 2024-04-11 RX ORDER — LIDOCAINE HYDROCHLORIDE 20 MG/ML
INJECTION INTRAVENOUS
Status: DISCONTINUED | OUTPATIENT
Start: 2024-04-11 | End: 2024-04-11

## 2024-04-11 RX ORDER — MUPIROCIN 20 MG/G
OINTMENT TOPICAL
Status: DISCONTINUED | OUTPATIENT
Start: 2024-04-11 | End: 2024-04-11 | Stop reason: HOSPADM

## 2024-04-11 RX ORDER — MIDAZOLAM HYDROCHLORIDE 1 MG/ML
INJECTION, SOLUTION INTRAMUSCULAR; INTRAVENOUS
Status: DISCONTINUED
Start: 2024-04-11 | End: 2024-04-11 | Stop reason: WASHOUT

## 2024-04-11 RX ORDER — PROPOFOL 10 MG/ML
VIAL (ML) INTRAVENOUS
Status: DISCONTINUED | OUTPATIENT
Start: 2024-04-11 | End: 2024-04-11

## 2024-04-11 RX ORDER — PHENYLEPHRINE HYDROCHLORIDE 10 MG/ML
INJECTION INTRAVENOUS
Status: DISCONTINUED | OUTPATIENT
Start: 2024-04-11 | End: 2024-04-11

## 2024-04-11 RX ORDER — SODIUM CHLORIDE 0.9 % (FLUSH) 0.9 %
10 SYRINGE (ML) INJECTION
Status: DISCONTINUED | OUTPATIENT
Start: 2024-04-11 | End: 2024-04-11 | Stop reason: HOSPADM

## 2024-04-11 RX ORDER — HYDROMORPHONE HYDROCHLORIDE 1 MG/ML
0.2 INJECTION, SOLUTION INTRAMUSCULAR; INTRAVENOUS; SUBCUTANEOUS EVERY 5 MIN PRN
Status: DISCONTINUED | OUTPATIENT
Start: 2024-04-11 | End: 2024-04-11 | Stop reason: HOSPADM

## 2024-04-11 RX ORDER — POLYETHYLENE GLYCOL 3350 17 G/17G
17 POWDER, FOR SOLUTION ORAL DAILY
Status: DISCONTINUED | OUTPATIENT
Start: 2024-04-11 | End: 2024-04-11

## 2024-04-11 RX ORDER — KETAMINE HCL IN 0.9 % NACL 50 MG/5 ML
SYRINGE (ML) INTRAVENOUS
Status: DISCONTINUED | OUTPATIENT
Start: 2024-04-11 | End: 2024-04-11

## 2024-04-11 RX ORDER — CELECOXIB 200 MG/1
200 CAPSULE ORAL DAILY
Status: DISCONTINUED | OUTPATIENT
Start: 2024-04-11 | End: 2024-04-14 | Stop reason: HOSPADM

## 2024-04-11 RX ORDER — ROPIVACAINE HYDROCHLORIDE 2 MG/ML
0.1 INJECTION, SOLUTION EPIDURAL; INFILTRATION; PERINEURAL CONTINUOUS
Status: DISCONTINUED | OUTPATIENT
Start: 2024-04-11 | End: 2024-04-11

## 2024-04-11 RX ORDER — DEXAMETHASONE SODIUM PHOSPHATE 4 MG/ML
INJECTION, SOLUTION INTRA-ARTICULAR; INTRALESIONAL; INTRAMUSCULAR; INTRAVENOUS; SOFT TISSUE
Status: DISCONTINUED | OUTPATIENT
Start: 2024-04-11 | End: 2024-04-11

## 2024-04-11 RX ORDER — ROPIVACAINE HYDROCHLORIDE 2 MG/ML
0.1 INJECTION, SOLUTION EPIDURAL; INFILTRATION; PERINEURAL CONTINUOUS
Status: DISCONTINUED | OUTPATIENT
Start: 2024-04-11 | End: 2024-04-14

## 2024-04-11 RX ORDER — MUPIROCIN 20 MG/G
1 OINTMENT TOPICAL
Status: DISCONTINUED | OUTPATIENT
Start: 2024-04-11 | End: 2024-04-11 | Stop reason: HOSPADM

## 2024-04-11 RX ORDER — VANCOMYCIN HYDROCHLORIDE 1 G/20ML
INJECTION, POWDER, LYOPHILIZED, FOR SOLUTION INTRAVENOUS
Status: DISCONTINUED | OUTPATIENT
Start: 2024-04-11 | End: 2024-04-11 | Stop reason: HOSPADM

## 2024-04-11 RX ORDER — ONDANSETRON HYDROCHLORIDE 2 MG/ML
4 INJECTION, SOLUTION INTRAVENOUS EVERY 12 HOURS PRN
Status: DISCONTINUED | OUTPATIENT
Start: 2024-04-11 | End: 2024-04-11

## 2024-04-11 RX ORDER — AMOXICILLIN 250 MG
1 CAPSULE ORAL 2 TIMES DAILY
Status: DISCONTINUED | OUTPATIENT
Start: 2024-04-11 | End: 2024-04-11

## 2024-04-11 RX ORDER — METHOCARBAMOL 500 MG/1
500 TABLET, FILM COATED ORAL EVERY 6 HOURS PRN
Status: DISCONTINUED | OUTPATIENT
Start: 2024-04-11 | End: 2024-04-11

## 2024-04-11 RX ORDER — TRANEXAMIC ACID 100 MG/ML
INJECTION, SOLUTION INTRAVENOUS
Status: DISCONTINUED | OUTPATIENT
Start: 2024-04-11 | End: 2024-04-11

## 2024-04-11 RX ORDER — MIDAZOLAM HYDROCHLORIDE 1 MG/ML
INJECTION INTRAMUSCULAR; INTRAVENOUS
Status: DISCONTINUED | OUTPATIENT
Start: 2024-04-11 | End: 2024-04-11

## 2024-04-11 RX ORDER — OXYCODONE HYDROCHLORIDE 5 MG/1
5 TABLET ORAL EVERY 4 HOURS PRN
Status: DISCONTINUED | OUTPATIENT
Start: 2024-04-11 | End: 2024-04-12

## 2024-04-11 RX ORDER — ROPIVACAINE HYDROCHLORIDE 5 MG/ML
INJECTION, SOLUTION EPIDURAL; INFILTRATION; PERINEURAL
Status: COMPLETED | OUTPATIENT
Start: 2024-04-11 | End: 2024-04-11

## 2024-04-11 RX ORDER — METHOCARBAMOL 500 MG/1
500 TABLET, FILM COATED ORAL EVERY 6 HOURS
Status: DISCONTINUED | OUTPATIENT
Start: 2024-04-11 | End: 2024-04-13

## 2024-04-11 RX ORDER — EPHEDRINE SULFATE 50 MG/ML
INJECTION, SOLUTION INTRAVENOUS
Status: DISCONTINUED | OUTPATIENT
Start: 2024-04-11 | End: 2024-04-11

## 2024-04-11 RX ORDER — FENTANYL CITRATE 50 UG/ML
25-200 INJECTION, SOLUTION INTRAMUSCULAR; INTRAVENOUS
Status: DISCONTINUED | OUTPATIENT
Start: 2024-04-11 | End: 2024-04-11

## 2024-04-11 RX ORDER — FENTANYL CITRATE 50 UG/ML
25 INJECTION, SOLUTION INTRAMUSCULAR; INTRAVENOUS EVERY 5 MIN PRN
Status: DISCONTINUED | OUTPATIENT
Start: 2024-04-11 | End: 2024-04-11 | Stop reason: HOSPADM

## 2024-04-11 RX ORDER — MIDAZOLAM HYDROCHLORIDE 1 MG/ML
.5-4 INJECTION, SOLUTION INTRAMUSCULAR; INTRAVENOUS
Status: DISCONTINUED | OUTPATIENT
Start: 2024-04-11 | End: 2024-04-11

## 2024-04-11 RX ORDER — FENTANYL CITRATE 50 UG/ML
INJECTION, SOLUTION INTRAMUSCULAR; INTRAVENOUS
Status: DISCONTINUED | OUTPATIENT
Start: 2024-04-11 | End: 2024-04-11

## 2024-04-11 RX ADMIN — PHENYLEPHRINE HYDROCHLORIDE 100 MCG: 10 INJECTION INTRAVENOUS at 07:04

## 2024-04-11 RX ADMIN — FENTANYL CITRATE 50 MCG: 50 INJECTION, SOLUTION INTRAMUSCULAR; INTRAVENOUS at 10:04

## 2024-04-11 RX ADMIN — ACETAMINOPHEN 1000 MG: 500 TABLET ORAL at 06:04

## 2024-04-11 RX ADMIN — SUGAMMADEX 200 MG: 100 INJECTION, SOLUTION INTRAVENOUS at 10:04

## 2024-04-11 RX ADMIN — ROCURONIUM BROMIDE 50 MG: 10 INJECTION, SOLUTION INTRAVENOUS at 07:04

## 2024-04-11 RX ADMIN — DOCUSATE SODIUM AND SENNOSIDES 1 TABLET: 8.6; 5 TABLET, FILM COATED ORAL at 10:04

## 2024-04-11 RX ADMIN — OXYCODONE 5 MG: 5 TABLET ORAL at 10:04

## 2024-04-11 RX ADMIN — Medication 6 MG: at 10:04

## 2024-04-11 RX ADMIN — TRANEXAMIC ACID 1000 MG: 100 INJECTION, SOLUTION INTRAVENOUS at 10:04

## 2024-04-11 RX ADMIN — ONDANSETRON 4 MG: 2 INJECTION INTRAMUSCULAR; INTRAVENOUS at 10:04

## 2024-04-11 RX ADMIN — ROPIVACAINE HYDROCHLORIDE 0.1 ML/HR: 2 INJECTION, SOLUTION EPIDURAL; INFILTRATION at 12:04

## 2024-04-11 RX ADMIN — ROCURONIUM BROMIDE 10 MG: 10 INJECTION, SOLUTION INTRAVENOUS at 09:04

## 2024-04-11 RX ADMIN — CEFAZOLIN 2 G: 2 INJECTION, POWDER, FOR SOLUTION INTRAMUSCULAR; INTRAVENOUS at 04:04

## 2024-04-11 RX ADMIN — PHENYLEPHRINE HYDROCHLORIDE 100 MCG: 10 INJECTION INTRAVENOUS at 08:04

## 2024-04-11 RX ADMIN — ROCURONIUM BROMIDE 20 MG: 10 INJECTION, SOLUTION INTRAVENOUS at 08:04

## 2024-04-11 RX ADMIN — ROPIVACAINE HYDROCHLORIDE 20 ML: 5 INJECTION EPIDURAL; INFILTRATION; PERINEURAL at 06:04

## 2024-04-11 RX ADMIN — LIDOCAINE HYDROCHLORIDE 70 MG: 20 INJECTION INTRAVENOUS at 07:04

## 2024-04-11 RX ADMIN — ACETAMINOPHEN 1000 MG: 500 TABLET ORAL at 11:04

## 2024-04-11 RX ADMIN — VANCOMYCIN HYDROCHLORIDE 1000 MG: 1 INJECTION, POWDER, LYOPHILIZED, FOR SOLUTION INTRAVENOUS at 06:04

## 2024-04-11 RX ADMIN — DEXAMETHASONE SODIUM PHOSPHATE 8 MG: 4 INJECTION, SOLUTION INTRAMUSCULAR; INTRAVENOUS at 08:04

## 2024-04-11 RX ADMIN — ROCURONIUM BROMIDE 20 MG: 10 INJECTION, SOLUTION INTRAVENOUS at 09:04

## 2024-04-11 RX ADMIN — MUPIROCIN: 20 OINTMENT TOPICAL at 06:04

## 2024-04-11 RX ADMIN — PHENYLEPHRINE HYDROCHLORIDE 0.25 MCG/KG/MIN: 10 INJECTION INTRAVENOUS at 08:04

## 2024-04-11 RX ADMIN — FENTANYL CITRATE 50 MCG: 50 INJECTION INTRAMUSCULAR; INTRAVENOUS at 06:04

## 2024-04-11 RX ADMIN — SODIUM CHLORIDE: 0.9 INJECTION, SOLUTION INTRAVENOUS at 07:04

## 2024-04-11 RX ADMIN — METHOCARBAMOL 500 MG: 500 TABLET ORAL at 06:04

## 2024-04-11 RX ADMIN — METHOCARBAMOL 500 MG: 500 TABLET ORAL at 11:04

## 2024-04-11 RX ADMIN — CEFAZOLIN 2 G: 2 INJECTION, POWDER, FOR SOLUTION INTRAMUSCULAR; INTRAVENOUS at 07:04

## 2024-04-11 RX ADMIN — OXYCODONE HYDROCHLORIDE 10 MG: 10 TABLET ORAL at 11:04

## 2024-04-11 RX ADMIN — INSULIN ASPART 1 UNITS: 100 INJECTION, SOLUTION INTRAVENOUS; SUBCUTANEOUS at 10:04

## 2024-04-11 RX ADMIN — PROPOFOL 150 MG: 10 INJECTION, EMULSION INTRAVENOUS at 07:04

## 2024-04-11 RX ADMIN — Medication 30 MG: at 08:04

## 2024-04-11 RX ADMIN — SODIUM CHLORIDE, SODIUM GLUCONATE, SODIUM ACETATE, POTASSIUM CHLORIDE, MAGNESIUM CHLORIDE, SODIUM PHOSPHATE, DIBASIC, AND POTASSIUM PHOSPHATE: .53; .5; .37; .037; .03; .012; .00082 INJECTION, SOLUTION INTRAVENOUS at 09:04

## 2024-04-11 RX ADMIN — CELECOXIB 200 MG: 200 CAPSULE ORAL at 11:04

## 2024-04-11 RX ADMIN — APIXABAN 2.5 MG: 2.5 TABLET, FILM COATED ORAL at 10:04

## 2024-04-11 RX ADMIN — CEFAZOLIN 2 G: 2 INJECTION, POWDER, FOR SOLUTION INTRAMUSCULAR; INTRAVENOUS at 11:04

## 2024-04-11 RX ADMIN — MUPIROCIN: 20 OINTMENT TOPICAL at 10:04

## 2024-04-11 RX ADMIN — EPHEDRINE SULFATE 5 MG: 50 INJECTION INTRAVENOUS at 08:04

## 2024-04-11 RX ADMIN — TRANEXAMIC ACID 1000 MG: 100 INJECTION, SOLUTION INTRAVENOUS at 07:04

## 2024-04-11 RX ADMIN — SODIUM CHLORIDE, SODIUM GLUCONATE, SODIUM ACETATE, POTASSIUM CHLORIDE, MAGNESIUM CHLORIDE, SODIUM PHOSPHATE, DIBASIC, AND POTASSIUM PHOSPHATE: .53; .5; .37; .037; .03; .012; .00082 INJECTION, SOLUTION INTRAVENOUS at 08:04

## 2024-04-11 RX ADMIN — FENTANYL CITRATE 50 MCG: 50 INJECTION, SOLUTION INTRAMUSCULAR; INTRAVENOUS at 07:04

## 2024-04-11 RX ADMIN — MIDAZOLAM HYDROCHLORIDE 1 MG: 2 INJECTION, SOLUTION INTRAMUSCULAR; INTRAVENOUS at 07:04

## 2024-04-11 RX ADMIN — PHENYLEPHRINE HYDROCHLORIDE 200 MCG: 10 INJECTION INTRAVENOUS at 08:04

## 2024-04-11 NOTE — ANESTHESIA RELEASE NOTE
Anesthesia Release from PACU Note    Patient: Blayne Chavis    Procedure(s) Performed: Procedure(s) (LRB):  ORIF, FRACTURE, FEMUR, INTERTROCHANTERIC - PP around EVERETT (Right)    Anesthesia type: general and regional    Post pain: Adequate analgesia    Post assessment: no apparent anesthetic complications, tolerated procedure well, and no evidence of recall    Last Vitals: Visit Vitals  /65   Pulse 79   Temp 36.4 °C (97.5 °F) (Temporal)   Resp 11   Wt 97.5 kg (215 lb)   SpO2 98%   BMI 29.99 kg/m²       Post vital signs: stable    Level of consciousness: awake, alert , and oriented    Nausea/Vomiting: no nausea/no vomiting    Complications: none    Airway Patency: patent    Respiratory: unassisted, spontaneous ventilation    Cardiovascular: stable and blood pressure at baseline    Hydration: euvolemic

## 2024-04-11 NOTE — ASSESSMENT & PLAN NOTE
Blayne Chavis is a 73 y.o. male w/PMH of R-EVERETT (2014, Dr. Sanders), T2DM, YARELI, GERD, prior CVA with residual right-sided weakness, presenting as transfer with a right vancouver B1 periprosthetic proximal femur fracture. Closed, neurovascularly intact, and without any other musculoskeletal injuries on physical exam.      --Admit to medicine service for pre-operative optimization and medical evaluation.  --Pain control per primary  --DVT PPx: hold chemical, FCDs at all times when not ambulating  --Bed rest, dougherty, NPO  --To OR today

## 2024-04-11 NOTE — ASSESSMENT & PLAN NOTE
[facility] with periprosthetic comminuted fracture of the intertrochanteric and greater trochanteric region of the right femur in patient with previous total hip arthroplasty in 2014.   Orthopedics evaluated and appreciate recs and plan ORIF of right proximal femur to repair fracture, OR on 4/11 for repair  Patient started on multimodal pain medications with scheduled Tylenol, Robaxin and Lyrica with Oxycodone OR po prn for breakthrough pain and will continue as pain controlled at present.   Heparin 5000 units subcutaneous TID for DVT prophylaxis pre-op.    Bed rest for now and non weight bearing to right lower extremity as per Ortho pre-op.  Anesthesia management pain post op with PNC  PT/OT consulted

## 2024-04-11 NOTE — TRANSFER OF CARE
Anesthesia Transfer of Care Note    Patient: Blayne Chavis    Procedure(s) Performed: Procedure(s) (LRB):  ORIF, FRACTURE, FEMUR, INTERTROCHANTERIC - PP around EVERETT (Right)    Patient location: PACU    Anesthesia Type: general    Transport from OR: Transported from OR on 6-10 L/min O2 by face mask with adequate spontaneous ventilation    Post pain: adequate analgesia    Post assessment: no apparent anesthetic complications and tolerated procedure well    Post vital signs: stable    Level of consciousness: sedated    Nausea/Vomiting: no nausea/vomiting    Complications: none    Transfer of care protocol was followed    Last vitals: Visit Vitals  BP (!) 156/74 (BP Location: Left arm)   Pulse 91   Temp 37.2 °C (99 °F) (Oral)   Resp 16   Wt 97.5 kg (215 lb)   SpO2 (!) 92%   BMI 29.99 kg/m²

## 2024-04-11 NOTE — NURSING TRANSFER
Nursing Transfer Note      4/11/2024   12:58 PM    Nurse giving handoff:STACIE white  Nurse receiving handoff:stacie de dios     Reason patient is being transferred: post op return to room     Transfer To: return to room 544    Transfer via bed    Transfer with cardiac monitoring, 2L NC    Transported by transport    Transfer Vital Signs:  Blood Pressure:123/67  Heart Rate:80  O2:99  Respirations:8    Telemetry: Box Number [id number], Rate 79, and Rhythm NSR  Order for Tele Monitor? Yes    Additional Lines: Oxygen and Diaz Catheter    Medicines sent: ropivacaine perineural     Any special needs or follow-up needed: routine    Chart send with patient: Yes    Notified: spouse    Patient reassessed at: 1244

## 2024-04-11 NOTE — ANESTHESIA PROCEDURE NOTES
[facility]    Patient location during procedure: pre-op   Block not for primary anesthetic.  Reason for block: at surgeon's request and post-op pain management   Post-op Pain Location: R hip pain   Start time: 4/11/2024 6:45 AM  Timeout: 4/11/2024 6:45 AM   End time: 4/11/2024 6:55 AM    Staffing  Authorizing Provider: Millie Kraus MD  Performing Provider: Reuben Kidd MD    Staffing  Performed by: Reuben Kidd MD  Authorized by: Millie Kraus MD    Preanesthetic Checklist  Completed: patient identified, IV checked, site marked, risks and benefits discussed, surgical consent, monitors and equipment checked, pre-op evaluation and timeout performed  Peripheral Block  Patient position: supine  Prep: ChloraPrep and site prepped and draped  Patient monitoring: heart rate, cardiac monitor, continuous pulse ox, continuous capnometry and frequent blood pressure checks  Block type: fascia iliaca  Laterality: right  Injection technique: continuous  Needle  Needle type: Tuohy   Needle gauge: 17 G  Needle length: 3.5 in  Needle localization: anatomical landmarks and ultrasound guidance  Catheter type: spring wound  Catheter size: 19 G  Test dose: lidocaine 1.5% with Epi 1-to-200,000 and negative   -ultrasound image captured on disc.  Assessment  Injection assessment: negative aspiration, negative parasthesia and local visualized surrounding nerve  Paresthesia pain: none  Heart rate change: no  Slow fractionated injection: yes  Pain Tolerance: comfortable throughout block and no complaints  Medications:    Medications: ropivacaine (NAROPIN) injection 0.5% - Perineural   20 mL - 4/11/2024 6:55:00 AM    Additional Notes  VSS.  [facility] RN monitoring vitals throughout procedure.  Patient tolerated procedure well.

## 2024-04-11 NOTE — ANESTHESIA POSTPROCEDURE EVALUATION
Anesthesia Post Evaluation    Patient: Blayne Chavis    Procedure(s) Performed: Procedure(s) (LRB):  ORIF, FRACTURE, FEMUR, INTERTROCHANTERIC - PP around EVERETT (Right)    Final Anesthesia Type: general      Patient location during evaluation: PACU  Patient participation: Yes- Able to Participate  Level of consciousness: awake  Post-procedure vital signs: reviewed and stable  Airway patency: patent    PONV status at discharge: No PONV  Anesthetic complications: no      Cardiovascular status: blood pressure returned to baseline and stable  Respiratory status: spontaneous ventilation and nasal cannula  Hydration status: euvolemic  Follow-up not needed.              Vitals Value Taken Time   /69 04/11/24 1416   Temp 36.4 °C (97.5 °F) 04/11/24 1145   Pulse 79 04/11/24 1422   Resp 17 04/11/24 1416   SpO2 99 % 04/11/24 1422   Vitals shown include unvalidated device data.      Event Time   Out of Recovery 04/11/2024 13:30:00         Pain/Lorena Score: Pain Rating Prior to Med Admin: 6 (4/11/2024 12:35 PM)  Pain Rating Post Med Admin: 0 (4/10/2024  6:18 AM)  Lorena Score: 8 (4/11/2024  1:15 PM)

## 2024-04-11 NOTE — PROGRESS NOTES
[facility] - [facility] ([facility])  Orthopedics  Progress Note    Patient Name: Blayne Chavis  MRN: [id number]  Admission Date: 4/9/2024  Hospital Length of Stay: 2 days  Attending Provider: Angela Tripathi MD  Primary Care Provider: Jon Baca MD  Follow-up For: Procedure(s) (LRB):  REVISION, TOTAL ARTHROPLASTY, HIP vs ORIF FEMUR - YUDELKA (Right)    Post-Operative Day: Day of Surgery  Subjective:     Principal Problem:Periprosthetic fracture around internal prosthetic hip joint    Principal Orthopedic Problem: same as above    Interval History: Patient seen and examined at bedside. TAM. NPO in anticipation of OR today      Review of patient's allergies indicates:   Allergen Reactions    Sulfa (sulfonamide antibiotics) Other (See Comments)       Current Facility-Administered Medications   Medication    0.9%  NaCl infusion (for blood administration)    acetaminophen tablet 1,000 mg    aluminum-magnesium hydroxide-simethicone 200-200-20 mg/5 mL suspension 30 mL    amLODIPine tablet 10 mg    atorvastatin tablet 40 mg    bisacodyL suppository 10 mg    ceFAZolin 2 g in dextrose 5 % in water (D5W) 50 mL IVPB (MB+)    dextrose 10% bolus 125 mL 125 mL    dextrose 10% bolus 250 mL 250 mL    glucagon (human recombinant) injection 1 mg    glucose chewable tablet 16 g    glucose chewable tablet 24 g    glycerin adult suppository 1 suppository    insulin aspart U-100 pen 0-5 Units    LIDOcaine (PF) 10 mg/ml (1%) injection 10 mg    melatonin tablet 6 mg    methocarbamoL tablet 500 mg    mupirocin 2 % ointment 1 g    mupirocin 2 % ointment    mupirocin 2 % ointment    naloxone 0.4 mg/mL injection 0.02 mg    ondansetron injection 4 mg    oxyCODONE immediate release tablet 5 mg    oxyCODONE immediate release tablet Tab 10 mg    polyethylene glycol packet 17 g    pregabalin capsule 75 mg    senna-docusate 8.6-50 mg per tablet 1 tablet    simethicone chewable tablet 80 mg    sodium chloride 0.9% flush 1-10 mL    sodium  chloride 0.9% flush 10 mL    sodium chloride 0.9% flush 10 mL    tamsulosin 24 hr capsule 0.4 mg    tranexamic acid (CYKLOKAPRON) 1,000 mg in sodium chloride 0.9 % 100 mL IVPB (MB+)    tranexamic acid (CYKLOKAPRON) 1,000 mg in sodium chloride 0.9 % 100 mL IVPB (MB+)    tranexamic acid (CYKLOKAPRON) 3,000 mg in sodium chloride 0.9% SolP 100 mL    vancomycin (VANCOCIN) 1,000 mg in dextrose 5 % (D5W) 250 mL IVPB (Vial-Mate)     Objective:     Vital Signs (Most Recent):  Temp: 99 °F (37.2 °C) (04/11/24 0548)  Pulse: 87 (04/11/24 0548)  Resp: 18 (04/11/24 0548)  BP: (!) 164/80 (04/11/24 0548)  SpO2: (!) 94 % (04/11/24 0548) Vital Signs (24h Range):  Temp:  [96.8 °F (36 °C)-99.1 °F (37.3 °C)] 99 °F (37.2 °C)  Pulse:  [] 87  Resp:  [16-20] 18  SpO2:  [87 %-94 %] 94 %  BP: (109-164)/(59-80) 164/80     Weight: 97.5 kg (215 lb)     Body mass index is 29.99 kg/m².      Intake/Output Summary (Last 24 hours) at 4/11/2024 0611  Last data filed at 4/10/2024 2100  Gross per 24 hour   Intake --   Output 1750 ml   Net -1750 ml        Ortho/SPM Exam  General:  no apparent distress, WDWN  HENT:  NCAT, Bilateral ears/eyes normal  CV:  Normal pulses, color, and cap refill  Lungs:  Normal respiratory effort  Neuro: No FND, awake, alert  Psych:  Oriented to Person, Place, Time, and Situation     MSK:          BUE:  Inspection: Skin intact throughout, no swelling, no effusions, no ecchymosis   Palpation: Non-TTP throughout, no palpable abnormality.   ROM: AROM and PROM of the shoulder, elbow, wrist, and hand intact without pain.   Neuro: AIN/PIN/Radial/Median/Ulnar Nerves assessed in isolation without deficit.   SILT throughout.    Vascular: Radial artery palpated 2+. Capillary refill <3s.           LLE:  Inspection: Skin intact throughout, no swelling, no effusions, no ecchymosis   Palpation: Non-TTP throughout. No palpable abnormality.   ROM: AROM and PROM of the hip, knee, ankle, and foot intact without pain.   Neuro:  "TA/EHL/Gastroc/FHL assessed in isolation without deficit.   SILT throughout.    Vascular: Foot is WWP. Capillary refill <3s.           RLE:  Inspection: Skin intact throughout, no open wounds  Swelling present at proximal thigh   Palpation: TTP at proximal thigh; otherwise non-TTP throughout.  Compartments are soft and easily compressible    ROM: AROM and PROM of the knee, ankle, foot intact without pain.  Assessment of hip ROM deferred 2/2 known fracture    Neuro: TA/Gastroc/EHL/FHL assessed in isolation without deficit.   SILT Sa/Block/DP/SP/T nerve distributions   Vascular: DP and PT arteries palpated 2+. Capillary refill <3s.        Spine/pelvis/axial body:  No tenderness to palpation of cervical, thoracic, or lumbar spine  Stable and without pain with direct anterior pressure over ASIS.  No chest wall or abdominal tenderness  No decubitus ulcers  Muscle tone normal      Significant Labs: CBC:   Recent Labs   Lab 04/10/24  0419   WBC 5.91   HGB 12.4*   HCT 38.0*        CMP:   Recent Labs   Lab 04/10/24  0420      K 4.1      CO2 19*   *   BUN 28*   CREATININE 0.9   CALCIUM 8.6*   ANIONGAP 10     Coagulation: No results for input(s): "LABPROT", "INR", "APTT" in the last 48 hours.  All pertinent labs within the past 24 hours have been reviewed.    Significant Imaging: I have reviewed all pertinent imaging results/findings.  Assessment/Plan:     * Periprosthetic fracture around internal prosthetic left hip joint  Blayne Chavis is a 73 y.o. male w/PMH of R-EVERETT (2014, Dr. Sanders), T2DM, YARELI, GERD, prior CVA with residual right-sided weakness, presenting as transfer with a right vancouver B1 periprosthetic proximal femur fracture. Closed, neurovascularly intact, and without any other musculoskeletal injuries on physical exam.      --Admit to medicine service for pre-operative optimization and medical evaluation.  --Pain control per primary  --DVT PPx: hold chemical, FCDs at all times when " not ambulating  --Bed rest, farhat, NPO  --To OR today          Ford Salinas MD  Orthopedics  [facility] - Surgery (2nd Fl)

## 2024-04-11 NOTE — SUBJECTIVE & OBJECTIVE
Principal Problem:Periprosthetic fracture around internal prosthetic hip joint    Principal Orthopedic Problem: same as above    Interval History: Patient seen and examined at bedside. SYED DALY in anticipation of OR today      Review of patient's allergies indicates:   Allergen Reactions    Sulfa (sulfonamide antibiotics) Other (See Comments)       Current Facility-Administered Medications   Medication    0.9%  NaCl infusion (for blood administration)    acetaminophen tablet 1,000 mg    aluminum-magnesium hydroxide-simethicone 200-200-20 mg/5 mL suspension 30 mL    amLODIPine tablet 10 mg    atorvastatin tablet 40 mg    bisacodyL suppository 10 mg    ceFAZolin 2 g in dextrose 5 % in water (D5W) 50 mL IVPB (MB+)    dextrose 10% bolus 125 mL 125 mL    dextrose 10% bolus 250 mL 250 mL    glucagon (human recombinant) injection 1 mg    glucose chewable tablet 16 g    glucose chewable tablet 24 g    glycerin adult suppository 1 suppository    insulin aspart U-100 pen 0-5 Units    LIDOcaine (PF) 10 mg/ml (1%) injection 10 mg    melatonin tablet 6 mg    methocarbamoL tablet 500 mg    mupirocin 2 % ointment 1 g    mupirocin 2 % ointment    mupirocin 2 % ointment    naloxone 0.4 mg/mL injection 0.02 mg    ondansetron injection 4 mg    oxyCODONE immediate release tablet 5 mg    oxyCODONE immediate release tablet Tab 10 mg    polyethylene glycol packet 17 g    pregabalin capsule 75 mg    senna-docusate 8.6-50 mg per tablet 1 tablet    simethicone chewable tablet 80 mg    sodium chloride 0.9% flush 1-10 mL    sodium chloride 0.9% flush 10 mL    sodium chloride 0.9% flush 10 mL    tamsulosin 24 hr capsule 0.4 mg    tranexamic acid (CYKLOKAPRON) 1,000 mg in sodium chloride 0.9 % 100 mL IVPB (MB+)    tranexamic acid (CYKLOKAPRON) 1,000 mg in sodium chloride 0.9 % 100 mL IVPB (MB+)    tranexamic acid (CYKLOKAPRON) 3,000 mg in sodium chloride 0.9% SolP 100 mL    vancomycin (VANCOCIN) 1,000 mg in dextrose 5 % (D5W) 250 mL IVPB  (Vial-Mate)     Objective:     Vital Signs (Most Recent):  Temp: 99 °F (37.2 °C) (04/11/24 0548)  Pulse: 87 (04/11/24 0548)  Resp: 18 (04/11/24 0548)  BP: (!) 164/80 (04/11/24 0548)  SpO2: (!) 94 % (04/11/24 0548) Vital Signs (24h Range):  Temp:  [96.8 °F (36 °C)-99.1 °F (37.3 °C)] 99 °F (37.2 °C)  Pulse:  [] 87  Resp:  [16-20] 18  SpO2:  [87 %-94 %] 94 %  BP: (109-164)/(59-80) 164/80     Weight: 97.5 kg (215 lb)     Body mass index is 29.99 kg/m².      Intake/Output Summary (Last 24 hours) at 4/11/2024 0611  Last data filed at 4/10/2024 2100  Gross per 24 hour   Intake --   Output 1750 ml   Net -1750 ml        Ortho/SPM Exam  General:  no apparent distress, WDWN  HENT:  NCAT, Bilateral ears/eyes normal  CV:  Normal pulses, color, and cap refill  Lungs:  Normal respiratory effort  Neuro: No FND, awake, alert  Psych:  Oriented to Person, Place, Time, and Situation     MSK:          BUE:  Inspection: Skin intact throughout, no swelling, no effusions, no ecchymosis   Palpation: Non-TTP throughout, no palpable abnormality.   ROM: AROM and PROM of the shoulder, elbow, wrist, and hand intact without pain.   Neuro: AIN/PIN/Radial/Median/Ulnar Nerves assessed in isolation without deficit.   SILT throughout.    Vascular: Radial artery palpated 2+. Capillary refill <3s.           LLE:  Inspection: Skin intact throughout, no swelling, no effusions, no ecchymosis   Palpation: Non-TTP throughout. No palpable abnormality.   ROM: AROM and PROM of the hip, knee, ankle, and foot intact without pain.   Neuro: TA/EHL/Gastroc/FHL assessed in isolation without deficit.   SILT throughout.    Vascular: Foot is WWP. Capillary refill <3s.           RLE:  Inspection: Skin intact throughout, no open wounds  Swelling present at proximal thigh   Palpation: TTP at proximal thigh; otherwise non-TTP throughout.  Compartments are soft and easily compressible    ROM: AROM and PROM of the knee, ankle, foot intact without pain.  Assessment of  "hip ROM deferred 2/2 known fracture    Neuro: TA/Gastroc/EHL/FHL assessed in isolation without deficit.   SILT Sa/Block/DP/SP/T nerve distributions   Vascular: DP and PT arteries palpated 2+. Capillary refill <3s.        Spine/pelvis/axial body:  No tenderness to palpation of cervical, thoracic, or lumbar spine  Stable and without pain with direct anterior pressure over ASIS.  No chest wall or abdominal tenderness  No decubitus ulcers  Muscle tone normal      Significant Labs: CBC:   Recent Labs   Lab 04/10/24  0419   WBC 5.91   HGB 12.4*   HCT 38.0*        CMP:   Recent Labs   Lab 04/10/24  0420      K 4.1      CO2 19*   *   BUN 28*   CREATININE 0.9   CALCIUM 8.6*   ANIONGAP 10     Coagulation: No results for input(s): "LABPROT", "INR", "APTT" in the last 48 hours.  All pertinent labs within the past 24 hours have been reviewed.    Significant Imaging: I have reviewed all pertinent imaging results/findings.  "

## 2024-04-11 NOTE — ASSESSMENT & PLAN NOTE
Chronic, controlled. Latest blood pressure and vitals reviewed-     Temp:  [96.8 °F (36 °C)-99 °F (37.2 °C)]   Pulse:  [74-95]   Resp:  [11-21]   BP: (111-164)/(59-80)   SpO2:  [90 %-99 %] .   Home meds for hypertension were reviewed and noted below.   Hypertension Medications               amLODIPine (NORVASC) 10 MG tablet Take 1 tablet (10 mg total) by mouth once daily.    losartan (COZAAR) 25 MG tablet Take 1 tablet (25 mg total) by mouth once daily.            While in the hospital, will manage blood pressure as follows; Adjust home antihypertensive regimen as follows- Continue home Norvasc to treat HTN but hold Losartan for surgery and resume post-op as BP tolerates.    Will utilize p.r.n. blood pressure medication only if patient's blood pressure greater than 180/110 and he develops symptoms such as worsening chest pain or shortness of breath.

## 2024-04-11 NOTE — PROGRESS NOTES
Nurses Note -- 4 Eyes      4/11/2024   6:46 AM      Skin assessed during: Q Shift Change      [x] No Altered Skin Integrity Present    []Prevention Measures Documented      [] Yes- Altered Skin Integrity Present or Discovered   [] LDA Added if Not in Epic (Describe Wound)   [] New Altered Skin Integrity was Present on Admit and Documented in LDA   [] Wound Image Taken    Wound Care Consulted? No    Attending Nurse:  STACIE Barr    Second RN/Staff Member:   Fabiana

## 2024-04-11 NOTE — PROGRESS NOTES
[facility]  Progress Note    Patient Name: Blayne Chavis  MRN: [id number]  Patient Class: IP- Inpatient   Admission Date: 4/9/2024  Length of Stay: 2 days  Attending Physician: Jimbo Dixon MD  Primary Care Provider: Jon Baca MD        Subjective:     Principal Problem:Periprosthetic fracture around internal prosthetic hip joint        HPI:  Blayne Chavis is a 73 y.o. male with history of CVA with residual mild right-sided weakness, history of R hip arthroplasty, HTN, HLD [facility].     He reports that when walking today, he stubbed his toe, causing him to fall to the ground.  He immediately noted right hip pain, however he thought this was due to just a strain, and continue to walk.  However, the pain did not resolve.  [facility].  He denies any presyncopal symptoms or loss of consciousness.  At baseline, he has mild residual right-sided weakness from his prior CVA but is able to ambulate without any assistive devices.    Reportedly XR showed R proximal trochanteric fracture with displacement and comminuted component. For this reason, he was transferred here due to need for Orthopedics.     Overview/Hospital Course:  [facility] with periprosthetic comminuted fracture of the intertrochanteric and greater trochanteric region of the right femur in patient with previous total hip arthroplasty in 2014. Orthopedics evauated and plan ORIF of right proximal femur to repair fracture. Patient started on multimodal pain medciations with scheduled Tylenol, Robaxin and Lyrica with Oxycodone IR po prn for breakthrough pain. Patient likely to go to OR on 4/11 for operative repair so started on Heparin 5000 units subcutaneous TID for DVT prophylaxis.     Interval History:   No events overnight. OR today with Ortho. PNC post op with mild pain.      Review of Systems    Constitutional:  Negative for fever.   Respiratory:  Negative for cough and shortness of breath.    Cardiovascular:  Negative for chest pain.   Gastrointestinal:  Negative for abdominal pain, constipation and nausea.   Musculoskeletal:  Positive for arthralgias (Right hip).   Neurological:  Negative for dizziness.   Psychiatric/Behavioral:  Negative for agitation and confusion.      Objective:     Vital Signs (Most Recent):  Temp: 97.8 °F (36.6 °C) (04/11/24 1509)  Pulse: 76 (04/11/24 1519)  Resp: 17 (04/11/24 1509)  BP: 117/67 (04/11/24 1509)  SpO2: (!) 92 % (04/11/24 1509) Vital Signs (24h Range):  Temp:  [96.8 °F (36 °C)-99 °F (37.2 °C)] 97.8 °F (36.6 °C)  Pulse:  [74-95] 76  Resp:  [11-21] 17  SpO2:  [90 %-99 %] 92 %  BP: (111-164)/(59-80) 117/67     Weight: 97.5 kg (215 lb)  Body mass index is 29.99 kg/m².    Intake/Output Summary (Last 24 hours) at 4/11/2024 1630  Last data filed at 4/11/2024 1402  Gross per 24 hour   Intake 2500 ml   Output 1495 ml   Net 1005 ml         Physical Exam  Vitals and nursing note reviewed.   Constitutional:       General: He is awake. He is not in acute distress.     Appearance: Normal appearance. He is well-developed and normal weight. He is not ill-appearing.   Eyes:      Conjunctiva/sclera: Conjunctivae normal.   Cardiovascular:      Rate and Rhythm: Normal rate and regular rhythm.      Heart sounds: Normal heart sounds. No murmur heard.  Pulmonary:      Effort: Pulmonary effort is normal. No respiratory distress.      Breath sounds: Normal breath sounds. No wheezing.   Abdominal:      General: Abdomen is flat. Bowel sounds are normal. There is no distension.      Palpations: Abdomen is soft.      Tenderness: There is no abdominal tenderness.      Comments: PNC   Musculoskeletal:      Right lower leg: No edema.      Left lower leg: No edema.   Skin:     Findings: No erythema.   Neurological:      Mental Status: He is alert and oriented to person, place, and time.    Psychiatric:         Mood and Affect: Mood normal.         Behavior: Behavior normal. Behavior is cooperative.         Thought Content: Thought content normal.         Judgment: Judgment normal.             Significant Labs: All pertinent labs within the past 24 hours have been reviewed.  CBC:   Recent Labs   Lab 04/10/24  0419 04/11/24  1145 04/11/24  1553   WBC 5.91 4.89 4.79   HGB 12.4* 11.3* 11.4*   HCT 38.0* 34.1* 34.6*    130* 146*     CMP:   Recent Labs   Lab 04/10/24  0420      K 4.1      CO2 19*   *   BUN 28*   CREATININE 0.9   CALCIUM 8.6*   ANIONGAP 10       Significant Imaging: I have reviewed all pertinent imaging results/findings within the past 24 hours.    Assessment/Plan:      * Periprosthetic fracture around internal prosthetic right hip joint  [facility] with periprosthetic comminuted fracture of the intertrochanteric and greater trochanteric region of the right femur in patient with previous total hip arthroplasty in 2014.   Orthopedics evaluated and appreciate recs and plan ORIF of right proximal femur to repair fracture, OR on 4/11 for repair  Patient started on multimodal pain medications with scheduled Tylenol, Robaxin and Lyrica with Oxycodone OR po prn for breakthrough pain and will continue as pain controlled at present.   Heparin 5000 units subcutaneous TID for DVT prophylaxis pre-op.    Bed rest for now and non weight bearing to right lower extremity as per Ortho pre-op.  Anesthesia management pain post op with PNC  PT/OT consulted    Slow transit constipation  Patient noted on CT scan of pelvis on admit to have moderate fecal impaction. Patient started on scheduled Senakot 1 tablet po BID + Miralax 17 grams po daily to treat with Doculax suppository prn.       DNR (do not resuscitate)  Advance Care Planning  I discussed code status with patient on admission. he states that he would never want CPR or  intubation/mechanical ventilation in the event of cardiopulmonary arrest, in agreement with DNR status. His wife is his surrogate decision maker, and he reports that he has discussed this with her.        Controlled type 2 diabetes mellitus without complication, without long-term current use of insulin  Patient's FSGs are controlled on current medication regimen. Patient on no meds at home. No recent HgA1C so will check one on this admit.  Last A1c reviewed-   Lab Results   Component Value Date    HGBA1C 5.7 06/30/2010     Current correctional scale  Low  Start anti-hyperglycemic dose as follows-   Antihyperglycemics (From admission, onward)      Start     Stop Route Frequency Ordered    04/10/24 1805  insulin aspart U-100 pen 0-5 Units         -- SubQ Before meals & nightly PRN 04/10/24 1706          Hold Oral hypoglycemics while patient is in the hospital.  Monitor blood sugars with meals and at bedtime in hospital.  Target blood sugars 140-180 in hospital.     Preoperative examination  Patient undergoing non-emergent non-cardiac surgery.  Patient does not have active cardiac problems   -baseline EKG ordered; to be followed up.   -reports chronic murmur which is demonstrated on exam without signs of decompensated CHF.   -no history of known CAD, LHC, or stents.   Patient undergoing intermediate risk surgery.  Functional Status: Patient has functional METs > or = 4  Revised cardiac risk index (RCRI) score is 1.         Other Issues:       Patient on long term anticoagulation: No      Recent coronary stenting: No    Patient with acceptable cardiac risk with (RCRI score of 1) going for intermediate risk surgery. No absolute contraindications to the proposed surgery at this time. intermediate risk of post-op pulmonary complications.  intermediate risk of post-op delirium.   - Okay to proceed with planned surgery with no additional cardiac testing needed prior to surgery.    History of stroke  Chronic and controlled.  Continue home Lipitor to treat. Hold Aspirin for surgery and resume post-op.       Pure hypercholesterolemia  Chronic and controlled. Continue home Lipitor po daily to treat.        Primary hypertension  Chronic, controlled. Latest blood pressure and vitals reviewed-     Temp:  [96.8 °F (36 °C)-99 °F (37.2 °C)]   Pulse:  [74-95]   Resp:  [11-21]   BP: (111-164)/(59-80)   SpO2:  [90 %-99 %] .   Home meds for hypertension were reviewed and noted below.   Hypertension Medications               amLODIPine (NORVASC) 10 MG tablet Take 1 tablet (10 mg total) by mouth once daily.    losartan (COZAAR) 25 MG tablet Take 1 tablet (25 mg total) by mouth once daily.            While in the hospital, will manage blood pressure as follows; Adjust home antihypertensive regimen as follows- Continue home Norvasc to treat HTN but hold Losartan for surgery and resume post-op as BP tolerates.    Will utilize p.r.n. blood pressure medication only if patient's blood pressure greater than 180/110 and he develops symptoms such as worsening chest pain or shortness of breath.      VTE Risk Mitigation (From admission, onward)           Ordered     apixaban tablet 2.5 mg  2 times daily         04/11/24 1049     Reason for no Mechanical VTE Prophylaxis  Once        Question:  Reasons:  Answer:  Risk of Bleeding    04/09/24 2115     IP VTE HIGH RISK PATIENT  Once         04/09/24 2115     Reason for No Pharmacological VTE Prophylaxis  Once        Question:  Reasons:  Answer:  Risk of Bleeding    04/09/24 2115     Place sequential compression device  Until discontinued         04/09/24 2113                    Discharge Planning   BHUPENDRA: 4/15/2024     Code Status: DNR   Is the patient medically ready for discharge?: No    Reason for patient still in hospital (select all that apply): Patient trending condition, Laboratory test, Treatment, Consult recommendations, PT / OT recommendations, and Pending disposition                     Jimbo Dixon,  MD  Department of Hospital Medicine   [facility]

## 2024-04-11 NOTE — SUBJECTIVE & OBJECTIVE
Interval History:   No events overnight. OR today with Ortho. PNC post op with mild pain.      Review of Systems   Constitutional:  Negative for fever.   Respiratory:  Negative for cough and shortness of breath.    Cardiovascular:  Negative for chest pain.   Gastrointestinal:  Negative for abdominal pain, constipation and nausea.   Musculoskeletal:  Positive for arthralgias (Right hip).   Neurological:  Negative for dizziness.   Psychiatric/Behavioral:  Negative for agitation and confusion.      Objective:     Vital Signs (Most Recent):  Temp: 97.8 °F (36.6 °C) (04/11/24 1509)  Pulse: 76 (04/11/24 1519)  Resp: 17 (04/11/24 1509)  BP: 117/67 (04/11/24 1509)  SpO2: (!) 92 % (04/11/24 1509) Vital Signs (24h Range):  Temp:  [96.8 °F (36 °C)-99 °F (37.2 °C)] 97.8 °F (36.6 °C)  Pulse:  [74-95] 76  Resp:  [11-21] 17  SpO2:  [90 %-99 %] 92 %  BP: (111-164)/(59-80) 117/67     Weight: 97.5 kg (215 lb)  Body mass index is 29.99 kg/m².    Intake/Output Summary (Last 24 hours) at 4/11/2024 1630  Last data filed at 4/11/2024 1402  Gross per 24 hour   Intake 2500 ml   Output 1495 ml   Net 1005 ml         Physical Exam  Vitals and nursing note reviewed.   Constitutional:       General: He is awake. He is not in acute distress.     Appearance: Normal appearance. He is well-developed and normal weight. He is not ill-appearing.   Eyes:      Conjunctiva/sclera: Conjunctivae normal.   Cardiovascular:      Rate and Rhythm: Normal rate and regular rhythm.      Heart sounds: Normal heart sounds. No murmur heard.  Pulmonary:      Effort: Pulmonary effort is normal. No respiratory distress.      Breath sounds: Normal breath sounds. No wheezing.   Abdominal:      General: Abdomen is flat. Bowel sounds are normal. There is no distension.      Palpations: Abdomen is soft.      Tenderness: There is no abdominal tenderness.      Comments: PNC   Musculoskeletal:      Right lower leg: No edema.      Left lower leg: No edema.   Skin:     Findings:  No erythema.   Neurological:      Mental Status: He is alert and oriented to person, place, and time.   Psychiatric:         Mood and Affect: Mood normal.         Behavior: Behavior normal. Behavior is cooperative.         Thought Content: Thought content normal.         Judgment: Judgment normal.             Significant Labs: All pertinent labs within the past 24 hours have been reviewed.  CBC:   Recent Labs   Lab 04/10/24  0419 04/11/24  1145 04/11/24  1553   WBC 5.91 4.89 4.79   HGB 12.4* 11.3* 11.4*   HCT 38.0* 34.1* 34.6*    130* 146*     CMP:   Recent Labs   Lab 04/10/24  0420      K 4.1      CO2 19*   *   BUN 28*   CREATININE 0.9   CALCIUM 8.6*   ANIONGAP 10       Significant Imaging: I have reviewed all pertinent imaging results/findings within the past 24 hours.

## 2024-04-11 NOTE — PROGRESS NOTES
Preop complete. Consents confirmed. Patient wife Ratna called twice on phone with no answer and looked for in [facility] and was not there. Sent wife text message to know where to come on arrival. Preop Lyrica given last night at 2100. Few MD with ortho gave OK to skip preop dose.

## 2024-04-11 NOTE — ANESTHESIA PROCEDURE NOTES
Intubation    Date/Time: 4/11/2024 7:23 AM    Performed by: uJan Luis Petit CRNA  Authorized by: Leopold, Rhonda G, MD    Intubation:     Induction:  Intravenous    Intubated:  Postinduction    Mask Ventilation:  Easy with oral airway    Attempts:  1    Attempted By:  CRNA    Method of Intubation:  Video laryngoscopy    Blade:  Doe 3    Laryngeal View Grade: Grade I - full view of cords      Difficult Airway Encountered?: No      Complications:  None    Airway Device:  Oral endotracheal tube    Airway Device Size:  7.5    Style/Cuff Inflation:  Cuffed (inflated to minimal occlusive pressure)    Inflation Amount (mL):  8    Tube secured:  23    Secured at:  The lips    Placement Verified By:  Capnometry    Complicating Factors:  None    Findings Post-Intubation:  BS equal bilateral and atraumatic/condition of teeth unchanged

## 2024-04-11 NOTE — OP NOTE
OP NOTE    DOS:  04/11/2024    Preop Dx: Right Martha B1 periprosthetic intertrochanteric femur fracture around total hip arthroplasty    Postop Dx: Right Martha B1 periprosthetic intertrochanteric femur fracture around total hip arthroplasty    Procedure: Open reduction internal fixation right periprosthetic intertrochanteric femur fracture - 38611.22    Surgeon: Juan Luis Sanders M.D.    Asst:  Ford Salinas M.D    Anesthesia: GETA    EBL:  200cc    IVF:  1500cc crystalloid    Implants: [employer] Miles trochanteric cable plate, 5 hole with 3 cables and cerclage #5 fiberwire    Specimens: None    Findings: Well fixed stem.  Comminuted fracture.  Good fixation    Dispo:  To PACU extubated/stable       Indications for Procedure:      73 year-old male 10 year status post right total hip arthroplasty for femoral neck fracture fell sustaining an intertrochanteric fracture of the right hip.  The stem has not subsided and looks like it is still well fixed in multiple parts.  The trochanteric region has been significantly displaced.  I am taking him to the operating room for open reduction internal fixation.  The risks, benefits and alternatives to surgery were discussed with the patient prior to going to the operating room.  Informed consent was obtained.    I am pending 0.22 modifier to this case given the fracture being around the total hip arthroplasty and the inability to use a plate screw construct in the extra time work involved in a hook plate and cable construct treatment.    Procedure in Detail:    Patient was identified in preoperative holding area and the site was marked.  Patient was treated with regional analgesia.  Patient was wheeled to the operating room and placed on the operating table in supine position.  General endotracheal anesthesia was induced and preoperative antibiotics were administered to include Ancef and vancomycin.  Patient was then placed into a lateral decubitus position with the  right hip up.  The right hip and lower extremity were prepped and draped in sterile fashion.  A time-out was undertaken to confirm patient, side, site, surgery, surgeon and administration of preoperative antibiotics and TXA.  All agreed we proceeded.      I went through his old posterior incision and extended this down the lateral thigh.  I developed full-thickness flaps anteriorly and posteriorly.  I incised the iliotibial band and gluteal fascia in line with the skin incision and placed a Charnley retractor.  I could see the fracture site through the intertrochanteric region and visualized part of the stem.  At this point the stem appeared to be well fixed to the remaining bone.    I split the vastus lateralis in its midportion down the femur.  At this point I used several different clamps and reduced the intertrochanteric region to the stem and placed several large K-wires to hold this in provisional position.  I had to place a large clamp around the subtrochanteric region to get a spike that was posterior to stay in position there.  I then selected a 5 hole [employer] cable plate.  I found the best alignment on the greater trochanter where this would aligned well with the shaft.  I bovied to little pads in the gluteus medius and placed the hooks through that and around the greater trochanter.  I then assessed where this would lie on the shaft of the bone.  I held this down with a tamp.    I used a cable Passer and passed 2 cables around the shaft more distally placing them through the plate in its cable holes.  I then passed the larger cable Passer from below the lesser trochanter back diagonally across and put a wire through the plate in a more proximal hole above the vastus ridge which then ran obliquely down around the lesser trochanter and back up through the plate again.     I began by provisionally tensioning the distal cable to hold the plate to bone and alignment was maintained in the center of  the shaft.  I then tensioned the proximal cable 1st to pull against the area below the lesser trochanter securing the long distal spike into position and compressing the fracture.  When I got this near its ultimate compression point I removed the K-wires.    I then went back and tensioned the more proximal shaft wire.  I then we tensioned the other 2 wires to ensure that they were tied this could be while maintaining fracture reduction.  After finally tensioning all the wires and using fluoroscopy to ensure that my plate had good placement and good reduction of the fracture, the wire slots in the plate were crimped and then the wires were cut.  I checked final position of the reduction and hardware under fluoroscopy and all was good.    I thoroughly irrigated the entire area with normal saline solution.  I then placed dilute Betadine and allowed this to sit for several minutes.  I again irrigated copiously with normal saline solution.  I placed a combination of vancomycin cefepime powder all the plate in the shaft and then closed the vastus lateralis with 0 Vicryl suture in a combination of interrupted and running fashion.  I then closed the iliotibial band and gluteal fascia over the remaining antibiotic powder with interrupted 1. Vicryl sutures.  The next layer fascia was closed 0 Vicryl suture, the subcutaneous tissues with 2-0 Vicryl suture and the skin with 3-0 strata fix and Dermabond.  A sterile silver lined dressing was then applied.      All instrument and sponge counts were reported correct at the end of the case.  There were no complications.  The patient was returned to a supine position, extubated, awakened and taken to the recovery room stable condition.      Plan the patient:    He will be toe-touch weight-bearing for 4 weeks in the all advance him to weight-bearing as tolerated.  X-rays of the right hip at all clinic follow-up visits.  Multimodal pain management limiting narcotics.  Anticoagulation  for DVT prophylaxis 4-6 weeks.    Juan Luis Sanders MD

## 2024-04-12 LAB
ANION GAP SERPL CALC-SCNC: 8 MMOL/L (ref 8–16)
BASOPHILS # BLD AUTO: 0 K/UL (ref 0–0.2)
BASOPHILS NFR BLD: 0 % (ref 0–1.9)
BUN SERPL-MCNC: 18 MG/DL (ref 8–23)
CALCIUM SERPL-MCNC: 8.2 MG/DL (ref 8.7–10.5)
CHLORIDE SERPL-SCNC: 107 MMOL/L (ref 95–110)
CO2 SERPL-SCNC: 21 MMOL/L (ref 23–29)
CREAT SERPL-MCNC: 0.7 MG/DL (ref 0.5–1.4)
DIFFERENTIAL METHOD BLD: ABNORMAL
EOSINOPHIL # BLD AUTO: 0 K/UL (ref 0–0.5)
EOSINOPHIL NFR BLD: 0 % (ref 0–8)
ERYTHROCYTE [DISTWIDTH] IN BLOOD BY AUTOMATED COUNT: 12.4 % (ref 11.5–14.5)
EST. GFR  (NO RACE VARIABLE): >60 ML/MIN/1.73 M^2
GLUCOSE SERPL-MCNC: 155 MG/DL (ref 70–110)
HCT VFR BLD AUTO: 31.1 % (ref 40–54)
HGB BLD-MCNC: 10.4 G/DL (ref 14–18)
IMM GRANULOCYTES # BLD AUTO: 0.03 K/UL (ref 0–0.04)
IMM GRANULOCYTES NFR BLD AUTO: 0.4 % (ref 0–0.5)
LYMPHOCYTES # BLD AUTO: 0.6 K/UL (ref 1–4.8)
LYMPHOCYTES NFR BLD: 7.3 % (ref 18–48)
MAGNESIUM SERPL-MCNC: 2.3 MG/DL (ref 1.6–2.6)
MCH RBC QN AUTO: 29.1 PG (ref 27–31)
MCHC RBC AUTO-ENTMCNC: 33.4 G/DL (ref 32–36)
MCV RBC AUTO: 87 FL (ref 82–98)
MONOCYTES # BLD AUTO: 0.9 K/UL (ref 0.3–1)
MONOCYTES NFR BLD: 10.7 % (ref 4–15)
NEUTROPHILS # BLD AUTO: 6.6 K/UL (ref 1.8–7.7)
NEUTROPHILS NFR BLD: 81.6 % (ref 38–73)
NRBC BLD-RTO: 0 /100 WBC
PHOSPHATE SERPL-MCNC: 2.6 MG/DL (ref 2.7–4.5)
PLATELET # BLD AUTO: 159 K/UL (ref 150–450)
PMV BLD AUTO: 10.3 FL (ref 9.2–12.9)
POCT GLUCOSE: 140 MG/DL (ref 70–110)
POCT GLUCOSE: 145 MG/DL (ref 70–110)
POCT GLUCOSE: 163 MG/DL (ref 70–110)
POCT GLUCOSE: 196 MG/DL (ref 70–110)
POTASSIUM SERPL-SCNC: 4.1 MMOL/L (ref 3.5–5.1)
RBC # BLD AUTO: 3.58 M/UL (ref 4.6–6.2)
SODIUM SERPL-SCNC: 136 MMOL/L (ref 136–145)
WBC # BLD AUTO: 8.05 K/UL (ref 3.9–12.7)

## 2024-04-12 PROCEDURE — 25000003 PHARM REV CODE 250: Performed by: STUDENT IN AN ORGANIZED HEALTH CARE EDUCATION/TRAINING PROGRAM

## 2024-04-12 PROCEDURE — 25000003 PHARM REV CODE 250

## 2024-04-12 PROCEDURE — 97162 PT EVAL MOD COMPLEX 30 MIN: CPT

## 2024-04-12 PROCEDURE — 21400001 HC TELEMETRY ROOM

## 2024-04-12 PROCEDURE — 36415 COLL VENOUS BLD VENIPUNCTURE: CPT | Performed by: STUDENT IN AN ORGANIZED HEALTH CARE EDUCATION/TRAINING PROGRAM

## 2024-04-12 PROCEDURE — 84100 ASSAY OF PHOSPHORUS: CPT | Performed by: STUDENT IN AN ORGANIZED HEALTH CARE EDUCATION/TRAINING PROGRAM

## 2024-04-12 PROCEDURE — 63600175 PHARM REV CODE 636 W HCPCS: Performed by: STUDENT IN AN ORGANIZED HEALTH CARE EDUCATION/TRAINING PROGRAM

## 2024-04-12 PROCEDURE — 80048 BASIC METABOLIC PNL TOTAL CA: CPT | Performed by: STUDENT IN AN ORGANIZED HEALTH CARE EDUCATION/TRAINING PROGRAM

## 2024-04-12 PROCEDURE — 85025 COMPLETE CBC W/AUTO DIFF WBC: CPT | Performed by: STUDENT IN AN ORGANIZED HEALTH CARE EDUCATION/TRAINING PROGRAM

## 2024-04-12 PROCEDURE — 99231 SBSQ HOSP IP/OBS SF/LOW 25: CPT | Mod: GC,,, | Performed by: SURGERY

## 2024-04-12 PROCEDURE — 83735 ASSAY OF MAGNESIUM: CPT | Performed by: STUDENT IN AN ORGANIZED HEALTH CARE EDUCATION/TRAINING PROGRAM

## 2024-04-12 PROCEDURE — 25000003 PHARM REV CODE 250: Performed by: INTERNAL MEDICINE

## 2024-04-12 RX ORDER — ACETAMINOPHEN 500 MG
1000 TABLET ORAL EVERY 8 HOURS
Status: DISCONTINUED | OUTPATIENT
Start: 2024-04-14 | End: 2024-04-14 | Stop reason: HOSPADM

## 2024-04-12 RX ORDER — OXYCODONE HYDROCHLORIDE 5 MG/1
5 TABLET ORAL EVERY 6 HOURS PRN
Status: DISCONTINUED | OUTPATIENT
Start: 2024-04-12 | End: 2024-04-13

## 2024-04-12 RX ADMIN — ACETAMINOPHEN 1000 MG: 500 TABLET ORAL at 12:04

## 2024-04-12 RX ADMIN — METHOCARBAMOL 500 MG: 500 TABLET ORAL at 06:04

## 2024-04-12 RX ADMIN — OXYCODONE 5 MG: 5 TABLET ORAL at 09:04

## 2024-04-12 RX ADMIN — METHOCARBAMOL 500 MG: 500 TABLET ORAL at 12:04

## 2024-04-12 RX ADMIN — AMLODIPINE BESYLATE 10 MG: 10 TABLET ORAL at 09:04

## 2024-04-12 RX ADMIN — MUPIROCIN: 20 OINTMENT TOPICAL at 09:04

## 2024-04-12 RX ADMIN — APIXABAN 2.5 MG: 2.5 TABLET, FILM COATED ORAL at 09:04

## 2024-04-12 RX ADMIN — DOCUSATE SODIUM AND SENNOSIDES 1 TABLET: 8.6; 5 TABLET, FILM COATED ORAL at 09:04

## 2024-04-12 RX ADMIN — CELECOXIB 200 MG: 200 CAPSULE ORAL at 09:04

## 2024-04-12 RX ADMIN — OXYCODONE 5 MG: 5 TABLET ORAL at 03:04

## 2024-04-12 RX ADMIN — POLYETHYLENE GLYCOL 3350 17 G: 17 POWDER, FOR SOLUTION ORAL at 09:04

## 2024-04-12 RX ADMIN — TAMSULOSIN HYDROCHLORIDE 0.4 MG: 0.4 CAPSULE ORAL at 09:04

## 2024-04-12 RX ADMIN — ROPIVACAINE HYDROCHLORIDE 0.1 ML/HR: 2 INJECTION, SOLUTION EPIDURAL; INFILTRATION at 10:04

## 2024-04-12 RX ADMIN — ATORVASTATIN CALCIUM 40 MG: 40 TABLET, FILM COATED ORAL at 09:04

## 2024-04-12 RX ADMIN — ACETAMINOPHEN 1000 MG: 500 TABLET ORAL at 06:04

## 2024-04-12 NOTE — ASSESSMENT & PLAN NOTE
[facility] with periprosthetic comminuted fracture of the intertrochanteric and greater trochanteric region of the right femur in patient with previous total hip arthroplasty in 2014.   Orthopedics evaluated and appreciate recs and plan ORIF of right proximal femur to repair fracture, OR on 4/11 for repair  Apixaban DVT prophylaxis post-op.    TTWB RLE post op  Anesthesia management pain post op with PNC and multi modals  PT/OT: High

## 2024-04-12 NOTE — SUBJECTIVE & OBJECTIVE
Interval History:   No events overnight. Worked with PT/OT today. Anaesthesia continuing PNC. Ortho following. Wife updated at bedside. Pain controlled.       Review of Systems   Constitutional:  Negative for fever.   Respiratory:  Negative for cough and shortness of breath.    Cardiovascular:  Negative for chest pain.   Gastrointestinal:  Negative for abdominal pain, constipation and nausea.   Musculoskeletal:  Positive for arthralgias (Right hip).   Neurological:  Negative for dizziness.   Psychiatric/Behavioral:  Negative for agitation and confusion.      Objective:     Vital Signs (Most Recent):  Temp: 97.6 °F (36.4 °C) (04/12/24 1208)  Pulse: 77 (04/12/24 1208)  Resp: 18 (04/12/24 1208)  BP: 127/72 (04/12/24 1208)  SpO2: (!) 93 % (04/12/24 1208) Vital Signs (24h Range):  Temp:  [97.6 °F (36.4 °C)-98 °F (36.7 °C)] 97.6 °F (36.4 °C)  Pulse:  [69-89] 77  Resp:  [17-20] 18  SpO2:  [92 %-94 %] 93 %  BP: (117-139)/(65-72) 127/72     Weight: 97.5 kg (215 lb)  Body mass index is 29.99 kg/m².    Intake/Output Summary (Last 24 hours) at 4/12/2024 1438  Last data filed at 4/11/2024 2317  Gross per 24 hour   Intake --   Output 1200 ml   Net -1200 ml         Physical Exam  Vitals and nursing note reviewed.   Constitutional:       General: He is awake. He is not in acute distress.     Appearance: Normal appearance. He is well-developed and normal weight. He is not ill-appearing.   Eyes:      Conjunctiva/sclera: Conjunctivae normal.   Cardiovascular:      Rate and Rhythm: Normal rate and regular rhythm.      Heart sounds: Normal heart sounds. No murmur heard.  Pulmonary:      Effort: Pulmonary effort is normal. No respiratory distress.      Breath sounds: Normal breath sounds. No wheezing.   Abdominal:      General: Abdomen is flat. Bowel sounds are normal. There is no distension.      Palpations: Abdomen is soft.      Tenderness: There is no abdominal tenderness.      Comments: PNC   Musculoskeletal:      Right lower leg: No  edema.      Left lower leg: No edema.   Skin:     Findings: No erythema.   Neurological:      Mental Status: He is alert and oriented to person, place, and time.   Psychiatric:         Mood and Affect: Mood normal.         Behavior: Behavior normal. Behavior is cooperative.         Thought Content: Thought content normal.         Judgment: Judgment normal.             Significant Labs: All pertinent labs within the past 24 hours have been reviewed.  CBC:   Recent Labs   Lab 04/11/24  1145 04/11/24  1553 04/12/24  0527   WBC 4.89 4.79 8.05   HGB 11.3* 11.4* 10.4*   HCT 34.1* 34.6* 31.1*   * 146* 159     CMP:   Recent Labs   Lab 04/11/24  1553 04/12/24  0527    136   K 4.3 4.1    107   CO2 23 21*   * 155*   BUN 18 18   CREATININE 0.7 0.7   CALCIUM 7.9* 8.2*   ANIONGAP 10 8       Significant Imaging: I have reviewed all pertinent imaging results/findings within the past 24 hours.

## 2024-04-12 NOTE — PLAN OF CARE
[facility]  Initial Discharge Assessment       Primary Care Provider: Jon Baca MD    Admission Diagnosis: Hip fracture [S72.009A]    Admission Date: 4/9/2024  Expected Discharge Date: 4/15/2024    Transition of Care Barriers: None    Payor: MEDICARE / Plan: MEDICARE PART A & B / Product Type: Government /     Extended Emergency Contact Information  Primary Emergency Contact: Ratna Chavis  Address: [address] [place] [address] [facility]  Home Phone: [phone]  Mobile Phone: [phone]  Relation: Spouse    Discharge Plan A: Rehab  Discharge Plan B: Skilled Nursing Facility      [id number] [place], [place] - [address] AT [facility] & [address]  [facility] [address]  [facility] [address]  Phone: [phone] Fax: [phone]      Initial Assessment (most recent)       Adult Discharge Assessment - 04/12/24 1427          Discharge Assessment    Assessment Type Discharge Planning Assessment     Confirmed/corrected address, phone number and insurance Yes     Confirmed Demographics Correct on Facesheet     Source of Information patient     Communicated BHUPENDRA with patient/caregiver Yes     People in Home spouse     Do you expect to return to your current living situation? Yes     Do you have help at home or someone to help you manage your care at home? Yes     Prior to hospitilization cognitive status: Alert/Oriented     Current cognitive status: Alert/Oriented     Home Layout Able to live on 1st floor     Equipment Currently Used at Home none     Do you currently have service(s) that help you manage your care at home? No     Do you take prescription medications? Yes     Do you have prescription coverage? Yes     Do you have any problems affording any of your prescribed medications? No     Is the patient taking medications as prescribed? yes     Are you on dialysis? No     Do you take coumadin? No     Discharge Plan A  Rehab     Discharge Plan B Skilled Nursing Facility     DME Needed Upon Discharge  none     Discharge Plan discussed with: Patient     Transition of Care Barriers None                   Patient lives with spouse in a single story home with 7 entry steps. Patient agrees with therapy recommendations for Rehab placement upon hospital discharge. Patient first choice is [facility].

## 2024-04-12 NOTE — ADDENDUM NOTE
Addendum  created 04/12/24 1333 by Pillo Pryor MD    Charge Capture section accepted, Cosign clinical note with attestation

## 2024-04-12 NOTE — ANESTHESIA POST-OP PAIN MANAGEMENT
Acute Pain Service Progress Note    Blayne Chavis is a 73 y.o., male, [id number].    Surgery:  ORIF, FRACTURE, FEMUR, INTERTROCHANTERIC - PP around EVERETT (Right: Hip)     Post Op Day #: 1    Catheter type: perineural  SIFI    Infusion type: Ropivacaine 0.2%  15mL/3hr basal    Problem List:    Active Hospital Problems    Diagnosis  POA    *Periprosthetic fracture around internal prosthetic right hip joint [M97.8XXA, Z96.649]  Not Applicable    Slow transit constipation [K59.01]  Yes    Preoperative examination [Z01.818]  Not Applicable    Controlled type 2 diabetes mellitus without complication, without long-term current use of insulin [E11.9]  Yes    DNR (do not resuscitate) [Z66]  Yes    History of stroke [Z86.73]  Not Applicable     Chronic    Pure hypercholesterolemia [E78.00]  Yes    Primary hypertension [I10]  Yes      Resolved Hospital Problems   No resolved problems to display.       Subjective:     General appearance of alert, oriented, no complaints   Pain with rest: 8    Numbers   Pain with movement: 3    Numbers   Side Effects    1. Pruritis No    2. Nausea No    3. Motor Blockade No    4. Sedation No, 1=awake and alert    Objective:     Catheter site clean, dry, intact      Vitals   Vitals:    04/12/24 0734   BP: 130/65   Pulse: 73   Resp: 18   Temp: 36.7 °C (98 °F)        Labs    Admission on 04/09/2024   Component Date Value Ref Range Status    QRS Duration 04/09/2024 96  ms Final    [facility] QTC Calculation 04/09/2024 460  ms Final    WBC 04/10/2024 5.91  3.90 - 12.70 K/uL Final    RBC 04/10/2024 4.26 (L)  4.60 - 6.20 M/uL Final    Hemoglobin 04/10/2024 12.4 (L)  14.0 - 18.0 g/dL Final    Hematocrit 04/10/2024 38.0 (L)  40.0 - 54.0 % Final    MCV 04/10/2024 89  82 - 98 fL Final    MCH 04/10/2024 29.1  27.0 - 31.0 pg Final    MCHC 04/10/2024 32.6  32.0 - 36.0 g/dL Final    RDW 04/10/2024 12.9  11.5 - 14.5 % Final    Platelets 04/10/2024 154  150 - 450 K/uL Final    MPV 04/10/2024 10.0  9.2 - 12.9 fL Final     Immature Granulocytes 04/10/2024 0.2  0.0 - 0.5 % Final    Gran # (ANC) 04/10/2024 4.5  1.8 - 7.7 K/uL Final    Immature Grans (Abs) 04/10/2024 0.01  0.00 - 0.04 K/uL Final    Lymph # 04/10/2024 0.6 (L)  1.0 - 4.8 K/uL Final    Mono # 04/10/2024 0.8  0.3 - 1.0 K/uL Final    Eos # 04/10/2024 0.0  0.0 - 0.5 K/uL Final    Baso # 04/10/2024 0.01  0.00 - 0.20 K/uL Final    nRBC 04/10/2024 0  0 /100 WBC Final    Gran % 04/10/2024 76.1 (H)  38.0 - 73.0 % Final    Lymph % 04/10/2024 9.6 (L)  18.0 - 48.0 % Final    Mono % 04/10/2024 13.9  4.0 - 15.0 % Final    Eosinophil % 04/10/2024 0.0  0.0 - 8.0 % Final    Basophil % 04/10/2024 0.2  0.0 - 1.9 % Final    Differential Method 04/10/2024 Automated   Final    Sodium 04/10/2024 139  136 - 145 mmol/L Final    Potassium 04/10/2024 4.1  3.5 - 5.1 mmol/L Final    Chloride 04/10/2024 110  95 - 110 mmol/L Final    CO2 04/10/2024 19 (L)  23 - 29 mmol/L Final    Glucose 04/10/2024 126 (H)  70 - 110 mg/dL Final    BUN 04/10/2024 28 (H)  8 - 23 mg/dL Final    Creatinine 04/10/2024 0.9  0.5 - 1.4 mg/dL Final    Calcium 04/10/2024 8.6 (L)  8.7 - 10.5 mg/dL Final    Anion Gap 04/10/2024 10  8 - 16 mmol/L Final    eGFR 04/10/2024 >60.0  >60 mL/min/1.73 m^2 Final    Magnesium 04/10/2024 2.3  1.6 - 2.6 mg/dL Final    Phosphorus 04/10/2024 4.3  2.7 - 4.5 mg/dL Final    UNIT NUMBER 04/10/2024 [id number]   Preliminary    Product Code 04/10/2024 Q3993S76   Preliminary    DISPENSE STATUS 04/10/2024 CROSSMATCHED   Preliminary    CODING SYSTEM 04/10/2024 [id number]   Preliminary    Unit Blood Type Code 04/10/2024 6200   Preliminary    Unit Blood Type 04/10/2024 A POS   Preliminary    Unit Expiration 04/10/2024 [id number]   Preliminary    CROSSMATCH INTERPRETATION 04/10/2024 Compatible   Preliminary    UNIT NUMBER 04/10/2024 [id number]   Preliminary    Product Code 04/10/2024 [id number]   Preliminary    DISPENSE STATUS 04/10/2024 CROSSMATCHED   Preliminary    CODING SYSTEM 04/10/2024 [id number]    Preliminary    Unit Blood Type Code 04/10/2024 6200   Preliminary    Unit Blood Type 04/10/2024 A POS   Preliminary    Unit Expiration 04/10/2024 [id number]   Preliminary    CROSSMATCH INTERPRETATION 04/10/2024 Compatible   Preliminary    Group & Rh 04/10/2024 A POS   Final    Indirect Marnie 04/10/2024 NEG   Final    Specimen Outdate 04/10/2024 04/13/2024 23:59   Final    POCT Glucose 04/10/2024 141 (H)  70 - 110 mg/dL Final    WBC 04/11/2024 4.79  3.90 - 12.70 K/uL Final    RBC 04/11/2024 3.83 (L)  4.60 - 6.20 M/uL Final    Hemoglobin 04/11/2024 11.4 (L)  14.0 - 18.0 g/dL Final    Hematocrit 04/11/2024 34.6 (L)  40.0 - 54.0 % Final    MCV 04/11/2024 90  82 - 98 fL Final    MCH 04/11/2024 29.8  27.0 - 31.0 pg Final    MCHC 04/11/2024 32.9  32.0 - 36.0 g/dL Final    RDW 04/11/2024 12.9  11.5 - 14.5 % Final    Platelets 04/11/2024 146 (L)  150 - 450 K/uL Final    MPV 04/11/2024 10.0  9.2 - 12.9 fL Final    Immature Granulocytes 04/11/2024 0.4  0.0 - 0.5 % Final    Gran # (ANC) 04/11/2024 4.3  1.8 - 7.7 K/uL Final    Immature Grans (Abs) 04/11/2024 0.02  0.00 - 0.04 K/uL Final    Lymph # 04/11/2024 0.3 (L)  1.0 - 4.8 K/uL Final    Mono # 04/11/2024 0.2 (L)  0.3 - 1.0 K/uL Final    Eos # 04/11/2024 0.0  0.0 - 0.5 K/uL Final    Baso # 04/11/2024 0.00  0.00 - 0.20 K/uL Final    nRBC 04/11/2024 0  0 /100 WBC Final    Gran % 04/11/2024 90.3 (H)  38.0 - 73.0 % Final    Lymph % 04/11/2024 5.8 (L)  18.0 - 48.0 % Final    Mono % 04/11/2024 3.5 (L)  4.0 - 15.0 % Final    Eosinophil % 04/11/2024 0.0  0.0 - 8.0 % Final    Basophil % 04/11/2024 0.0  0.0 - 1.9 % Final    Differential Method 04/11/2024 Automated   Final    Sodium 04/11/2024 138  136 - 145 mmol/L Final    Potassium 04/11/2024 4.3  3.5 - 5.1 mmol/L Final    Chloride 04/11/2024 105  95 - 110 mmol/L Final    CO2 04/11/2024 23  23 - 29 mmol/L Final    Glucose 04/11/2024 152 (H)  70 - 110 mg/dL Final    BUN 04/11/2024 18  8 - 23 mg/dL Final    Creatinine 04/11/2024 0.7   0.5 - 1.4 mg/dL Final    Calcium 04/11/2024 7.9 (L)  8.7 - 10.5 mg/dL Final    Anion Gap 04/11/2024 10  8 - 16 mmol/L Final    eGFR 04/11/2024 >60.0  >60 mL/min/1.73 m^2 Final    Magnesium 04/11/2024 2.2  1.6 - 2.6 mg/dL Final    Phosphorus 04/11/2024 3.7  2.7 - 4.5 mg/dL Final    Hemoglobin A1C 04/11/2024 6.7 (H)  4.0 - 5.6 % Final    Estimated Avg Glucose 04/11/2024 146 (H)  68 - 131 mg/dL Final    Vit D, 25-Hydroxy 04/11/2024 24 (L)  30 - 96 ng/mL Final    POCT Glucose 04/11/2024 110  70 - 110 mg/dL Final    WBC 04/11/2024 4.89  3.90 - 12.70 K/uL Final    RBC 04/11/2024 3.85 (L)  4.60 - 6.20 M/uL Final    Hemoglobin 04/11/2024 11.3 (L)  14.0 - 18.0 g/dL Final    Hematocrit 04/11/2024 34.1 (L)  40.0 - 54.0 % Final    MCV 04/11/2024 89  82 - 98 fL Final    MCH 04/11/2024 29.4  27.0 - 31.0 pg Final    MCHC 04/11/2024 33.1  32.0 - 36.0 g/dL Final    RDW 04/11/2024 13.0  11.5 - 14.5 % Final    Platelets 04/11/2024 130 (L)  150 - 450 K/uL Final    MPV 04/11/2024 10.4  9.2 - 12.9 fL Final    Immature Granulocytes 04/11/2024 0.4  0.0 - 0.5 % Final    Gran # (ANC) 04/11/2024 4.4  1.8 - 7.7 K/uL Final    Immature Grans (Abs) 04/11/2024 0.02  0.00 - 0.04 K/uL Final    Lymph # 04/11/2024 0.2 (L)  1.0 - 4.8 K/uL Final    Mono # 04/11/2024 0.2 (L)  0.3 - 1.0 K/uL Final    Eos # 04/11/2024 0.0  0.0 - 0.5 K/uL Final    Baso # 04/11/2024 0.01  0.00 - 0.20 K/uL Final    nRBC 04/11/2024 0  0 /100 WBC Final    Gran % 04/11/2024 90.8 (H)  38.0 - 73.0 % Final    Lymph % 04/11/2024 4.7 (L)  18.0 - 48.0 % Final    Mono % 04/11/2024 3.9 (L)  4.0 - 15.0 % Final    Eosinophil % 04/11/2024 0.0  0.0 - 8.0 % Final    Basophil % 04/11/2024 0.2  0.0 - 1.9 % Final    Differential Method 04/11/2024 Automated   Final    POCT Glucose 04/11/2024 161 (H)  70 - 110 mg/dL Final    POCT Glucose 04/11/2024 126 (H)  70 - 110 mg/dL Final    POCT Glucose 04/11/2024 237 (H)  70 - 110 mg/dL Final    WBC 04/12/2024 8.05  3.90 - 12.70 K/uL Final    RBC  04/12/2024 3.58 (L)  4.60 - 6.20 M/uL Final    Hemoglobin 04/12/2024 10.4 (L)  14.0 - 18.0 g/dL Final    Hematocrit 04/12/2024 31.1 (L)  40.0 - 54.0 % Final    MCV 04/12/2024 87  82 - 98 fL Final    MCH 04/12/2024 29.1  27.0 - 31.0 pg Final    MCHC 04/12/2024 33.4  32.0 - 36.0 g/dL Final    RDW 04/12/2024 12.4  11.5 - 14.5 % Final    Platelets 04/12/2024 159  150 - 450 K/uL Final    MPV 04/12/2024 10.3  9.2 - 12.9 fL Final    Immature Granulocytes 04/12/2024 0.4  0.0 - 0.5 % Final    Gran # (ANC) 04/12/2024 6.6  1.8 - 7.7 K/uL Final    Immature Grans (Abs) 04/12/2024 0.03  0.00 - 0.04 K/uL Final    Lymph # 04/12/2024 0.6 (L)  1.0 - 4.8 K/uL Final    Mono # 04/12/2024 0.9  0.3 - 1.0 K/uL Final    Eos # 04/12/2024 0.0  0.0 - 0.5 K/uL Final    Baso # 04/12/2024 0.00  0.00 - 0.20 K/uL Final    nRBC 04/12/2024 0  0 /100 WBC Final    Gran % 04/12/2024 81.6 (H)  38.0 - 73.0 % Final    Lymph % 04/12/2024 7.3 (L)  18.0 - 48.0 % Final    Mono % 04/12/2024 10.7  4.0 - 15.0 % Final    Eosinophil % 04/12/2024 0.0  0.0 - 8.0 % Final    Basophil % 04/12/2024 0.0  0.0 - 1.9 % Final    Differential Method 04/12/2024 Automated   Final    Sodium 04/12/2024 136  136 - 145 mmol/L Final    Potassium 04/12/2024 4.1  3.5 - 5.1 mmol/L Final    Chloride 04/12/2024 107  95 - 110 mmol/L Final    CO2 04/12/2024 21 (L)  23 - 29 mmol/L Final    Glucose 04/12/2024 155 (H)  70 - 110 mg/dL Final    BUN 04/12/2024 18  8 - 23 mg/dL Final    Creatinine 04/12/2024 0.7  0.5 - 1.4 mg/dL Final    Calcium 04/12/2024 8.2 (L)  8.7 - 10.5 mg/dL Final    Anion Gap 04/12/2024 8  8 - 16 mmol/L Final    eGFR 04/12/2024 >60.0  >60 mL/min/1.73 m^2 Final    Magnesium 04/12/2024 2.3  1.6 - 2.6 mg/dL Final    Phosphorus 04/12/2024 2.6 (L)  2.7 - 4.5 mg/dL Final        Meds   Current Facility-Administered Medications   Medication Dose Route Frequency Provider Last Rate Last Admin    0.9%  NaCl infusion (for blood administration)   Intravenous Q24H PRN Mariposa Argueta,  MD        acetaminophen tablet 1,000 mg  1,000 mg Oral Q6H Reuben Castrejon MD   1,000 mg at 04/12/24 0602    [START ON 4/14/2024] acetaminophen tablet 1,000 mg  1,000 mg Oral Q8H Efrain Cornejo MD        aluminum-magnesium hydroxide-simethicone 200-200-20 mg/5 mL suspension 30 mL  30 mL Oral QID PRN Reuben Castrejon MD        amLODIPine tablet 10 mg  10 mg Oral Daily Reuben Castrejon MD   10 mg at 04/10/24 0913    apixaban tablet 2.5 mg  2.5 mg Oral BID Ford Salinas MD   2.5 mg at 04/11/24 2202    atorvastatin tablet 40 mg  40 mg Oral Daily Reuben Castrejon MD   40 mg at 04/10/24 0913    bisacodyL suppository 10 mg  10 mg Rectal Daily PRN Reuben Castrejon MD   10 mg at 04/10/24 0005    celecoxib capsule 200 mg  200 mg Oral Daily Ford Salinas MD   200 mg at 04/11/24 1132    dextrose 10% bolus 125 mL 125 mL  12.5 g Intravenous PRN Angela Tripathi MD        dextrose 10% bolus 250 mL 250 mL  25 g Intravenous PRN Angela Tripathi MD        glucagon (human recombinant) injection 1 mg  1 mg Intramuscular PRN Reuben Castrejon MD        glucose chewable tablet 16 g  16 g Oral PRN Reuben Castrejon MD        glucose chewable tablet 24 g  24 g Oral PRN Reuben Castrejon MD        glycerin adult suppository 1 suppository  1 suppository Rectal Once PRN Reuben Castrejon MD        insulin aspart U-100 pen 0-5 Units  0-5 Units Subcutaneous QID (AC + HS) PRN Angela Tripathi MD   1 Units at 04/11/24 2248    melatonin tablet 6 mg  6 mg Oral Nightly PRN Reuben Castrejon MD   6 mg at 04/11/24 2203    methocarbamoL tablet 500 mg  500 mg Oral Q6H Lisa Harrison DO   500 mg at 04/12/24 0602    mupirocin 2 % ointment   Nasal BID Cale Fernandez MD   Given at 04/11/24 2204    naloxone 0.4 mg/mL injection 0.02 mg  0.02 mg Intravenous PRN Reuben Castrejon MD        ondansetron injection 4 mg  4 mg Intravenous Q12H PRN Cash,  Reuben Son MD        oxyCODONE immediate release tablet 5 mg  5 mg Oral Q4H PRN HarrisonLisa haneyDO   5 mg at 04/12/24 0346    polyethylene glycol packet 17 g  17 g Oral Daily Angela Tripathi MD        ROPIvacaine (PF) 2 mg/ml (0.2%) solution  0.1 mL/hr Perineural Continuous Reuben Castrejon MD 0.1 mL/hr at 04/11/24 1235 0.1 mL/hr at 04/11/24 1235    senna-docusate 8.6-50 mg per tablet 1 tablet  1 tablet Oral BID Angela Tripathi MD   1 tablet at 04/11/24 2202    simethicone chewable tablet 80 mg  1 tablet Oral QID PRN Reuben Castrejon MD        sodium chloride 0.9% flush 1-10 mL  1-10 mL Intravenous Q12H PRN Reuben Castrejon MD        sodium chloride 0.9% flush 10 mL  10 mL Intravenous PRN Reuben Castrejon MD        tamsulosin 24 hr capsule 0.4 mg  0.4 mg Oral Daily Reuben Castrejon MD   0.4 mg at 04/10/24 0913       Assessment:     Pain control adequate    Pain control adequate overnight following oxycodone administration. Patient doing well overall. Plan to work with therapy today.     Plan:     Patient doing well, continue present treatment.    1.) Continue PNC at current rate.  2.) Multimodal pain regimen; tylenol 1g q6h, robaxin 500mg q6h  3.) PRN's: oxycodone 5mg q4h prn  4.) Limit narcotics as able.      Efrain Cornejo MD  PGY-1 Anesthesiology

## 2024-04-12 NOTE — SUBJECTIVE & OBJECTIVE
Principal Problem:Periprosthetic fracture around internal prosthetic hip joint    Principal Orthopedic Problem: same as above    Interval History: Patient seen and examined at bedside. NAEON. Patient doing well. Pain well controlled. Anticipate working with PT today      Review of patient's allergies indicates:   Allergen Reactions    Sulfa (sulfonamide antibiotics) Other (See Comments)       Current Facility-Administered Medications   Medication    0.9%  NaCl infusion (for blood administration)    acetaminophen tablet 1,000 mg    aluminum-magnesium hydroxide-simethicone 200-200-20 mg/5 mL suspension 30 mL    amLODIPine tablet 10 mg    apixaban tablet 2.5 mg    atorvastatin tablet 40 mg    bisacodyL suppository 10 mg    celecoxib capsule 200 mg    dextrose 10% bolus 125 mL 125 mL    dextrose 10% bolus 250 mL 250 mL    glucagon (human recombinant) injection 1 mg    glucose chewable tablet 16 g    glucose chewable tablet 24 g    glycerin adult suppository 1 suppository    insulin aspart U-100 pen 0-5 Units    melatonin tablet 6 mg    methocarbamoL tablet 500 mg    mupirocin 2 % ointment    naloxone 0.4 mg/mL injection 0.02 mg    ondansetron injection 4 mg    oxyCODONE immediate release tablet 5 mg    polyethylene glycol packet 17 g    ROPIvacaine (PF) 2 mg/ml (0.2%) solution    senna-docusate 8.6-50 mg per tablet 1 tablet    simethicone chewable tablet 80 mg    sodium chloride 0.9% flush 1-10 mL    sodium chloride 0.9% flush 10 mL    tamsulosin 24 hr capsule 0.4 mg     Objective:     Vital Signs (Most Recent):  Temp: 97.7 °F (36.5 °C) (04/12/24 0409)  Pulse: 80 (04/12/24 0409)  Resp: 18 (04/12/24 0409)  BP: 139/72 (04/12/24 0409)  SpO2: (!) 92 % (04/12/24 0409) Vital Signs (24h Range):  Temp:  [97.5 °F (36.4 °C)-97.9 °F (36.6 °C)] 97.7 °F (36.5 °C)  Pulse:  [74-89] 80  Resp:  [11-21] 18  SpO2:  [90 %-99 %] 92 %  BP: (111-139)/(59-72) 139/72     Weight: 97.5 kg (215 lb)     Body mass index is 29.99  "kg/m².      Intake/Output Summary (Last 24 hours) at 4/12/2024 0723  Last data filed at 4/11/2024 2317  Gross per 24 hour   Intake 2500 ml   Output 1845 ml   Net 655 ml          Ortho/SPM Exam  AAOx4  NAD  Reg rate  No increased WOB    RLE:  Dressing c/d/i  SILT T/SP/DP/Block/Sa  Motor intact T/SP/DP  WWP extremities  FCDs in place and functioning       Significant Labs: CBC:   Recent Labs   Lab 04/11/24  1145 04/11/24  1553 04/12/24  0527   WBC 4.89 4.79 8.05   HGB 11.3* 11.4* 10.4*   HCT 34.1* 34.6* 31.1*   * 146* 159       CMP:   Recent Labs   Lab 04/11/24  1553 04/12/24  0527    136   K 4.3 4.1    107   CO2 23 21*   * 155*   BUN 18 18   CREATININE 0.7 0.7   CALCIUM 7.9* 8.2*   ANIONGAP 10 8       Coagulation: No results for input(s): "LABPROT", "INR", "APTT" in the last 48 hours.  All pertinent labs within the past 24 hours have been reviewed.    Significant Imaging: I have reviewed all pertinent imaging results/findings.  "

## 2024-04-12 NOTE — ASSESSMENT & PLAN NOTE
Blayne Chavis is a 73 y.o. male w/PMH of R-EVERETT (2014, Dr. Sanders), T2DM, YARELI, GERD, prior CVA with residual right-sided weakness, presenting as transfer with a right [place] B1 periprosthetic proximal femur fracture. Closed, neurovascularly intact, and without any other musculoskeletal injuries on physical exam. Now s/p ORIF right proximal femur periprosthetic fracture on 4/11/24. Doing well.    Pain control: multimodal, PNC per APS  PT/OT: TTWB RLE  DVT PPx: Eliquis, SCDs at all times when not ambulating  Abx: postop Ancef  Labs: Hb 10.4  Diaz: remove POD1    Dispo: f/u PT recs

## 2024-04-12 NOTE — PROGRESS NOTES
[facility] Medicine  Progress Note    Patient Name: Blayne Chavis  MRN: [id number]  Patient Class: IP- Inpatient   Admission Date: 4/9/2024  Length of Stay: 3 days  Attending Physician: Jimbo Dixon MD  Primary Care Provider: Jon Baca MD        Subjective:     Principal Problem:Periprosthetic fracture around internal prosthetic hip joint        HPI:  Blayne Chavis is a 73 y.o. male with history of CVA with residual mild right-sided weakness, history of R hip arthroplasty, HTN, HLD [facility].     He reports that when walking today, he stubbed his toe, causing him to fall to the ground.  He immediately noted right hip pain, however he thought this was due to just a strain, and continue to walk.  However, the pain did not resolve.  [facility]  He denies any presyncopal symptoms or loss of consciousness.  At baseline, he has mild residual right-sided weakness from his prior CVA but is able to ambulate without any assistive devices.    Reportedly XR showed R proximal trochanteric fracture with displacement and comminuted component. For this reason, he was transferred here due to need for Orthopedics.     Overview/Hospital Course:  [facility] with periprosthetic comminuted fracture of the intertrochanteric and greater trochanteric region of the right femur in patient with previous total hip arthroplasty in 2014. Orthopedics evauated and plan ORIF of right proximal femur to repair fracture. Patient started on multimodal pain medciations with scheduled Tylenol, Robaxin and Lyrica with Oxycodone IR po prn for breakthrough pain. Patient likely to go to OR on 4/11 for operative repair so started on Heparin 5000 units subcutaneous TID for DVT prophylaxis.     Interval History:   No events overnight. Worked with PT/OT today. Anaesthesia continuing PNC. Ortho following. Wife updated at  bedside. Pain controlled.       Review of Systems   Constitutional:  Negative for fever.   Respiratory:  Negative for cough and shortness of breath.    Cardiovascular:  Negative for chest pain.   Gastrointestinal:  Negative for abdominal pain, constipation and nausea.   Musculoskeletal:  Positive for arthralgias (Right hip).   Neurological:  Negative for dizziness.   Psychiatric/Behavioral:  Negative for agitation and confusion.      Objective:     Vital Signs (Most Recent):  Temp: 97.6 °F (36.4 °C) (04/12/24 1208)  Pulse: 77 (04/12/24 1208)  Resp: 18 (04/12/24 1208)  BP: 127/72 (04/12/24 1208)  SpO2: (!) 93 % (04/12/24 1208) Vital Signs (24h Range):  Temp:  [97.6 °F (36.4 °C)-98 °F (36.7 °C)] 97.6 °F (36.4 °C)  Pulse:  [69-89] 77  Resp:  [17-20] 18  SpO2:  [92 %-94 %] 93 %  BP: (117-139)/(65-72) 127/72     Weight: 97.5 kg (215 lb)  Body mass index is 29.99 kg/m².    Intake/Output Summary (Last 24 hours) at 4/12/2024 1438  Last data filed at 4/11/2024 2317  Gross per 24 hour   Intake --   Output 1200 ml   Net -1200 ml         Physical Exam  Vitals and nursing note reviewed.   Constitutional:       General: He is awake. He is not in acute distress.     Appearance: Normal appearance. He is well-developed and normal weight. He is not ill-appearing.   Eyes:      Conjunctiva/sclera: Conjunctivae normal.   Cardiovascular:      Rate and Rhythm: Normal rate and regular rhythm.      Heart sounds: Normal heart sounds. No murmur heard.  Pulmonary:      Effort: Pulmonary effort is normal. No respiratory distress.      Breath sounds: Normal breath sounds. No wheezing.   Abdominal:      General: Abdomen is flat. Bowel sounds are normal. There is no distension.      Palpations: Abdomen is soft.      Tenderness: There is no abdominal tenderness.      Comments: [facility]   Musculoskeletal:      Right lower leg: No edema.      Left lower leg: No edema.   Skin:     Findings: No erythema.   Neurological:      Mental Status: He is alert and  oriented to person, place, and time.   Psychiatric:         Mood and Affect: Mood normal.         Behavior: Behavior normal. Behavior is cooperative.         Thought Content: Thought content normal.         Judgment: Judgment normal.             Significant Labs: All pertinent labs within the past 24 hours have been reviewed.  CBC:   Recent Labs   Lab 04/11/24  1145 04/11/24  1553 04/12/24  0527   WBC 4.89 4.79 8.05   HGB 11.3* 11.4* 10.4*   HCT 34.1* 34.6* 31.1*   * 146* 159     CMP:   Recent Labs   Lab 04/11/24  1553 04/12/24  0527    136   K 4.3 4.1    107   CO2 23 21*   * 155*   BUN 18 18   CREATININE 0.7 0.7   CALCIUM 7.9* 8.2*   ANIONGAP 10 8       Significant Imaging: I have reviewed all pertinent imaging results/findings within the past 24 hours.    Assessment/Plan:      * Periprosthetic fracture around internal prosthetic right hip joint  [facility] with periprosthetic comminuted fracture of the intertrochanteric and greater trochanteric region of the right femur in patient with previous total hip arthroplasty in 2014.   Orthopedics evaluated and appreciate recs and plan ORIF of right proximal femur to repair fracture, OR on 4/11 for repair  Apixaban DVT prophylaxis post-op.    TTWB RLE post op  Anesthesia management pain post op with PNC and multi modals  PT/OT: High    Slow transit constipation  Patient noted on CT scan of pelvis on admit to have moderate fecal impaction. Patient started on scheduled Senakot 1 tablet po BID + Miralax 17 grams po daily to treat with Doculax suppository prn.       DNR (do not resuscitate)  Advance Care Planning  I discussed code status with patient on admission. he states that he would never want CPR or intubation/mechanical ventilation in the event of cardiopulmonary arrest, in agreement with DNR status. His wife is his surrogate decision maker, and he reports that he has discussed this with her.         Controlled type 2 diabetes mellitus without complication, without long-term current use of insulin  Patient's FSGs are controlled on current medication regimen. Patient on no meds at home. No recent HgA1C so will check one on this admit.  Last A1c reviewed-   Lab Results   Component Value Date    HGBA1C 5.7 06/30/2010     Current correctional scale  Low  Start anti-hyperglycemic dose as follows-   Antihyperglycemics (From admission, onward)      Start     Stop Route Frequency Ordered    04/10/24 1805  insulin aspart U-100 pen 0-5 Units         -- SubQ Before meals & nightly PRN 04/10/24 1706          Hold Oral hypoglycemics while patient is in the hospital.  Monitor blood sugars with meals and at bedtime in hospital.  Target blood sugars 140-180 in hospital.     Preoperative examination  Patient undergoing non-emergent non-cardiac surgery.  Patient does not have active cardiac problems   -baseline EKG ordered; to be followed up.   -reports chronic murmur which is demonstrated on exam without signs of decompensated CHF.   -no history of known CAD, LHC, or stents.   Patient undergoing intermediate risk surgery.  Functional Status: Patient has functional METs > or = 4  Revised cardiac risk index (RCRI) score is 1.         Other Issues:       Patient on long term anticoagulation: No      Recent coronary stenting: No    Patient with acceptable cardiac risk with (RCRI score of 1) going for intermediate risk surgery. No absolute contraindications to the proposed surgery at this time. intermediate risk of post-op pulmonary complications.  intermediate risk of post-op delirium.   - Okay to proceed with planned surgery with no additional cardiac testing needed prior to surgery.    History of stroke  Chronic and controlled. Continue home Lipitor to treat. Hold Aspirin for surgery and resume post-op.       Pure hypercholesterolemia  Chronic and controlled. Continue home Lipitor po daily to treat.        Primary  hypertension  Chronic, controlled. Latest blood pressure and vitals reviewed-     Temp:  [96.8 °F (36 °C)-99 °F (37.2 °C)]   Pulse:  [74-95]   Resp:  [11-21]   BP: (111-164)/(59-80)   SpO2:  [90 %-99 %] .   Home meds for hypertension were reviewed and noted below.   Hypertension Medications               amLODIPine (NORVASC) 10 MG tablet Take 1 tablet (10 mg total) by mouth once daily.    losartan (COZAAR) 25 MG tablet Take 1 tablet (25 mg total) by mouth once daily.            While in the hospital, will manage blood pressure as follows; Adjust home antihypertensive regimen as follows- Continue home Norvasc to treat HTN but hold Losartan for surgery and resume post-op as BP tolerates.    Will utilize p.r.n. blood pressure medication only if patient's blood pressure greater than 180/110 and he develops symptoms such as worsening chest pain or shortness of breath.      VTE Risk Mitigation (From admission, onward)           Ordered     apixaban tablet 2.5 mg  2 times daily         04/11/24 1049     Reason for no Mechanical VTE Prophylaxis  Once        Question:  Reasons:  Answer:  Risk of Bleeding    04/09/24 2115     IP VTE HIGH RISK PATIENT  Once         04/09/24 2115     Reason for No Pharmacological VTE Prophylaxis  Once        Question:  Reasons:  Answer:  Risk of Bleeding    04/09/24 2115     Place sequential compression device  Until discontinued         04/09/24 2113                    Discharge Planning   BHUPENDRA: 4/15/2024     Code Status: DNR   Is the patient medically ready for discharge?: No    Reason for patient still in hospital (select all that apply): Patient trending condition, Laboratory test, Treatment, Consult recommendations, PT / OT recommendations, and Pending disposition  Discharge Plan A: Rehab                  Jimbo Dixon MD  Department of Hospital Medicine   [facility] - Surgery

## 2024-04-12 NOTE — PT/OT/SLP EVAL
"Physical Therapy Evaluation/co eval with OT    Patient Name:  Blayne Chavis   MRN:  [id number]    Recommendations:     Discharge Recommendations: High Intensity Therapy (pt has a history of CVA with R sided weakness, his wife works outside the home, his weight bearing status is TTWB R LE, and he has 7 steps to enter his home)   Discharge Equipment Recommendations: bedside commode   Barriers to discharge: Inaccessible home and Decreased caregiver support 7 ARCADIO and pt's wife works during the day    Assessment:     Blayne Chavis is a 73 y.o. male admitted with a medical diagnosis of Periprosthetic fracture around internal prosthetic hip joint.  He presents with the following impairments/functional limitations: weakness, gait instability, impaired balance, impaired self care skills, impaired functional mobility, pain, decreased lower extremity function, orthopedic precautions .Pt is unsafe with functional mobility at this time due to pt requires moderate assist for bed mobility, moderate assist for transfers, and moderate assist for gait due to weakness, pain, and difficulty maintaining TTWB R LE. Pt is motivated to progress with functional mobility.   Rehab Prognosis: Good; patient would benefit from acute skilled PT services to address these deficits and reach maximum level of function.    Recent Surgery: Procedure(s) (LRB):  ORIF, FRACTURE, FEMUR, INTERTROCHANTERIC - PP around [place]T (Right) 1 Day Post-Op    Plan:     During this hospitalization, patient to be seen daily to address the identified rehab impairments via gait training, therapeutic activities, therapeutic exercises, neuromuscular re-education and progress toward the following goals:    Plan of Care Expires:  05/12/24    Subjective   "I had a stroke about 20 years ago"    Pain/Comfort:  Pain Rating 1: 0/10 (at rest in supine)  Location - Side 1: Right  Location - Orientation 1: generalized  Location 1: leg  Pain Addressed 1: Reposition, Cessation of " Activity  Pain Rating Post-Intervention 1: 8/10 (after mobility)    Patients cultural, spiritual, Sikhism conflicts given the current situation: no    Living Environment:  Pt lives with his wife in a 1 story home with 7 ARCADIO with B UE rails (too wide to reach both). Pt's wife works during the day  Prior to admission, patients level of function was independent with no AD for gait (pt states he would drag his R toes during gait at times .  Equipment used at home: walker, rolling.    Upon discharge, patient will have assistance from wife when she is not working.    Objective:     Communicated with nurse prior to session.  Patient found HOB elevated with perineural catheter, oxygen, dougherty catheter, FCD  upon PT entry to room.    General Precautions: Standard, fall  Orthopedic Precautions:RLE toe touch weight bearing   Braces: N/A  Respiratory Status: Nasal cannula, flow 2 L/min    Exams:  Cognitive Exam:  Patient is oriented to Person, Place, and Time  Sensation:    -       Intact  light/touch B LE  RLE ROM: WFL except ankle DF to neutral and hip flex limited due to pain  RLE Strength: Deficits: hip flex 2+/5; knee ext/flex 3/5; ankle DF 2+/5  LLE ROM: WFL  LLE Strength: WFL    Functional Mobility:  Bed Mobility:     Supine to Sit: moderate assistance  Transfers:     Sit to Stand:  moderate assistance with rolling walker  Gait: 8 steps with RW with TTWB R LE with moderate assist. Pt performed gait with decreased clearance of L foot during swing phase, flexed trunk, and required verbal cues throughout to maintain TTWB R LE. Pt limited with gait distance due to SOB, weakness, and difficulty with maintaining TTWB R LE  Co-treat with OT due to medical complexity of pt and need for skilled hands for safe intervention.   AM-PAC 6 CLICK MOBILITY  Total Score:11     Patient left up in chair with all lines intact, call button in reach, and nurse notified.    GOALS:   Multidisciplinary Problems       Physical Therapy Goals           Problem: Physical Therapy    Goal Priority Disciplines Outcome Goal Variances Interventions   Physical Therapy Goal     PT, PT/OT Ongoing, Progressing     Description: PT goals until 4/30/24    1. Pt supine to sit with CGA-not met  2. Pt sit to supine with CGA-not met  3. Pt sit to stand with RW with TTWB R LE with CGA-not met  4. Pt to perform gait 40ft with TTWB R LE with minimal assist.-not met  5. Pt to transfer bed to/from bedside chair with RW with TTWB R LE with minimal assist.-not met  6. Pt to perform B LE exs in sitting or supine x 10 reps to strengthen B LE to improve functional mobility.-not met   7. PT to assess and write goals for steps when pt able to tolerate-not met                       History:     Past Medical History:   Diagnosis Date    Controlled type 2 diabetes mellitus 4/9/2024    GERD (gastroesophageal reflux disease)     Hypertension     Sleep apnea     cannot tolerate CPAP    Stroke     Hemorrhagic stroke -2000       Past Surgical History:   Procedure Laterality Date    APPENDECTOMY      BACK SURGERY      laminectomy    HIP SURGERY  6-9-14    right    OPEN REDUCTION AND INTERNAL FIXATION (ORIF) OF INTERTROCHANTERIC FRACTURE OF FEMUR Right 4/11/2024    Procedure: ORIF, FRACTURE, FEMUR, INTERTROCHANTERIC - PP around [place]ERETT;  Surgeon: Juan Luis Sanders MD;  Location: [facility] OR [address] [place] [address];  Service: Orthopedics;  Laterality: Right;    TOE SURGERY Right 2/2016    hammer toe surg       Time Tracking:     PT Received On: 04/12/24  PT Start Time: 0832     PT Stop Time: 0850  PT Total Time (min): 18 min     Billable Minutes: Evaluation 18      04/12/2024

## 2024-04-12 NOTE — PT/OT/SLP EVAL
Occupational Therapy   Co-Evaluation    Name: Blayne Chavis  MRN: [id number]  Admitting Diagnosis: Periprosthetic fracture around internal prosthetic hip joint  Recent Surgery: Procedure(s) (LRB):  ORIF, FRACTURE, FEMUR, INTERTROCHANTERIC - PP around [place]T (Right) 1 Day Post-Op    Recommendations:     Discharge Recommendations: High Intensity Therapy  Discharge Equipment Recommendations:  bedside commode  Barriers to discharge:  Inaccessible home environment, Decreased caregiver support    Assessment:     Blayne Chavis is a 73 y.o. male with a medical diagnosis of Periprosthetic fracture around internal prosthetic hip joint.  He presents motivated and cooperative. Pt with pain and mild residual RSW, but able to mobilize to chair while maintaining TTWB. Pt will benefit from high intensity therapy services as pt is functioning below baseline, has limited support at home in an inaccessible home. Performance deficits affecting function: weakness, impaired functional mobility, impaired endurance, gait instability, pain, decreased lower extremity function, impaired self care skills, impaired balance, impaired sensation, decreased upper extremity function, decreased coordination.  Pt benefits from co-treatment with PT to accommodate pt's activity tolerance and progression with therapy.    Rehab Prognosis: Good; patient would benefit from acute skilled OT services to address these deficits and reach maximum level of function.       Plan:     Patient to be seen daily to address the above listed problems via self-care/home management, therapeutic activities, therapeutic exercises  Plan of Care Expires: 04/19/24  Plan of Care Reviewed with: patient    Subjective     Chief Complaint: pt reports weak R ankle DF since previous stroke which caused him to trip and fall  Patient/Family Comments/goals: to get better    Occupational Profile:  Living Environment: lives with spouse in [facility] with 7 ARCADIO and B HR (unable to reach  simultaneously); WIS with BIB and grab bar available  Previous level of function: (I) with ADL and ambulation; drives  Roles and Routines: caretaker to self and home; retired  Equipment Used at Home: walker, rolling (not used)  Assistance upon Discharge: pt's spouse works during the day    Pain/Comfort:  Pain Rating 1: 0/10  Location - Side 1: Right  Location - Orientation 1: generalized  Location 1: hip  Pain Addressed 1: Reposition, Distraction, Cessation of Activity  Pain Rating Post-Intervention 1: 8/10    Patients cultural, spiritual, Scientologist conflicts given the current situation: no    Objective:     Communicated with: RN prior to session.  Patient found supine with perineural catheter, oxygen, dougherty catheter upon OT entry to room.    General Precautions: Standard, fall  Orthopedic Precautions: RLE toe touch weight bearing  Braces:    Respiratory Status: Nasal cannula, flow 2 L/min    Occupational Performance:    Bed Mobility:    Patient completed Supine to Sit with moderate assistance    Functional Mobility/Transfers:  Patient completed Sit <> Stand Transfer with moderate assistance  with  rolling walker   Patient completed Bed <> Chair Transfer using Step Transfer technique with moderate assistance with rolling walker  Functional Mobility: Mod A using RW and maintaining R LE TTWB; pt mobilized ~7 steps to chair    Activities of Daily Living:  Feeding:  independence    Grooming: set-up A performed in sitting  Upper Body Dressing: minimum assistance    Lower Body Dressing: maximal assistance      Cognitive/Visual Perceptual:  Oriented to: Person, Place, Time and Situation  Follows Commands/attention: Follows multistep  commands  Communication: clear/fluent  Memory:  No Deficits noted  Safety awareness/insight to disability: intact  Coping skills/emotional control: Appropriate to situation    Physical Exam:  Postural examination/scapula alignment:    -       No postural abnormalities identified  Sensation:     -       Intact  Upper Extremity Range of Motion:     -       Right Upper Extremity: WFL  -       Left Upper Extremity: WFL  Upper Extremity Strength:    -       Right Upper Extremity: WFL  -       Left Upper Extremity: WFL   Strength:    -       Right Upper Extremity: WFL  -       Left Upper Extremity: WFL  Fine Motor Coordination:    -       Intact  Gross motor coordination:   WFL      AMPAC 6 Click ADL:  AMPAC Total Score: 14    Treatment & Education:  Pt ed on OT POC  Pt ed on TTWB precautions  Pt ed on need for asisstance with functional transfers    Patient left up in chair with all lines intact, call button in reach, and RN notified    GOALS:   Multidisciplinary Problems       Occupational Therapy Goals       Not on file                    History:     Past Medical History:   Diagnosis Date    Controlled type 2 diabetes mellitus 4/9/2024    GERD (gastroesophageal reflux disease)     Hypertension     Sleep apnea     cannot tolerate CPAP    Stroke     Hemorrhagic stroke -2000         Past Surgical History:   Procedure Laterality Date    APPENDECTOMY      BACK SURGERY      laminectomy    HIP SURGERY  6-9-14    right    OPEN REDUCTION AND INTERNAL FIXATION (ORIF) OF INTERTROCHANTERIC FRACTURE OF FEMUR Right 4/11/2024    Procedure: ORIF, FRACTURE, FEMUR, INTERTROCHANTERIC - PP around [place]T;  Surgeon: Juan Luis Sanders MD;  Location: [facility] OR [address] [place] [address];  Service: Orthopedics;  Laterality: Right;    TOE SURGERY Right 2/2016    hammer toe surg       Time Tracking:     OT Date of Treatment: 04/12/24  OT Start Time: 0833  OT Stop Time: 0850  OT Total Time (min): 17 min    Billable Minutes:Evaluation 8  Therapeutic Activity 9    4/12/2024

## 2024-04-12 NOTE — PLAN OF CARE
Problem: Physical Therapy  Goal: Physical Therapy Goal  Description: PT goals until 4/30/24    1. Pt supine to sit with CGA-not met  2. Pt sit to supine with CGA-not met  3. Pt sit to stand with RW with TTWB R LE with CGA-not met  4. Pt to perform gait 40ft with TTWB R LE with minimal assist.-not met  5. Pt to transfer bed to/from bedside chair with RW with TTWB R LE with minimal assist.-not met  6. Pt to perform B LE exs in sitting or supine x 10 reps to strengthen B LE to improve functional mobility.-not met   7. PT to assess and write goals for steps when pt able to tolerate-not met  Outcome: Ongoing, Progressing   Pt's goals set and pt will benefit from skilled PT services to work towards improved functional mobility including: bed mobility, transfers, and gait. Morenita Farris PT  4/12/2024

## 2024-04-12 NOTE — PROGRESS NOTES
[facility] - [facility]  Orthopedics  Progress Note    Attg Note:  Patient seen and examined.  I agree with the resident's assessment and plan.  Doing well postop day 1.  Pain well controlled.  Out of bed to chair with physical therapy.  Continue TT WB right lower extremity x4 weeks.      Juan Luis Sanders MD    Patient Name: Blayne Chavis  MRN: [id number]  Admission Date: 4/9/2024  Hospital Length of Stay: 3 days  Attending Provider: Jimbo Dixon MD  Primary Care Provider: Jon Baca MD  Follow-up For: Procedure(s) (LRB):  ORIF, FRACTURE, FEMUR, INTERTROCHANTERIC - PP around EVERETT (Right)    Post-Operative Day: 1 Day Post-Op  Subjective:     Principal Problem:Periprosthetic fracture around internal prosthetic hip joint    Principal Orthopedic Problem: same as above    Interval History: Patient seen and examined at bedside. NAEON. Patient doing well. Pain well controlled. Anticipate working with PT today      Review of patient's allergies indicates:   Allergen Reactions    Sulfa (sulfonamide antibiotics) Other (See Comments)       Current Facility-Administered Medications   Medication    0.9%  NaCl infusion (for blood administration)    acetaminophen tablet 1,000 mg    aluminum-magnesium hydroxide-simethicone 200-200-20 mg/5 mL suspension 30 mL    amLODIPine tablet 10 mg    apixaban tablet 2.5 mg    atorvastatin tablet 40 mg    bisacodyL suppository 10 mg    celecoxib capsule 200 mg    dextrose 10% bolus 125 mL 125 mL    dextrose 10% bolus 250 mL 250 mL    glucagon (human recombinant) injection 1 mg    glucose chewable tablet 16 g    glucose chewable tablet 24 g    glycerin adult suppository 1 suppository    insulin aspart U-100 pen 0-5 Units    melatonin tablet 6 mg    methocarbamoL tablet 500 mg    mupirocin 2 % ointment    naloxone 0.4 mg/mL injection 0.02 mg    ondansetron injection 4 mg    oxyCODONE immediate release tablet 5 mg    polyethylene glycol packet 17 g    ROPIvacaine (PF) 2 mg/ml (0.2%)  "solution    senna-docusate 8.6-50 mg per tablet 1 tablet    simethicone chewable tablet 80 mg    sodium chloride 0.9% flush 1-10 mL    sodium chloride 0.9% flush 10 mL    tamsulosin 24 hr capsule 0.4 mg     Objective:     Vital Signs (Most Recent):  Temp: 97.7 °F (36.5 °C) (04/12/24 0409)  Pulse: 80 (04/12/24 0409)  Resp: 18 (04/12/24 0409)  BP: 139/72 (04/12/24 0409)  SpO2: (!) 92 % (04/12/24 0409) Vital Signs (24h Range):  Temp:  [97.5 °F (36.4 °C)-97.9 °F (36.6 °C)] 97.7 °F (36.5 °C)  Pulse:  [74-89] 80  Resp:  [11-21] 18  SpO2:  [90 %-99 %] 92 %  BP: (111-139)/(59-72) 139/72     Weight: 97.5 kg (215 lb)     Body mass index is 29.99 kg/m².      Intake/Output Summary (Last 24 hours) at 4/12/2024 0723  Last data filed at 4/11/2024 2317  Gross per 24 hour   Intake 2500 ml   Output 1845 ml   Net 655 ml         Ortho/SPM Exam  AAOx4  NAD  Reg rate  No increased WOB    RLE:  Dressing c/d/i  SILT T/SP/DP/Block/Sa  Motor intact T/SP/DP  WWP extremities  FCDs in place and functioning       Significant Labs: CBC:   Recent Labs   Lab 04/11/24  1145 04/11/24  1553 04/12/24  0527   WBC 4.89 4.79 8.05   HGB 11.3* 11.4* 10.4*   HCT 34.1* 34.6* 31.1*   * 146* 159       CMP:   Recent Labs   Lab 04/11/24  1553 04/12/24  0527    136   K 4.3 4.1    107   CO2 23 21*   * 155*   BUN 18 18   CREATININE 0.7 0.7   CALCIUM 7.9* 8.2*   ANIONGAP 10 8       Coagulation: No results for input(s): "LABPROT", "INR", "APTT" in the last 48 hours.  All pertinent labs within the past 24 hours have been reviewed.    Significant Imaging: I have reviewed all pertinent imaging results/findings.  Assessment/Plan:     * Periprosthetic fracture around internal prosthetic right hip joint  Blayne Chavis is a 73 y.o. male w/PMH of R-EVERETT (2014, Dr. Sanders), T2DM, YARELI, GERD, prior CVA with residual right-sided weakness, presenting as transfer with a right [place] B1 periprosthetic proximal femur fracture. Closed, neurovascularly " intact, and without any other musculoskeletal injuries on physical exam. Now s/p ORIF right proximal femur periprosthetic fracture on 4/11/24. Doing well.    Pain control: multimodal, PNC per APS  PT/OT: TTWB RLE  DVT PPx: Eliquis, SCDs at all times when not ambulating  Abx: postop Ancef  Labs: Hb 10.4  Diaz: remove POD1    Dispo: f/u PT recs           Ford Salinas MD  Orthopedics  [facility] - Surgery

## 2024-04-13 PROBLEM — E83.39 HYPOPHOSPHATEMIA: Status: ACTIVE | Noted: 2024-04-13

## 2024-04-13 LAB
ANION GAP SERPL CALC-SCNC: 6 MMOL/L (ref 8–16)
BASOPHILS # BLD AUTO: 0.01 K/UL (ref 0–0.2)
BASOPHILS NFR BLD: 0.2 % (ref 0–1.9)
BUN SERPL-MCNC: 12 MG/DL (ref 8–23)
CALCIUM SERPL-MCNC: 8.4 MG/DL (ref 8.7–10.5)
CHLORIDE SERPL-SCNC: 104 MMOL/L (ref 95–110)
CO2 SERPL-SCNC: 27 MMOL/L (ref 23–29)
CREAT SERPL-MCNC: 0.7 MG/DL (ref 0.5–1.4)
DIFFERENTIAL METHOD BLD: ABNORMAL
EOSINOPHIL # BLD AUTO: 0 K/UL (ref 0–0.5)
EOSINOPHIL NFR BLD: 0 % (ref 0–8)
ERYTHROCYTE [DISTWIDTH] IN BLOOD BY AUTOMATED COUNT: 12.6 % (ref 11.5–14.5)
EST. GFR  (NO RACE VARIABLE): >60 ML/MIN/1.73 M^2
GLUCOSE SERPL-MCNC: 118 MG/DL (ref 70–110)
HCT VFR BLD AUTO: 33 % (ref 40–54)
HGB BLD-MCNC: 10.9 G/DL (ref 14–18)
IMM GRANULOCYTES # BLD AUTO: 0.03 K/UL (ref 0–0.04)
IMM GRANULOCYTES NFR BLD AUTO: 0.5 % (ref 0–0.5)
LYMPHOCYTES # BLD AUTO: 1.4 K/UL (ref 1–4.8)
LYMPHOCYTES NFR BLD: 20.9 % (ref 18–48)
MAGNESIUM SERPL-MCNC: 2.1 MG/DL (ref 1.6–2.6)
MCH RBC QN AUTO: 29.1 PG (ref 27–31)
MCHC RBC AUTO-ENTMCNC: 33 G/DL (ref 32–36)
MCV RBC AUTO: 88 FL (ref 82–98)
MONOCYTES # BLD AUTO: 0.6 K/UL (ref 0.3–1)
MONOCYTES NFR BLD: 9.3 % (ref 4–15)
NEUTROPHILS # BLD AUTO: 4.5 K/UL (ref 1.8–7.7)
NEUTROPHILS NFR BLD: 69.1 % (ref 38–73)
NRBC BLD-RTO: 0 /100 WBC
PHOSPHATE SERPL-MCNC: 2.1 MG/DL (ref 2.7–4.5)
PLATELET # BLD AUTO: 173 K/UL (ref 150–450)
PMV BLD AUTO: 10.2 FL (ref 9.2–12.9)
POCT GLUCOSE: 115 MG/DL (ref 70–110)
POCT GLUCOSE: 118 MG/DL (ref 70–110)
POCT GLUCOSE: 136 MG/DL (ref 70–110)
POTASSIUM SERPL-SCNC: 3.7 MMOL/L (ref 3.5–5.1)
RBC # BLD AUTO: 3.75 M/UL (ref 4.6–6.2)
SODIUM SERPL-SCNC: 137 MMOL/L (ref 136–145)
WBC # BLD AUTO: 6.47 K/UL (ref 3.9–12.7)

## 2024-04-13 PROCEDURE — 83735 ASSAY OF MAGNESIUM: CPT | Performed by: STUDENT IN AN ORGANIZED HEALTH CARE EDUCATION/TRAINING PROGRAM

## 2024-04-13 PROCEDURE — 25000003 PHARM REV CODE 250: Performed by: STUDENT IN AN ORGANIZED HEALTH CARE EDUCATION/TRAINING PROGRAM

## 2024-04-13 PROCEDURE — 36415 COLL VENOUS BLD VENIPUNCTURE: CPT | Performed by: STUDENT IN AN ORGANIZED HEALTH CARE EDUCATION/TRAINING PROGRAM

## 2024-04-13 PROCEDURE — 97116 GAIT TRAINING THERAPY: CPT | Mod: CQ

## 2024-04-13 PROCEDURE — 25000003 PHARM REV CODE 250

## 2024-04-13 PROCEDURE — 84100 ASSAY OF PHOSPHORUS: CPT | Performed by: STUDENT IN AN ORGANIZED HEALTH CARE EDUCATION/TRAINING PROGRAM

## 2024-04-13 PROCEDURE — 99231 SBSQ HOSP IP/OBS SF/LOW 25: CPT | Mod: GC,,, | Performed by: ANESTHESIOLOGY

## 2024-04-13 PROCEDURE — 85025 COMPLETE CBC W/AUTO DIFF WBC: CPT | Performed by: STUDENT IN AN ORGANIZED HEALTH CARE EDUCATION/TRAINING PROGRAM

## 2024-04-13 PROCEDURE — 97530 THERAPEUTIC ACTIVITIES: CPT | Mod: CQ

## 2024-04-13 PROCEDURE — 21400001 HC TELEMETRY ROOM

## 2024-04-13 PROCEDURE — 25000003 PHARM REV CODE 250: Performed by: INTERNAL MEDICINE

## 2024-04-13 PROCEDURE — 80048 BASIC METABOLIC PNL TOTAL CA: CPT | Performed by: STUDENT IN AN ORGANIZED HEALTH CARE EDUCATION/TRAINING PROGRAM

## 2024-04-13 RX ORDER — METHOCARBAMOL 750 MG/1
750 TABLET, FILM COATED ORAL EVERY 8 HOURS
Status: DISCONTINUED | OUTPATIENT
Start: 2024-04-13 | End: 2024-04-14 | Stop reason: HOSPADM

## 2024-04-13 RX ORDER — PREGABALIN 75 MG/1
75 CAPSULE ORAL NIGHTLY
Status: DISCONTINUED | OUTPATIENT
Start: 2024-04-13 | End: 2024-04-14 | Stop reason: HOSPADM

## 2024-04-13 RX ORDER — SODIUM,POTASSIUM PHOSPHATES 280-250MG
2 POWDER IN PACKET (EA) ORAL ONCE
Status: COMPLETED | OUTPATIENT
Start: 2024-04-13 | End: 2024-04-13

## 2024-04-13 RX ORDER — METHOCARBAMOL 750 MG/1
750 TABLET, FILM COATED ORAL EVERY 6 HOURS
Status: DISCONTINUED | OUTPATIENT
Start: 2024-04-13 | End: 2024-04-13

## 2024-04-13 RX ADMIN — BISACODYL 10 MG: 10 SUPPOSITORY RECTAL at 09:04

## 2024-04-13 RX ADMIN — ACETAMINOPHEN 1000 MG: 500 TABLET ORAL at 05:04

## 2024-04-13 RX ADMIN — DOCUSATE SODIUM AND SENNOSIDES 1 TABLET: 8.6; 5 TABLET, FILM COATED ORAL at 09:04

## 2024-04-13 RX ADMIN — METHOCARBAMOL 750 MG: 750 TABLET ORAL at 01:04

## 2024-04-13 RX ADMIN — MUPIROCIN: 20 OINTMENT TOPICAL at 09:04

## 2024-04-13 RX ADMIN — ACETAMINOPHEN 1000 MG: 500 TABLET ORAL at 01:04

## 2024-04-13 RX ADMIN — METHOCARBAMOL 750 MG: 750 TABLET ORAL at 10:04

## 2024-04-13 RX ADMIN — ATORVASTATIN CALCIUM 40 MG: 40 TABLET, FILM COATED ORAL at 09:04

## 2024-04-13 RX ADMIN — METHOCARBAMOL 500 MG: 500 TABLET ORAL at 12:04

## 2024-04-13 RX ADMIN — AMLODIPINE BESYLATE 10 MG: 10 TABLET ORAL at 09:04

## 2024-04-13 RX ADMIN — POTASSIUM & SODIUM PHOSPHATES POWDER PACK 280-160-250 MG 2 PACKET: 280-160-250 PACK at 09:04

## 2024-04-13 RX ADMIN — TRAZODONE HYDROCHLORIDE 25 MG: 50 TABLET ORAL at 08:04

## 2024-04-13 RX ADMIN — CELECOXIB 200 MG: 200 CAPSULE ORAL at 09:04

## 2024-04-13 RX ADMIN — POLYETHYLENE GLYCOL 3350 17 G: 17 POWDER, FOR SOLUTION ORAL at 09:04

## 2024-04-13 RX ADMIN — OXYCODONE 5 MG: 5 TABLET ORAL at 01:04

## 2024-04-13 RX ADMIN — TAMSULOSIN HYDROCHLORIDE 0.4 MG: 0.4 CAPSULE ORAL at 09:04

## 2024-04-13 RX ADMIN — APIXABAN 2.5 MG: 2.5 TABLET, FILM COATED ORAL at 08:04

## 2024-04-13 RX ADMIN — APIXABAN 2.5 MG: 2.5 TABLET, FILM COATED ORAL at 09:04

## 2024-04-13 RX ADMIN — DOCUSATE SODIUM AND SENNOSIDES 1 TABLET: 8.6; 5 TABLET, FILM COATED ORAL at 08:04

## 2024-04-13 RX ADMIN — PREGABALIN 75 MG: 75 CAPSULE ORAL at 08:04

## 2024-04-13 RX ADMIN — METHOCARBAMOL 500 MG: 500 TABLET ORAL at 05:04

## 2024-04-13 NOTE — PT/OT/SLP PROGRESS
Physical Therapy Treatment    Patient Name:  Blayne Chavis   MRN:  [id number]    Recommendations:     Discharge Recommendations: High Intensity Therapy (pt has a history of CVA with R sided weakness, his wife works outside the home, his weight bearing status is TTWB R LE, and he has 7 steps to enter his home)  Discharge Equipment Recommendations: bedside commode  Barriers to discharge:  current level of assistance required    Assessment:     Blayne Chavis is a 73 y.o. male admitted with a medical diagnosis of Periprosthetic fracture around internal prosthetic hip joint.  He presents with the following impairments/functional limitations: weakness, impaired endurance, impaired self care skills, impaired functional mobility, gait instability, decreased lower extremity function, decreased safety awareness, pain, decreased ROM, orthopedic precautions Pt tolerated treatment session well today. Patient remains appropriate for continued skilled services within the acute environment and goals remain appropriate.   .    Rehab Prognosis: Good; patient would benefit from acute skilled PT services to address these deficits and reach maximum level of function.    Recent Surgery: Procedure(s) (LRB):  ORIF, FRACTURE, FEMUR, INTERTROCHANTERIC - PP around EVERETT (Right) 2 Days Post-Op    Plan:     During this hospitalization, patient to be seen daily to address the identified rehab impairments via gait training, therapeutic activities, therapeutic exercises, neuromuscular re-education and progress toward the following goals:    Plan of Care Expires:  05/12/24    Subjective     Chief Complaint: R LE pain   Patient/Family Comments/goals: Pt agreeable to PT   Pain/Comfort:  Pain Rating 1:  (not rated)  Location - Side 1: Right  Location - Orientation 1: generalized  Location 1: leg  Pain Addressed 1: Reposition, Distraction  Pain Rating Post-Intervention 1:  (not rated)      Objective:     Communicated with RN prior to session.   Patient found supine with perineural catheter, telemetry, oxygen upon PT entry to room.     General Precautions: Standard, fall  Orthopedic Precautions: RLE toe touch weight bearing  Braces: N/A  Respiratory Status:  Initially no oxygen, oxygen donned after session due to mild SOB      Functional Mobility:  Bed Mobility:     Supine to Sit: minimum assistance    Transfers:     Sit to Stand x 2:  minimum assistance with rolling walker  1st stand pt was able to stand for ~ 1-2 minutes with CGA  Pt was able to maintain TTWB   2nd stand pt t/f'ed to chair   Gait: 4 ft to chair CGA using RW   Pt was able to maintain WB status by hopping to bedside chair    Pt performed 10 repetitions of seated B LE exercises consisting of: Marching, LAQ, ABD/ADD, and AP          AM-PAC 6 CLICK MOBILITY  Turning over in bed (including adjusting bedclothes, sheets and blankets)?: 3  Sitting down on and standing up from a chair with arms (e.g., wheelchair, bedside commode, etc.): 3  Moving from lying on back to sitting on the side of the bed?: 3  Moving to and from a bed to a chair (including a wheelchair)?: 3  Need to walk in hospital room?: 3  Climbing 3-5 steps with a railing?: 1  Basic Mobility Total Score: 16       Treatment & Education:  Therapist provided instruction and educated for safety during transfers and gait training. As well as proper body mechanics, energy conservation, and fall prevention strategies during tasks listed above, and the effects of prolonged immobility and the importance of performing EOB/OOB activity and exercises to promote healing and reduce recovery time.       Patient left up in chair with all lines intact, call button in reach, and RN notified..    GOALS:   Multidisciplinary Problems       Physical Therapy Goals          Problem: Physical Therapy    Goal Priority Disciplines Outcome Goal Variances Interventions   Physical Therapy Goal     PT, PT/OT Ongoing, Progressing     Description: PT goals until  4/30/24    1. Pt supine to sit with CGA-not met  2. Pt sit to supine with CGA-not met  3. Pt sit to stand with RW with TTWB R LE with CGA-not met  4. Pt to perform gait 40ft with TTWB R LE with minimal assist.-not met  5. Pt to transfer bed to/from bedside chair with RW with TTWB R LE with minimal assist.-not met  6. Pt to perform B LE exs in sitting or supine x 10 reps to strengthen B LE to improve functional mobility.-not met   7. PT to assess and write goals for steps when pt able to tolerate-not met                       Time Tracking:     PT Received On: 04/13/24  PT Start Time: 1128     PT Stop Time: 1156  PT Total Time (min): 28 min     Billable Minutes: Gait Training 12 and Therapeutic Activity 16    Treatment Type: Treatment  PT/PTA: PTA     Number of PTA visits since last PT visit: 1 04/13/2024

## 2024-04-13 NOTE — ANESTHESIA POST-OP PAIN MANAGEMENT
Acute Pain Service Progress Note    Blayne Chavis is a 73 y.o., male, [id number].    Surgery:  ORIF, FRACTURE, FEMUR, INTERTROCHANTERIC - PP around EVERETT (Right: Hip)      Post Op Day #: 2     Catheter type: perineural  SIFI     Infusion type: Ropivacaine 0.2%  15mL/3hr basal    Problem List:    Active Hospital Problems    Diagnosis  POA    *Periprosthetic fracture around internal prosthetic right hip joint [M97.8XXA, Z96.649]  Not Applicable    Slow transit constipation [K59.01]  Yes    Preoperative examination [Z01.818]  Not Applicable    Controlled type 2 diabetes mellitus without complication, without long-term current use of insulin [E11.9]  Yes    DNR (do not resuscitate) [Z66]  Yes    History of stroke [Z86.73]  Not Applicable     Chronic    Pure hypercholesterolemia [E78.00]  Yes    Primary hypertension [I10]  Yes      Resolved Hospital Problems   No resolved problems to display.       Subjective:     General appearance of alert, oriented, no complaints   Pain with rest: 2    Numbers   Pain with movement: 5    Numbers   Side Effects    1. Pruritis No    2. Nausea No    3. Motor Blockade No, 0=Ability to raise lower extremities off bed    4. Sedation No, 1=awake and alert    Objective:        Catheter site clean, dry, intact         Vitals   Vitals:    04/13/24 0336   BP: (!) 140/77   Pulse: 86   Resp: 16   Temp: 36.6 °C (97.8 °F)        Labs    No results displayed because visit has over 200 results.           Meds   Current Facility-Administered Medications   Medication Dose Route Frequency Provider Last Rate Last Admin    0.9%  NaCl infusion (for blood administration)   Intravenous Q24H PRN Mariposa Argueta MD        acetaminophen tablet 1,000 mg  1,000 mg Oral Q6H Reuben Castrejon MD   1,000 mg at 04/13/24 0544    [START ON 4/14/2024] acetaminophen tablet 1,000 mg  1,000 mg Oral Q8H Efrain Cornejo MD        aluminum-magnesium hydroxide-simethicone 200-200-20 mg/5 mL suspension 30 mL  30 mL Oral  QID PRN Reuben Castrejon MD        amLODIPine tablet 10 mg  10 mg Oral Daily Reuben Castrejon MD   10 mg at 04/12/24 0930    apixaban tablet 2.5 mg  2.5 mg Oral BID Ford Salinas MD   2.5 mg at 04/12/24 2130    atorvastatin tablet 40 mg  40 mg Oral Daily Reuben Castrejon MD   40 mg at 04/12/24 0930    bisacodyL suppository 10 mg  10 mg Rectal Daily PRN Reuben Castrejon MD   10 mg at 04/10/24 0005    celecoxib capsule 200 mg  200 mg Oral Daily Ford Salinas MD   200 mg at 04/12/24 0929    dextrose 10% bolus 125 mL 125 mL  12.5 g Intravenous PRN Angela Tripathi MD        dextrose 10% bolus 250 mL 250 mL  25 g Intravenous PRN Angela Tripathi MD        glucagon (human recombinant) injection 1 mg  1 mg Intramuscular PRN Reuben Castrejon MD        glucose chewable tablet 16 g  16 g Oral PRN Reuben Castrejon MD        glucose chewable tablet 24 g  24 g Oral PRN Reuben Castrejon MD        glycerin adult suppository 1 suppository  1 suppository Rectal Once PRN Reuben Castrejon MD        insulin aspart U-100 pen 0-5 Units  0-5 Units Subcutaneous QID (AC + HS) PRN Angela Tripathi MD   1 Units at 04/11/24 2248    melatonin tablet 6 mg  6 mg Oral Nightly PRN Reuben Castrejon MD   6 mg at 04/11/24 2203    methocarbamoL tablet 500 mg  500 mg Oral Q6H Lisa Harrison, DO   500 mg at 04/13/24 0544    mupirocin 2 % ointment   Nasal BID Cale Fernandez MD   Given at 04/12/24 2130    naloxone 0.4 mg/mL injection 0.02 mg  0.02 mg Intravenous PRN Reuben Castrejon MD        ondansetron injection 4 mg  4 mg Intravenous Q12H PRN Reuben Castrejon MD        oxyCODONE immediate release tablet 5 mg  5 mg Oral Q6H PRN Lisa Brown MD   5 mg at 04/13/24 0140    polyethylene glycol packet 17 g  17 g Oral Daily Angela Tripathi MD   17 g at 04/12/24 0916    ROPIvacaine (PF) 2 mg/ml (0.2%) solution  0.1 mL/hr Perineural Continuous  Reuben Castrejon MD 0.1 mL/hr at 04/12/24 2238 0.1 mL/hr at 04/12/24 2238    senna-docusate 8.6-50 mg per tablet 1 tablet  1 tablet Oral BID Angela Tripathi MD   1 tablet at 04/12/24 2130    simethicone chewable tablet 80 mg  1 tablet Oral QID PRN Reuben Castrejon MD        sodium chloride 0.9% flush 1-10 mL  1-10 mL Intravenous Q12H PRN Reuben Castrejon MD        sodium chloride 0.9% flush 10 mL  10 mL Intravenous PRN Reuben Castrejon MD        tamsulosin 24 hr capsule 0.4 mg  0.4 mg Oral Daily Reuben Castrejon MD   0.4 mg at 04/12/24 0929            Assessment:     Pain control adequate    Plan:              Patient doing well, continue present treatment.     1.) Continue PNC at current rate. Potentially pause/pull tomorrow.  2.) Multimodal pain regimen; tylenol 1g q6h, robaxin 750mg q8h  3.) d/c: oxycodone 5mg q4h prn  4.) Limit narcotics as able.  5.) Start lyrica 75mg QHS to hep with pain and sleep

## 2024-04-13 NOTE — PROGRESS NOTES
[facility] Medicine  Progress Note    Patient Name: Blayne Chavsi  MRN: [id number]  Patient Class: IP- Inpatient   Admission Date: 4/9/2024  Length of Stay: 4 days  Attending Physician: Jimbo Dixon MD  Primary Care Provider: Jon Baca MD        Subjective:     Principal Problem:Periprosthetic fracture around internal prosthetic hip joint        HPI:  Blayne Chavis is a 73 y.o. male with history of CVA with residual mild right-sided weakness, history of R hip arthroplasty, HTN, HLD [facility].     He reports that when walking today, he stubbed his toe, causing him to fall to the ground.  He immediately noted right hip pain, however he thought this was due to just a strain, and continue to walk.  However, the pain did not resolve.  [facility].  He denies any presyncopal symptoms or loss of consciousness.  At baseline, he has mild residual right-sided weakness from his prior CVA but is able to ambulate without any assistive devices.    Reportedly XR showed R proximal trochanteric fracture with displacement and comminuted component. For this reason, he was transferred here due to need for Orthopedics.     Overview/Hospital Course:  [facility] with periprosthetic comminuted fracture of the intertrochanteric and greater trochanteric region of the right femur in patient with previous total hip arthroplasty in 2014. Orthopedics evauated and plan ORIF of right proximal femur to repair fracture. Patient started on multimodal pain medciations with scheduled Tylenol, Robaxin and Lyrica with Oxycodone IR po prn for breakthrough pain. Patient likely to go to OR on 4/11 for operative repair so started on Heparin 5000 units subcutaneous TID for DVT prophylaxis.     Interval History:   Patient with continued difficulty sleeping overnight. Pain well controlled. No bowel movement since  surgery. Suppository today. Continued on PNC. Phos replaced PO.      Review of Systems   Constitutional:  Negative for fever.   Respiratory:  Negative for cough and shortness of breath.    Cardiovascular:  Negative for chest pain.   Gastrointestinal:  Positive for constipation. Negative for abdominal pain and nausea.   Musculoskeletal:  Positive for arthralgias (Right hip).   Neurological:  Negative for dizziness.   Psychiatric/Behavioral:  Negative for agitation and confusion.      Objective:     Vital Signs (Most Recent):  Temp: 97.8 °F (36.6 °C) (04/13/24 0336)  Pulse: 86 (04/13/24 0336)  Resp: 16 (04/13/24 0336)  BP: (!) 140/77 (04/13/24 0336)  SpO2: (!) 92 % (04/13/24 0336) Vital Signs (24h Range):  Temp:  [97.6 °F (36.4 °C)-98 °F (36.7 °C)] 97.8 °F (36.6 °C)  Pulse:  [69-88] 86  Resp:  [16-18] 16  SpO2:  [91 %-94 %] 92 %  BP: (124-140)/(65-77) 140/77     Weight: 97.5 kg (215 lb)  Body mass index is 33.67 kg/m².    Intake/Output Summary (Last 24 hours) at 4/13/2024 0959  Last data filed at 4/13/2024 0930  Gross per 24 hour   Intake 120 ml   Output 750 ml   Net -630 ml         Physical Exam  Vitals and nursing note reviewed.   Constitutional:       General: He is awake. He is not in acute distress.     Appearance: Normal appearance. He is well-developed and normal weight. He is not ill-appearing.   Eyes:      Conjunctiva/sclera: Conjunctivae normal.   Cardiovascular:      Rate and Rhythm: Normal rate and regular rhythm.      Heart sounds: Normal heart sounds. No murmur heard.  Pulmonary:      Effort: Pulmonary effort is normal. No respiratory distress.      Breath sounds: Normal breath sounds. No wheezing.   Abdominal:      General: Abdomen is flat. Bowel sounds are normal. There is no distension.      Palpations: Abdomen is soft.      Tenderness: There is no abdominal tenderness.      Comments: PNC   Musculoskeletal:         General: Tenderness (right hip) present.      Right lower leg: No edema.      Left  lower leg: No edema.   Skin:     Findings: No erythema.   Neurological:      Mental Status: He is alert and oriented to person, place, and time.   Psychiatric:         Mood and Affect: Mood normal.         Behavior: Behavior normal. Behavior is cooperative.         Thought Content: Thought content normal.         Judgment: Judgment normal.             Significant Labs: All pertinent labs within the past 24 hours have been reviewed.  CBC:   Recent Labs   Lab 04/11/24  1553 04/12/24  0527 04/13/24  0542   WBC 4.79 8.05 6.47   HGB 11.4* 10.4* 10.9*   HCT 34.6* 31.1* 33.0*   * 159 173     CMP:   Recent Labs   Lab 04/11/24  1553 04/12/24  0527 04/13/24  0542    136 137   K 4.3 4.1 3.7    107 104   CO2 23 21* 27   * 155* 118*   BUN 18 18 12   CREATININE 0.7 0.7 0.7   CALCIUM 7.9* 8.2* 8.4*   ANIONGAP 10 8 6*       Significant Imaging: I have reviewed all pertinent imaging results/findings within the past 24 hours.    Assessment/Plan:      * Periprosthetic fracture around internal prosthetic right hip joint  [facility] with periprosthetic comminuted fracture of the intertrochanteric and greater trochanteric region of the right femur in patient with previous total hip arthroplasty in 2014.   Orthopedics evaluated and appreciate recs and plan ORIF of right proximal femur to repair fracture, OR on 4/11 for repair  Apixaban DVT prophylaxis post-op.    TTWB RLE post op  Anesthesia management pain post op with PNC and multi modals  PT/OT: High    Slow transit constipation  Patient noted on CT scan of pelvis on admit to have moderate fecal impaction  - Senakot 1 tablet po BID + Miralax 17 grams po daily   - Doculax suppository prn.       Hypophosphatemia  Patient has Abnormal Phosphorus: hypophosphatemia. Will continue to monitor electrolytes closely. Will replace the affected electrolytes and repeat labs to be done after interventions completed. The patient's  phosphorus results have been reviewed and are listed below.  Recent Labs   Lab 04/13/24  0542   PHOS 2.1*        DNR (do not resuscitate)  Advance Care Planning  I discussed code status with patient on admission. he states that he would never want CPR or intubation/mechanical ventilation in the event of cardiopulmonary arrest, in agreement with DNR status. His wife is his surrogate decision maker, and he reports that he has discussed this with her.        Controlled type 2 diabetes mellitus without complication, without long-term current use of insulin  Patient's FSGs are controlled on current medication regimen. Patient on no meds at home. No recent HgA1C so will check one on this admit.  Last A1c reviewed-   Lab Results   Component Value Date    HGBA1C 5.7 06/30/2010     Current correctional scale  Low  Start anti-hyperglycemic dose as follows-   Antihyperglycemics (From admission, onward)      Start     Stop Route Frequency Ordered    04/10/24 1805  insulin aspart U-100 pen 0-5 Units         -- SubQ Before meals & nightly PRN 04/10/24 1706          Hold Oral hypoglycemics while patient is in the hospital.  Monitor blood sugars with meals and at bedtime in hospital.  Target blood sugars 140-180 in hospital.     Preoperative examination  Patient undergoing non-emergent non-cardiac surgery.  Patient does not have active cardiac problems   -baseline EKG ordered; to be followed up.   -reports chronic murmur which is demonstrated on exam without signs of decompensated CHF.   -no history of known CAD, LHC, or stents.   Patient undergoing intermediate risk surgery.  Functional Status: Patient has functional METs > or = 4  Revised cardiac risk index (RCRI) score is 1.         Other Issues:       Patient on long term anticoagulation: No      Recent coronary stenting: No    Patient with acceptable cardiac risk with (RCRI score of 1) going for intermediate risk surgery. No absolute contraindications to the proposed surgery  at this time. intermediate risk of post-op pulmonary complications.  intermediate risk of post-op delirium.   - Okay to proceed with planned surgery with no additional cardiac testing needed prior to surgery.    History of stroke  Chronic and controlled. Continue home Lipitor to treat. Hold Aspirin for surgery and resume post-op.       Pure hypercholesterolemia  Chronic and controlled. Continue home Lipitor po daily to treat.        Primary hypertension  Chronic, controlled. Latest blood pressure and vitals reviewed-     Temp:  [96.8 °F (36 °C)-99 °F (37.2 °C)]   Pulse:  [74-95]   Resp:  [11-21]   BP: (111-164)/(59-80)   SpO2:  [90 %-99 %] .   Home meds for hypertension were reviewed and noted below.   Hypertension Medications               amLODIPine (NORVASC) 10 MG tablet Take 1 tablet (10 mg total) by mouth once daily.    losartan (COZAAR) 25 MG tablet Take 1 tablet (25 mg total) by mouth once daily.            While in the hospital, will manage blood pressure as follows; Adjust home antihypertensive regimen as follows- Continue home Norvasc to treat HTN but hold Losartan for surgery and resume post-op as BP tolerates.    Will utilize p.r.n. blood pressure medication only if patient's blood pressure greater than 180/110 and he develops symptoms such as worsening chest pain or shortness of breath.      VTE Risk Mitigation (From admission, onward)           Ordered     apixaban tablet 2.5 mg  2 times daily         04/11/24 1049     Reason for no Mechanical VTE Prophylaxis  Once        Question:  Reasons:  Answer:  Risk of Bleeding    04/09/24 2115     IP VTE HIGH RISK PATIENT  Once         04/09/24 2115     Reason for No Pharmacological VTE Prophylaxis  Once        Question:  Reasons:  Answer:  Risk of Bleeding    04/09/24 2115     Place sequential compression device  Until discontinued         04/09/24 2113                    Discharge Planning   BHUPENDRA: 4/15/2024     Code Status: DNR   Is the patient medically ready  for discharge?: No    Reason for patient still in hospital (select all that apply): Patient trending condition, Laboratory test, Treatment, Consult recommendations, PT / OT recommendations, and Pending disposition  Discharge Plan A: Rehab                  Jimbo Dixon MD  Department of [facility] Medicine   [facility]

## 2024-04-13 NOTE — SUBJECTIVE & OBJECTIVE
Principal Problem:Periprosthetic fracture around internal prosthetic hip joint    Principal Orthopedic Problem: same as above    Interval History: Patient seen and examined at bedside. TAM. Patient doing well. Worked with PT yesterday.       Review of patient's allergies indicates:   Allergen Reactions    Sulfa (sulfonamide antibiotics) Other (See Comments)       Current Facility-Administered Medications   Medication Dose Route Frequency Provider Last Rate Last Admin    0.9%  NaCl infusion (for blood administration)   Intravenous Q24H PRN Mariposa Argueta MD        acetaminophen tablet 1,000 mg  1,000 mg Oral Q6H Reuben Castrejon MD   1,000 mg at 04/13/24 0544    [START ON 4/14/2024] acetaminophen tablet 1,000 mg  1,000 mg Oral Q8H Efrain Cornejo MD        aluminum-magnesium hydroxide-simethicone 200-200-20 mg/5 mL suspension 30 mL  30 mL Oral QID PRN Reuben Castrejon MD        amLODIPine tablet 10 mg  10 mg Oral Daily Reuben Castrejon MD   10 mg at 04/12/24 0930    apixaban tablet 2.5 mg  2.5 mg Oral BID Ford Salinas MD   2.5 mg at 04/12/24 2130    atorvastatin tablet 40 mg  40 mg Oral Daily Reuben Castrejon MD   40 mg at 04/12/24 0930    bisacodyL suppository 10 mg  10 mg Rectal Daily PRN Reuben Castrejon MD   10 mg at 04/10/24 0005    celecoxib capsule 200 mg  200 mg Oral Daily Ford Salinas MD   200 mg at 04/12/24 0929    dextrose 10% bolus 125 mL 125 mL  12.5 g Intravenous PRN Angela Tripathi MD        dextrose 10% bolus 250 mL 250 mL  25 g Intravenous PRN Angela Tripathi MD        glucagon (human recombinant) injection 1 mg  1 mg Intramuscular PRN Rebuen Castrejon MD        glucose chewable tablet 16 g  16 g Oral PRN Reuben Castrejon MD        glucose chewable tablet 24 g  24 g Oral PRN Reuben Castrejon MD        glycerin adult suppository 1 suppository  1 suppository Rectal Once PRN Reuben Castrejon MD        insulin  "aspart U-100 pen 0-5 Units  0-5 Units Subcutaneous QID (AC + HS) PRN Angela Tripathi MD   1 Units at 04/11/24 2248    melatonin tablet 6 mg  6 mg Oral Nightly PRN Reuben Castrejon MD   6 mg at 04/11/24 2203    methocarbamoL tablet 500 mg  500 mg Oral Q6H Elvis Harrisony,    500 mg at 04/13/24 0544    mupirocin 2 % ointment   Nasal BID Cale Fernandez MD   Given at 04/12/24 2130    naloxone 0.4 mg/mL injection 0.02 mg  0.02 mg Intravenous PRN Reuben Castrejon MD        ondansetron injection 4 mg  4 mg Intravenous Q12H PRN Reuben Castrejon MD        oxyCODONE immediate release tablet 5 mg  5 mg Oral Q6H PRN Lisa Brown MD   5 mg at 04/13/24 0140    polyethylene glycol packet 17 g  17 g Oral Daily Angela Tripathi MD   17 g at 04/12/24 0929    ROPIvacaine (PF) 2 mg/ml (0.2%) solution  0.1 mL/hr Perineural Continuous Reuben Castrejon MD 0.1 mL/hr at 04/12/24 2238 0.1 mL/hr at 04/12/24 2238    senna-docusate 8.6-50 mg per tablet 1 tablet  1 tablet Oral BID Angela Tripathi MD   1 tablet at 04/12/24 2130    simethicone chewable tablet 80 mg  1 tablet Oral QID PRN Reuben Castrejon MD        sodium chloride 0.9% flush 1-10 mL  1-10 mL Intravenous Q12H PRN Rueben Castrejon MD        sodium chloride 0.9% flush 10 mL  10 mL Intravenous PRN Reuben Castrejon MD        tamsulosin 24 hr capsule 0.4 mg  0.4 mg Oral Daily Reuben Castrejon MD   0.4 mg at 04/12/24 0929     Objective:     Vital Signs (Most Recent):  Temp: 97.8 °F (36.6 °C) (04/13/24 0336)  Pulse: 86 (04/13/24 0336)  Resp: 16 (04/13/24 0336)  BP: (!) 140/77 (04/13/24 0336)  SpO2: (!) 92 % (04/13/24 0336) Vital Signs (24h Range):  Temp:  [97.6 °F (36.4 °C)-98 °F (36.7 °C)] 97.8 °F (36.6 °C)  Pulse:  [69-88] 86  Resp:  [16-18] 16  SpO2:  [91 %-94 %] 92 %  BP: (124-140)/(65-77) 140/77     Weight: 97.5 kg (215 lb)  Height: 5' 7" (170.2 cm)  Body mass index is 33.67 " "kg/m².      Intake/Output Summary (Last 24 hours) at 4/13/2024 0545  Last data filed at 4/12/2024 1530  Gross per 24 hour   Intake --   Output 250 ml   Net -250 ml        Ortho/SPM Exam  AAOx4  NAD  Reg rate  No increased WOB    RLE:  Dressing c/d/i  SILT T/SP/DP/Block/Sa  Motor intact T/SP/DP  FCDs in place and functioning       Significant Labs: CBC:   Recent Labs   Lab 04/11/24  1145 04/11/24  1553 04/12/24  0527   WBC 4.89 4.79 8.05   HGB 11.3* 11.4* 10.4*   HCT 34.1* 34.6* 31.1*   * 146* 159     CMP:   Recent Labs   Lab 04/11/24  1553 04/12/24  0527    136   K 4.3 4.1    107   CO2 23 21*   * 155*   BUN 18 18   CREATININE 0.7 0.7   CALCIUM 7.9* 8.2*   ANIONGAP 10 8     Coagulation: No results for input(s): "LABPROT", "INR", "APTT" in the last 48 hours.  All pertinent labs within the past 24 hours have been reviewed.    Significant Imaging: I have reviewed all pertinent imaging results/findings.  "

## 2024-04-13 NOTE — NURSING
Nurses Note -- 4 Eyes      4/13/2024   6:15 PM      Skin assessed during: Daily Assessment      [x] No Altered Skin Integrity Present    []Prevention Measures Documented      [] Yes- Altered Skin Integrity Present or Discovered   [] LDA Added if Not in Epic (Describe Wound)   [] New Altered Skin Integrity was Present on Admit and Documented in LDA   [] Wound Image Taken    Wound Care Consulted? No    Attending Nurse:  Jocelyn FRANK RN    Second RN/Staff Member:   NATALIIA Segundo

## 2024-04-13 NOTE — ASSESSMENT & PLAN NOTE
Patient noted on CT scan of pelvis on admit to have moderate fecal impaction  - Senakot 1 tablet po BID + Miralax 17 grams po daily   - Doculax suppository prn.

## 2024-04-13 NOTE — NURSING
Nurses Note -- 4 Eyes      4/12/2024   7:15 PM      Skin assessed during: Daily Assessment      [x] No Altered Skin Integrity Present    []Prevention Measures Documented      [] Yes- Altered Skin Integrity Present or Discovered   [] LDA Added if Not in Epic (Describe Wound)   [] New Altered Skin Integrity was Present on Admit and Documented in LDA   [] Wound Image Taken    Wound Care Consulted? No    Attending Nurse:  Desi Mendez RN     Second RN/Staff Member:   FLORENCE Miguel

## 2024-04-13 NOTE — NURSING
Nurses Note -- 4 Eyes      4/13/2024   12:16 AM      Skin assessed during: Q Shift Change      [x] No Altered Skin Integrity Present    []Prevention Measures Documented      [] Yes- Altered Skin Integrity Present or Discovered   [] LDA Added if Not in Epic (Describe Wound)   [] New Altered Skin Integrity was Present on Admit and Documented in LDA   [] Wound Image Taken    Wound Care Consulted? No    Attending Nurse:  Lamont Devi RN/Staff Member:  DOM PCT

## 2024-04-13 NOTE — PROGRESS NOTES
[facility] - [facility]  Orthopedics  Progress Note    Patient Name: Blayne Chavis  MRN: [id number]  Admission Date: 4/9/2024  Hospital Length of Stay: 4 days  Attending Provider: Jimbo Dixon MD  Primary Care Provider: Jon Baca MD  Follow-up For: Procedure(s) (LRB):  ORIF, FRACTURE, FEMUR, INTERTROCHANTERIC - PP around [place] (Right)    Post-Operative Day: 2 Days Post-Op  Subjective:     Principal Problem:Periprosthetic fracture around internal prosthetic hip joint    Principal Orthopedic Problem: same as above    Interval History: Patient seen and examined at bedside. NAEON. Patient doing well. Worked with PT yesterday.       Review of patient's allergies indicates:   Allergen Reactions    Sulfa (sulfonamide antibiotics) Other (See Comments)       Current Facility-Administered Medications   Medication Dose Route Frequency Provider Last Rate Last Admin    0.9%  NaCl infusion (for blood administration)   Intravenous Q24H PRN Mariposa Argueta MD        acetaminophen tablet 1,000 mg  1,000 mg Oral Q6H Reuben Castrejon MD   1,000 mg at 04/13/24 0544    [START ON 4/14/2024] acetaminophen tablet 1,000 mg  1,000 mg Oral Q8H Efrain Cornejo MD        aluminum-magnesium hydroxide-simethicone 200-200-20 mg/5 mL suspension 30 mL  30 mL Oral QID PRN Reuben Castrejon MD        amLODIPine tablet 10 mg  10 mg Oral Daily Reuben Castrejon MD   10 mg at 04/12/24 0930    apixaban tablet 2.5 mg  2.5 mg Oral BID Ford Salinas MD   2.5 mg at 04/12/24 2130    atorvastatin tablet 40 mg  40 mg Oral Daily Reuben Castrejon MD   40 mg at 04/12/24 0930    bisacodyL suppository 10 mg  10 mg Rectal Daily PRN Reuben Castrejon MD   10 mg at 04/10/24 0005    celecoxib capsule 200 mg  200 mg Oral Daily Ford Salinas MD   200 mg at 04/12/24 0929    dextrose 10% bolus 125 mL 125 mL  12.5 g Intravenous PRN Angela Tripathi MD        dextrose 10% bolus 250 mL 250 mL  25 g Intravenous PRN Bhumi  Angela BURNETT MD        glucagon (human recombinant) injection 1 mg  1 mg Intramuscular PRN Reuben Castrejon MD        glucose chewable tablet 16 g  16 g Oral PRN Reuben Castrejon MD        glucose chewable tablet 24 g  24 g Oral PRN Reuben Castrejon MD        glycerin adult suppository 1 suppository  1 suppository Rectal Once PRN Reuben Castrejon MD        insulin aspart U-100 pen 0-5 Units  0-5 Units Subcutaneous QID (AC + HS) PRN Angela Tripathi MD   1 Units at 04/11/24 2248    melatonin tablet 6 mg  6 mg Oral Nightly PRN Reuben Castrejon MD   6 mg at 04/11/24 2203    methocarbamoL tablet 500 mg  500 mg Oral Q6H Lisa Harrison DO   500 mg at 04/13/24 0544    mupirocin 2 % ointment   Nasal BID Cale Fernandez MD   Given at 04/12/24 2130    naloxone 0.4 mg/mL injection 0.02 mg  0.02 mg Intravenous PRN Reuben Castrejon MD        ondansetron injection 4 mg  4 mg Intravenous Q12H PRN Reuben Castrejon MD        oxyCODONE immediate release tablet 5 mg  5 mg Oral Q6H PRN Lisa Brown MD   5 mg at 04/13/24 0140    polyethylene glycol packet 17 g  17 g Oral Daily Angela Tripathi MD   17 g at 04/12/24 0929    ROPIvacaine (PF) 2 mg/ml (0.2%) solution  0.1 mL/hr Perineural Continuous Reuben Castrejon MD 0.1 mL/hr at 04/12/24 2238 0.1 mL/hr at 04/12/24 2238    senna-docusate 8.6-50 mg per tablet 1 tablet  1 tablet Oral BID Angela Tripathi MD   1 tablet at 04/12/24 2130    simethicone chewable tablet 80 mg  1 tablet Oral QID PRN Reuben Castrejon MD        sodium chloride 0.9% flush 1-10 mL  1-10 mL Intravenous Q12H PRN Reuben Castrejon MD        sodium chloride 0.9% flush 10 mL  10 mL Intravenous PRN Reuben Castrejon MD        tamsulosin 24 hr capsule 0.4 mg  0.4 mg Oral Daily Reuben Castrejon MD   0.4 mg at 04/12/24 0929     Objective:     Vital Signs (Most Recent):  Temp: 97.8 °F (36.6 °C) (04/13/24  "0336)  Pulse: 86 (04/13/24 0336)  Resp: 16 (04/13/24 0336)  BP: (!) 140/77 (04/13/24 0336)  SpO2: (!) 92 % (04/13/24 0336) Vital Signs (24h Range):  Temp:  [97.6 °F (36.4 °C)-98 °F (36.7 °C)] 97.8 °F (36.6 °C)  Pulse:  [69-88] 86  Resp:  [16-18] 16  SpO2:  [91 %-94 %] 92 %  BP: (124-140)/(65-77) 140/77     Weight: 97.5 kg (215 lb)  Height: 5' 7" (170.2 cm)  Body mass index is 33.67 kg/m².      Intake/Output Summary (Last 24 hours) at 4/13/2024 0545  Last data filed at 4/12/2024 1530  Gross per 24 hour   Intake --   Output 250 ml   Net -250 ml        Ortho/SPM Exam  AAOx4  NAD  Reg rate  No increased WOB    RLE:  Dressing c/d/i  SILT T/SP/DP/Block/Sa  Motor intact T/SP/DP  FCDs in place and functioning       Significant Labs: CBC:   Recent Labs   Lab 04/11/24  1145 04/11/24  1553 04/12/24  0527   WBC 4.89 4.79 8.05   HGB 11.3* 11.4* 10.4*   HCT 34.1* 34.6* 31.1*   * 146* 159     CMP:   Recent Labs   Lab 04/11/24  1553 04/12/24  0527    136   K 4.3 4.1    107   CO2 23 21*   * 155*   BUN 18 18   CREATININE 0.7 0.7   CALCIUM 7.9* 8.2*   ANIONGAP 10 8     Coagulation: No results for input(s): "LABPROT", "INR", "APTT" in the last 48 hours.  All pertinent labs within the past 24 hours have been reviewed.    Significant Imaging: I have reviewed all pertinent imaging results/findings.  Assessment/Plan:     * Periprosthetic fracture around internal prosthetic right hip joint  Blayne Chavis is a 73 y.o. male w/PMH of R-EVERETT (2014, Dr. Sanders), T2DM, YARELI, GERD, prior CVA with residual right-sided weakness, presenting as transfer with a right [place] B1 periprosthetic proximal femur fracture. Closed, neurovascularly intact, and without any other musculoskeletal injuries on physical exam. Now s/p ORIF right proximal femur periprosthetic fracture on 4/11/24. Doing well.    Pain control: multimodal, PNC per APS  PT/OT: TTWB RLE  DVT PPx: Eliquis, SCDs at all times when not ambulating  Abx: postop " [place]: none    Dispo:Recommend High intensity therapy    Kar Altamirano MD  Orthopedics  [facility] - Surgery

## 2024-04-13 NOTE — SUBJECTIVE & OBJECTIVE
Interval History:   Patient with continued difficulty sleeping overnight. Pain well controlled. No bowel movement since surgery. Suppository today. Continued on PNC. Phos replaced PO.      Review of Systems   Constitutional:  Negative for fever.   Respiratory:  Negative for cough and shortness of breath.    Cardiovascular:  Negative for chest pain.   Gastrointestinal:  Positive for constipation. Negative for abdominal pain and nausea.   Musculoskeletal:  Positive for arthralgias (Right hip).   Neurological:  Negative for dizziness.   Psychiatric/Behavioral:  Negative for agitation and confusion.      Objective:     Vital Signs (Most Recent):  Temp: 97.8 °F (36.6 °C) (04/13/24 0336)  Pulse: 86 (04/13/24 0336)  Resp: 16 (04/13/24 0336)  BP: (!) 140/77 (04/13/24 0336)  SpO2: (!) 92 % (04/13/24 0336) Vital Signs (24h Range):  Temp:  [97.6 °F (36.4 °C)-98 °F (36.7 °C)] 97.8 °F (36.6 °C)  Pulse:  [69-88] 86  Resp:  [16-18] 16  SpO2:  [91 %-94 %] 92 %  BP: (124-140)/(65-77) 140/77     Weight: 97.5 kg (215 lb)  Body mass index is 33.67 kg/m².    Intake/Output Summary (Last 24 hours) at 4/13/2024 0959  Last data filed at 4/13/2024 0930  Gross per 24 hour   Intake 120 ml   Output 750 ml   Net -630 ml         Physical Exam  Vitals and nursing note reviewed.   Constitutional:       General: He is awake. He is not in acute distress.     Appearance: Normal appearance. He is well-developed and normal weight. He is not ill-appearing.   Eyes:      Conjunctiva/sclera: Conjunctivae normal.   Cardiovascular:      Rate and Rhythm: Normal rate and regular rhythm.      Heart sounds: Normal heart sounds. No murmur heard.  Pulmonary:      Effort: Pulmonary effort is normal. No respiratory distress.      Breath sounds: Normal breath sounds. No wheezing.   Abdominal:      General: Abdomen is flat. Bowel sounds are normal. There is no distension.      Palpations: Abdomen is soft.      Tenderness: There is no abdominal tenderness.       Comments: [facility]   Musculoskeletal:         General: Tenderness (right hip) present.      Right lower leg: No edema.      Left lower leg: No edema.   Skin:     Findings: No erythema.   Neurological:      Mental Status: He is alert and oriented to person, place, and time.   Psychiatric:         Mood and Affect: Mood normal.         Behavior: Behavior normal. Behavior is cooperative.         Thought Content: Thought content normal.         Judgment: Judgment normal.             Significant Labs: All pertinent labs within the past 24 hours have been reviewed.  CBC:   Recent Labs   Lab 04/11/24  1553 04/12/24  0527 04/13/24  0542   WBC 4.79 8.05 6.47   HGB 11.4* 10.4* 10.9*   HCT 34.6* 31.1* 33.0*   * 159 173     CMP:   Recent Labs   Lab 04/11/24  1553 04/12/24  0527 04/13/24  0542    136 137   K 4.3 4.1 3.7    107 104   CO2 23 21* 27   * 155* 118*   BUN 18 18 12   CREATININE 0.7 0.7 0.7   CALCIUM 7.9* 8.2* 8.4*   ANIONGAP 10 8 6*       Significant Imaging: I have reviewed all pertinent imaging results/findings within the past 24 hours.

## 2024-04-13 NOTE — CONSULTS
Inpatient consult to Physical Medicine Rehab  Consult performed by: Jennifer Cabrera NP  Consult ordered by: Jimbo Dixon MD  Reason for consult: Rehab      Consult received.     KIKE Baez, FNP-C  Physical Medicine & Rehabilitation   04/13/2024

## 2024-04-13 NOTE — PLAN OF CARE
Problem: Adult Inpatient Plan of Care  Goal: Plan of Care Review  Outcome: Ongoing, Progressing  Goal: Patient-Specific Goal (Individualized)  Outcome: Ongoing, Progressing  Goal: Absence of Hospital-Acquired Illness or Injury  Outcome: Ongoing, Progressing  Goal: Optimal Comfort and Wellbeing  Outcome: Ongoing, Progressing  Goal: Readiness for Transition of Care  Outcome: Ongoing, Progressing     Problem: Adjustment to Injury (Hip Fracture Medical Management)  Goal: Optimal Coping with Change in Health Status  Outcome: Ongoing, Progressing    Patient VSS, AAOX4,  NADN at this time. Bed in lowest position, call light in reach along with personal items. Plan of care ongoing.

## 2024-04-13 NOTE — ASSESSMENT & PLAN NOTE
Blayne Chavis is a 73 y.o. male w/PMH of R-[place]ETT (2014, Dr. Sanders), T2DM, YARELI, GERD, prior CVA with residual right-sided weakness, presenting as transfer with a right [place] B1 periprosthetic proximal femur fracture. Closed, neurovascularly intact, and without any other musculoskeletal injuries on physical exam. Now s/p ORIF right proximal femur periprosthetic fracture on 4/11/24. Doing well.    Pain control: multimodal, PNC per APS  PT/OT: TTWB RLE  DVT PPx: Eliquis, SCDs at all times when not ambulating  Abx: postop Ancef  Emily: none    Dispo:Recommend High intensity therapy

## 2024-04-13 NOTE — ASSESSMENT & PLAN NOTE
Patient has Abnormal Phosphorus: hypophosphatemia. Will continue to monitor electrolytes closely. Will replace the affected electrolytes and repeat labs to be done after interventions completed. The patient's phosphorus results have been reviewed and are listed below.  Recent Labs   Lab 04/13/24  0542   PHOS 2.1*

## 2024-04-14 VITALS
SYSTOLIC BLOOD PRESSURE: 138 MMHG | TEMPERATURE: 98 F | OXYGEN SATURATION: 94 % | DIASTOLIC BLOOD PRESSURE: 76 MMHG | RESPIRATION RATE: 16 BRPM | BODY MASS INDEX: 33.74 KG/M2 | HEART RATE: 87 BPM | HEIGHT: 67 IN | WEIGHT: 215 LBS

## 2024-04-14 LAB
ANION GAP SERPL CALC-SCNC: 10 MMOL/L (ref 8–16)
BASOPHILS # BLD AUTO: 0.01 K/UL (ref 0–0.2)
BASOPHILS NFR BLD: 0.2 % (ref 0–1.9)
BLD PROD TYP BPU: NORMAL
BLD PROD TYP BPU: NORMAL
BLOOD UNIT EXPIRATION DATE: NORMAL
BLOOD UNIT EXPIRATION DATE: NORMAL
BLOOD UNIT TYPE CODE: 6200
BLOOD UNIT TYPE CODE: 6200
BLOOD UNIT TYPE: NORMAL
BLOOD UNIT TYPE: NORMAL
BUN SERPL-MCNC: 12 MG/DL (ref 8–23)
CALCIUM SERPL-MCNC: 8.2 MG/DL (ref 8.7–10.5)
CHLORIDE SERPL-SCNC: 103 MMOL/L (ref 95–110)
CO2 SERPL-SCNC: 24 MMOL/L (ref 23–29)
CODING SYSTEM: NORMAL
CODING SYSTEM: NORMAL
CREAT SERPL-MCNC: 0.6 MG/DL (ref 0.5–1.4)
CROSSMATCH INTERPRETATION: NORMAL
CROSSMATCH INTERPRETATION: NORMAL
DIFFERENTIAL METHOD BLD: ABNORMAL
DISPENSE STATUS: NORMAL
DISPENSE STATUS: NORMAL
EOSINOPHIL # BLD AUTO: 0 K/UL (ref 0–0.5)
EOSINOPHIL NFR BLD: 0.2 % (ref 0–8)
ERYTHROCYTE [DISTWIDTH] IN BLOOD BY AUTOMATED COUNT: 12.5 % (ref 11.5–14.5)
EST. GFR  (NO RACE VARIABLE): >60 ML/MIN/1.73 M^2
GLUCOSE SERPL-MCNC: 118 MG/DL (ref 70–110)
HCT VFR BLD AUTO: 32.6 % (ref 40–54)
HGB BLD-MCNC: 10.7 G/DL (ref 14–18)
IMM GRANULOCYTES # BLD AUTO: 0.07 K/UL (ref 0–0.04)
IMM GRANULOCYTES NFR BLD AUTO: 1.3 % (ref 0–0.5)
LYMPHOCYTES # BLD AUTO: 1.2 K/UL (ref 1–4.8)
LYMPHOCYTES NFR BLD: 22.1 % (ref 18–48)
MAGNESIUM SERPL-MCNC: 2.1 MG/DL (ref 1.6–2.6)
MCH RBC QN AUTO: 28.8 PG (ref 27–31)
MCHC RBC AUTO-ENTMCNC: 32.8 G/DL (ref 32–36)
MCV RBC AUTO: 88 FL (ref 82–98)
MONOCYTES # BLD AUTO: 0.6 K/UL (ref 0.3–1)
MONOCYTES NFR BLD: 11.6 % (ref 4–15)
NEUTROPHILS # BLD AUTO: 3.6 K/UL (ref 1.8–7.7)
NEUTROPHILS NFR BLD: 64.6 % (ref 38–73)
NRBC BLD-RTO: 0 /100 WBC
PHOSPHATE SERPL-MCNC: 2.8 MG/DL (ref 2.7–4.5)
PLATELET # BLD AUTO: 192 K/UL (ref 150–450)
PMV BLD AUTO: 10.7 FL (ref 9.2–12.9)
POCT GLUCOSE: 125 MG/DL (ref 70–110)
POCT GLUCOSE: 153 MG/DL (ref 70–110)
POTASSIUM SERPL-SCNC: 3.9 MMOL/L (ref 3.5–5.1)
RBC # BLD AUTO: 3.72 M/UL (ref 4.6–6.2)
SODIUM SERPL-SCNC: 137 MMOL/L (ref 136–145)
TRANS ERYTHROCYTES VOL PATIENT: NORMAL ML
TRANS ERYTHROCYTES VOL PATIENT: NORMAL ML
WBC # BLD AUTO: 5.51 K/UL (ref 3.9–12.7)

## 2024-04-14 PROCEDURE — 97535 SELF CARE MNGMENT TRAINING: CPT | Mod: CO

## 2024-04-14 PROCEDURE — 36415 COLL VENOUS BLD VENIPUNCTURE: CPT | Performed by: STUDENT IN AN ORGANIZED HEALTH CARE EDUCATION/TRAINING PROGRAM

## 2024-04-14 PROCEDURE — 80048 BASIC METABOLIC PNL TOTAL CA: CPT | Performed by: STUDENT IN AN ORGANIZED HEALTH CARE EDUCATION/TRAINING PROGRAM

## 2024-04-14 PROCEDURE — 25000003 PHARM REV CODE 250

## 2024-04-14 PROCEDURE — 97116 GAIT TRAINING THERAPY: CPT | Mod: CQ

## 2024-04-14 PROCEDURE — 85025 COMPLETE CBC W/AUTO DIFF WBC: CPT | Performed by: STUDENT IN AN ORGANIZED HEALTH CARE EDUCATION/TRAINING PROGRAM

## 2024-04-14 PROCEDURE — 25000003 PHARM REV CODE 250: Performed by: INTERNAL MEDICINE

## 2024-04-14 PROCEDURE — 97110 THERAPEUTIC EXERCISES: CPT | Mod: CQ

## 2024-04-14 PROCEDURE — 63600175 PHARM REV CODE 636 W HCPCS: Performed by: STUDENT IN AN ORGANIZED HEALTH CARE EDUCATION/TRAINING PROGRAM

## 2024-04-14 PROCEDURE — 83735 ASSAY OF MAGNESIUM: CPT | Performed by: STUDENT IN AN ORGANIZED HEALTH CARE EDUCATION/TRAINING PROGRAM

## 2024-04-14 PROCEDURE — 97530 THERAPEUTIC ACTIVITIES: CPT | Mod: CO

## 2024-04-14 PROCEDURE — 84100 ASSAY OF PHOSPHORUS: CPT | Performed by: STUDENT IN AN ORGANIZED HEALTH CARE EDUCATION/TRAINING PROGRAM

## 2024-04-14 PROCEDURE — 25000003 PHARM REV CODE 250: Performed by: STUDENT IN AN ORGANIZED HEALTH CARE EDUCATION/TRAINING PROGRAM

## 2024-04-14 PROCEDURE — 99231 SBSQ HOSP IP/OBS SF/LOW 25: CPT | Mod: GC,,, | Performed by: ANESTHESIOLOGY

## 2024-04-14 RX ORDER — PREGABALIN 75 MG/1
75 CAPSULE ORAL NIGHTLY
Qty: 30 CAPSULE | Refills: 0 | Status: SHIPPED | OUTPATIENT
Start: 2024-04-14 | End: 2024-10-13

## 2024-04-14 RX ORDER — ACETAMINOPHEN 500 MG
1000 TABLET ORAL EVERY 8 HOURS
Start: 2024-04-14 | End: 2024-04-28

## 2024-04-14 RX ORDER — METHOCARBAMOL 750 MG/1
750 TABLET, FILM COATED ORAL EVERY 8 HOURS
Start: 2024-04-14 | End: 2024-04-24

## 2024-04-14 RX ORDER — OXYCODONE HYDROCHLORIDE 5 MG/1
5 TABLET ORAL EVERY 4 HOURS PRN
Qty: 21 TABLET | Refills: 0 | Status: SHIPPED | OUTPATIENT
Start: 2024-04-14

## 2024-04-14 RX ORDER — TRAZODONE HYDROCHLORIDE 50 MG/1
25 TABLET ORAL NIGHTLY PRN
Start: 2024-04-14 | End: 2025-04-14

## 2024-04-14 RX ORDER — POLYETHYLENE GLYCOL 3350 17 G/17G
17 POWDER, FOR SOLUTION ORAL 2 TIMES DAILY PRN
Start: 2024-04-14

## 2024-04-14 RX ADMIN — TAMSULOSIN HYDROCHLORIDE 0.4 MG: 0.4 CAPSULE ORAL at 08:04

## 2024-04-14 RX ADMIN — APIXABAN 2.5 MG: 2.5 TABLET, FILM COATED ORAL at 08:04

## 2024-04-14 RX ADMIN — ATORVASTATIN CALCIUM 40 MG: 40 TABLET, FILM COATED ORAL at 08:04

## 2024-04-14 RX ADMIN — DOCUSATE SODIUM AND SENNOSIDES 1 TABLET: 8.6; 5 TABLET, FILM COATED ORAL at 08:04

## 2024-04-14 RX ADMIN — METHOCARBAMOL 750 MG: 750 TABLET ORAL at 06:04

## 2024-04-14 RX ADMIN — POLYETHYLENE GLYCOL 3350 17 G: 17 POWDER, FOR SOLUTION ORAL at 08:04

## 2024-04-14 RX ADMIN — AMLODIPINE BESYLATE 10 MG: 10 TABLET ORAL at 08:04

## 2024-04-14 RX ADMIN — METHOCARBAMOL 750 MG: 750 TABLET ORAL at 01:04

## 2024-04-14 RX ADMIN — ROPIVACAINE HYDROCHLORIDE 0.1 ML/HR: 2 INJECTION, SOLUTION EPIDURAL; INFILTRATION at 02:04

## 2024-04-14 RX ADMIN — MUPIROCIN: 20 OINTMENT TOPICAL at 08:04

## 2024-04-14 RX ADMIN — CELECOXIB 200 MG: 200 CAPSULE ORAL at 08:04

## 2024-04-14 NOTE — DISCHARGE SUMMARY
[facility] - [facility]  [facility] Medicine  Discharge Summary      Patient Name: Blayne Chavis  MRN: [id number]  ESVIN: [id number]  Patient Class: IP- Inpatient  Admission Date: 4/9/2024  Hospital Length of Stay: 5 days  Discharge Date and Time: 4/14/2024  3:13 PM  Attending Physician: No att. providers found   Discharging Provider: Jimbo Dixon MD  Primary Care Provider: Jon Baca MD  [facility] Medicine Team: [facility] MED  Jimbo Dixon MD  Primary Care Team: [facility] MED     HPI:   Blayne Chavis is a 73 y.o. male with history of CVA with residual mild right-sided weakness, history of R hip arthroplasty, HTN, HLD [facility].     He reports that when walking today, he stubbed his toe, causing him to fall to the ground.  He immediately noted right hip pain, however he thought this was due to just a strain, and continue to walk.  However, the pain did not resolve.  [facility]  He denies any presyncopal symptoms or loss of consciousness.  At baseline, he has mild residual right-sided weakness from his prior CVA but is able to ambulate without any assistive devices.    Reportedly XR showed R proximal trochanteric fracture with displacement and comminuted component. For this reason, he was transferred here due to need for Orthopedics.     Procedure(s) (LRB):  ORIF, FRACTURE, FEMUR, INTERTROCHANTERIC - PP around EVERETT (Right)      Hospital Course:   [facility] with periprosthetic comminuted fracture of the intertrochanteric and greater trochanteric region of the right femur in patient with previous total hip arthroplasty in 2014. Orthopedics evauated and plan ORIF of right proximal femur to repair fracture. Patient started on multimodal pain medciations with scheduled Tylenol, Robaxin and Lyrica with Oxycodone IR po prn for breakthrough pain. Patient went to OR on 4/11 for operative repair so started  on Heparin 5000 units subcutaneous TID for DVT prophylaxis.  Apixaban DVT prophylaxis post-op. Anesthesia management of pain post op with PNC and multi modals. Discharged to Rehab with ortho follow up.    Patient deemed appropriate for discharge. I personally saw, examined, and evaluated patient prior to departure. Plan discussed with patient, who was agreeable and amenable; medications were discussed and reviewed, outpatient follow-up scheduled, ER precautions were given, all questions were answered to the patient's satisfaction, and Blayne Chavis was subsequently discharged.         Goals of Care Treatment Preferences:  Code Status: DNR      Consults:   Consults (From admission, onward)          Status Ordering Provider     Inpatient consult to Physical Medicine Rehab  Once        Provider:  (Not yet assigned)    Completed VINNIE MERCADO     Inpatient consult to Social Work/Case Management  Once        Provider:  (Not yet assigned)    Acknowledged PHONG GREEN     Inpatient consult to Social Work/Case Management  Once        Provider:  (Not yet assigned)    Acknowledged PHONG GREEN     Inpatient consult to Orthopedics  Once        Provider:  (Not yet assigned)    Completed PHONG GREEN            No new Assessment & Plan notes have been filed under this hospital service since the last note was generated.  Service: Hospital Medicine    Final Active Diagnoses:    Diagnosis Date Noted POA    PRINCIPAL PROBLEM:  Periprosthetic fracture around internal prosthetic right hip joint [M97.8XXA, Z96.649] 04/09/2024 Not Applicable    Slow transit constipation [K59.01] 04/10/2024 Yes    Hypophosphatemia [E83.39] 04/13/2024 No    Preoperative examination [Z01.818] 04/09/2024 Not Applicable    Controlled type 2 diabetes mellitus without complication, without long-term current use of insulin [E11.9] 04/09/2024 Yes    DNR (do not resuscitate) [Z66] 04/09/2024 Yes    History of stroke  [Z86.73] 06/13/2014 Not Applicable     Chronic    Pure hypercholesterolemia [E78.00] 06/08/2014 Yes    Primary hypertension [I10] 11/20/2012 Yes      Problems Resolved During this Admission:       Discharged Condition: good    Disposition: Home or Self Care    Follow Up:    Patient Instructions:      Ambulatory referral/consult to Orthopedics   Standing Status: Future   Referral Priority: Routine Referral Type: Consultation   Requested Specialty: Orthopedic Surgery   Number of Visits Requested: 1     Ambulatory referral/consult to Orthopedics Fracture Care   Standing Status: Future   Referral Priority: Routine Referral Type: Consultation   Requested Specialty: Orthopedic Surgery   Number of Visits Requested: 1       Significant Diagnostic Studies: Labs: All labs within the past 24 hours have been reviewed    Pending Diagnostic Studies:       None           Medications:  Reconciled Home Medications:      Medication List        START taking these medications      acetaminophen 500 MG tablet  Commonly known as: TYLENOL  Take 2 tablets (1,000 mg total) by mouth every 8 (eight) hours. for 14 days     apixaban 2.5 mg Tab  Commonly known as: ELIQUIS  Take 1 tablet (2.5 mg total) by mouth 2 (two) times daily.     methocarbamoL 750 MG Tab  Commonly known as: ROBAXIN  Take 1 tablet (750 mg total) by mouth every 8 (eight) hours. for 10 days     polyethylene glycol 17 gram Pwpk  Commonly known as: GLYCOLAX  Take 17 g by mouth 2 (two) times daily as needed for Constipation.     pregabalin 75 MG capsule  Commonly known as: LYRICA  Take 1 capsule (75 mg total) by mouth every evening.     traZODone 50 MG tablet  Commonly known as: DESYREL  Take 0.5 tablets (25 mg total) by mouth nightly as needed for Insomnia.            CHANGE how you take these medications      atorvastatin 80 MG tablet  Commonly known as: LIPITOR  Take 40 mg by mouth once daily.  What changed: Another medication with the same name was removed. Continue taking  this medication, and follow the directions you see here.     losartan 100 MG tablet  Commonly known as: COZAAR  Take 1 tablet by mouth once daily.  What changed: Another medication with the same name was removed. Continue taking this medication, and follow the directions you see here.     oxyCODONE 5 MG immediate release tablet  Commonly known as: ROXICODONE  Take 1 tablet (5 mg total) by mouth every 4 (four) hours as needed for Pain.  What changed:   how much to take  how to take this  when to take this  reasons to take this  additional instructions            CONTINUE taking these medications      amLODIPine 10 MG tablet  Commonly known as: NORVASC  10 mg.     omeprazole 20 MG capsule  Commonly known as: PRILOSEC  Take 20 mg by mouth once daily.     tamsulosin 0.4 mg Cap  Commonly known as: FLOMAX  Take 1 capsule (0.4 mg total) by mouth once daily.            STOP taking these medications      aspirin 325 MG EC tablet  Commonly known as: ECOTRIN     hydroCHLOROthiazide 12.5 mg capsule  Commonly known as: MICROZIDE     HYDROcodone-acetaminophen  mg per tablet  Commonly known as: NORCO     HYDROcodone-acetaminophen 5-325 mg per tablet  Commonly known as: NORCO     ibuprofen 400 MG tablet  Commonly known as: ADVIL,MOTRIN     naproxen 500 MG tablet  Commonly known as: NAPROSYN     ondansetron 4 MG Tbdl  Commonly known as: ZOFRAN-ODT     senna-docusate 8.6-50 mg 8.6-50 mg per tablet  Commonly known as: PERICOLACE     ZANAFLEX 4 MG tablet  Generic drug: tiZANidine              Indwelling Lines/Drains at time of discharge:   Lines/Drains/Airways       None                   Time spent on the discharge of patient: 35 minutes         Jimbo Dixon MD  Department of Hospital Medicine  [facility] - Surgery

## 2024-04-14 NOTE — ANESTHESIA POST-OP PAIN MANAGEMENT
Acute Pain Service Progress Note    Blayne Chavis is a 73 y.o., male, [id number].    Surgery:  ORIF, FRACTURE, FEMUR, INTERTROCHANTERIC - PP around EVERETT (Right: Hip)      Post Op Day #: 3     Catheter type: perineural  SIFI     Infusion type: Ropivacaine 0.2%  15mL/3hr basal    Problem List:    Active Hospital Problems    Diagnosis  POA    *Periprosthetic fracture around internal prosthetic right hip joint [M97.8XXA, Z96.649]  Not Applicable    Hypophosphatemia [E83.39]  No    Slow transit constipation [K59.01]  Yes    Preoperative examination [Z01.818]  Not Applicable    Controlled type 2 diabetes mellitus without complication, without long-term current use of insulin [E11.9]  Yes    DNR (do not resuscitate) [Z66]  Yes    History of stroke [Z86.73]  Not Applicable     Chronic    Pure hypercholesterolemia [E78.00]  Yes    Primary hypertension [I10]  Yes      Resolved Hospital Problems   No resolved problems to display.       Subjective:                 General appearance of alert, oriented, no complaints              Pain with rest: 2    Numbers              Pain with movement: 5    Numbers              Side Effects                          1. Pruritis No                          2. Nausea No                          3. Motor Blockade No, 0=Ability to raise lower extremities off bed                          4. Sedation No, 1=awake and alert     Objective:                               Catheter site clean, dry, intact                    Vitals   Vitals:    04/14/24 0505   BP: (!) 140/81   Pulse: 85   Resp: 18   Temp: 36.9 °C (98.5 °F)        Labs    No results displayed because visit has over 200 results.           Meds   Current Facility-Administered Medications   Medication Dose Route Frequency Provider Last Rate Last Admin    0.9%  NaCl infusion (for blood administration)   Intravenous Q24H PRN Mariposa Argueta MD        acetaminophen tablet 1,000 mg  1,000 mg Oral Q8H Efrain Cornejo MD        aluminum-magnesium  hydroxide-simethicone 200-200-20 mg/5 mL suspension 30 mL  30 mL Oral QID PRN Reuben Castrejon MD        amLODIPine tablet 10 mg  10 mg Oral Daily Reuben Castrejon MD   10 mg at 04/13/24 0911    apixaban tablet 2.5 mg  2.5 mg Oral BID Ford Salinas MD   2.5 mg at 04/13/24 2059    atorvastatin tablet 40 mg  40 mg Oral Daily Reuben Castrejon MD   40 mg at 04/13/24 0911    bisacodyL suppository 10 mg  10 mg Rectal Daily PRN Reuben Castrejon MD   10 mg at 04/13/24 0914    celecoxib capsule 200 mg  200 mg Oral Daily Ford Salinas MD   200 mg at 04/13/24 0911    dextrose 10% bolus 125 mL 125 mL  12.5 g Intravenous PRN Angela Tripathi MD        dextrose 10% bolus 250 mL 250 mL  25 g Intravenous PRN Angela Tripathi MD        glucagon (human recombinant) injection 1 mg  1 mg Intramuscular PRN Reuben Castrejon MD        glucose chewable tablet 16 g  16 g Oral PRN Reuben Castrejon MD        glucose chewable tablet 24 g  24 g Oral PRN Reuben Castrejon MD        glycerin adult suppository 1 suppository  1 suppository Rectal Once PRN Reuben Castrejon MD        insulin aspart U-100 pen 0-5 Units  0-5 Units Subcutaneous QID (AC + HS) PRN Angela Tripathi MD   1 Units at 04/11/24 2248    methocarbamoL tablet 750 mg  750 mg Oral Q8H Jennifer Funes MD   750 mg at 04/14/24 0610    mupirocin 2 % ointment   Nasal BID Cale Fernandez MD   Given at 04/13/24 2101    naloxone 0.4 mg/mL injection 0.02 mg  0.02 mg Intravenous PRN Reuben Castrejon MD        ondansetron injection 4 mg  4 mg Intravenous Q12H PRN Reuben Castrejon MD        polyethylene glycol packet 17 g  17 g Oral Daily Angela Tripathi MD   17 g at 04/13/24 0911    pregabalin capsule 75 mg  75 mg Oral QHS Jennifer Funes MD   75 mg at 04/13/24 2051    ROPIvacaine (PF) 2 mg/ml (0.2%) solution  0.1 mL/hr Perineural Continuous Reuben Castrejon MD 0.1 mL/hr at 04/14/24  0251 0.1 mL/hr at 04/14/24 0251    senna-docusate 8.6-50 mg per tablet 1 tablet  1 tablet Oral BID Angela Tripathi MD   1 tablet at 04/13/24 2059    simethicone chewable tablet 80 mg  1 tablet Oral QID PRN Reuben Castrejon MD        sodium chloride 0.9% flush 1-10 mL  1-10 mL Intravenous Q12H PRN Reuben Castrejon MD        sodium chloride 0.9% flush 10 mL  10 mL Intravenous PRN Reuben Castrejon MD        tamsulosin 24 hr capsule 0.4 mg  0.4 mg Oral Daily Reuben Castrejon MD   0.4 mg at 04/13/24 0911    trazodone split tablet 25 mg  25 mg Oral Nightly PRN Jimbo Dixon MD   25 mg at 04/13/24 2059           Assessment:                Pain control adequate     Plan:               Patient doing well, continue present treatment.     1.) Pause/pull PNC today  2.) Multimodal pain regimen; tylenol 1g q6h, robaxin 750mg q8h, 75mg QHS  3.) Limit narcotics as able.

## 2024-04-14 NOTE — ASSESSMENT & PLAN NOTE
Blayne Chavis is a 73 y.o. male w/PMH of R-EVERETT (2014, Dr. Sanders), T2DM, YARELI, GERD, prior CVA with residual right-sided weakness, presenting as transfer with a right [place] B1 periprosthetic proximal femur fracture. Closed, neurovascularly intact, and without any other musculoskeletal injuries on physical exam. Now s/p ORIF right proximal femur periprosthetic fracture on 4/11/24. Doing well.    Pain control: multimodal, PNC per APS. Rec oral pain meds  PT/OT: TTWB RLE  DVT PPx: Eliquis, SCDs at all times when not ambulating  Abx: postop Ancef  Diaz: none    Dispo:Recommend High intensity therapy. Stable for DC

## 2024-04-14 NOTE — PROGRESS NOTES
[facility] - [facility]  Orthopedics  Progress Note    Patient Name: Blayne Chavis  MRN: [id number]  Admission Date: 4/9/2024  Hospital Length of Stay: 5 days  Attending Provider: Jimbo Dixon MD  Primary Care Provider: Jon Baca MD  Follow-up For: Procedure(s) (LRB):  ORIF, FRACTURE, FEMUR, INTERTROCHANTERIC - PP around EVERETT (Right)    Post-Operative Day: 3 Days Post-Op  Subjective:     Principal Problem:Periprosthetic fracture around internal prosthetic hip joint    Principal Orthopedic Problem: same as above    Interval History: Patient seen and examined at bedside. NAEON. Patient doing well. Worked with PT yesterday.       Review of patient's allergies indicates:   Allergen Reactions    Sulfa (sulfonamide antibiotics) Other (See Comments)       Current Facility-Administered Medications   Medication Dose Route Frequency Provider Last Rate Last Admin    0.9%  NaCl infusion (for blood administration)   Intravenous Q24H PRN Mariposa Argueta MD        acetaminophen tablet 1,000 mg  1,000 mg Oral Q8H Efrain Cornejo MD        aluminum-magnesium hydroxide-simethicone 200-200-20 mg/5 mL suspension 30 mL  30 mL Oral QID PRN Reuben Castrejon MD        amLODIPine tablet 10 mg  10 mg Oral Daily Reuben Castrejon MD   10 mg at 04/13/24 0911    apixaban tablet 2.5 mg  2.5 mg Oral BID Ford Salinas MD   2.5 mg at 04/13/24 2059    atorvastatin tablet 40 mg  40 mg Oral Daily Reuben Castrejon MD   40 mg at 04/13/24 0911    bisacodyL suppository 10 mg  10 mg Rectal Daily PRN Reuben Castrejon MD   10 mg at 04/13/24 0914    celecoxib capsule 200 mg  200 mg Oral Daily Ford Salinas MD   200 mg at 04/13/24 0911    dextrose 10% bolus 125 mL 125 mL  12.5 g Intravenous PRN Angela Tripathi MD        dextrose 10% bolus 250 mL 250 mL  25 g Intravenous PRN Angela Tripathi MD        glucagon (human recombinant) injection 1 mg  1 mg Intramuscular PRN Reuben Castrejon MD         glucose chewable tablet 16 g  16 g Oral PRN Reuben Castrejon MD        glucose chewable tablet 24 g  24 g Oral PRN Reuben Castrejon MD        glycerin adult suppository 1 suppository  1 suppository Rectal Once PRN Reuben Castrejon MD        insulin aspart U-100 pen 0-5 Units  0-5 Units Subcutaneous QID (AC + HS) PRN Angela Tripathi MD   1 Units at 04/11/24 2248    methocarbamoL tablet 750 mg  750 mg Oral Q8H Jennifer Funes MD   750 mg at 04/14/24 0610    mupirocin 2 % ointment   Nasal BID Cale Fernandez MD   Given at 04/13/24 2101    naloxone 0.4 mg/mL injection 0.02 mg  0.02 mg Intravenous PRN Reuben Castrejon MD        ondansetron injection 4 mg  4 mg Intravenous Q12H PRN Reuben Castrejon MD        polyethylene glycol packet 17 g  17 g Oral Daily Angela Tripathi MD   17 g at 04/13/24 0911    pregabalin capsule 75 mg  75 mg Oral QHS Jennifer Funes MD   75 mg at 04/13/24 2059    ROPIvacaine (PF) 2 mg/ml (0.2%) solution  0.1 mL/hr Perineural Continuous Reuben Castrejon MD 0.1 mL/hr at 04/14/24 0251 0.1 mL/hr at 04/14/24 0251    senna-docusate 8.6-50 mg per tablet 1 tablet  1 tablet Oral BID Angela Tripathi MD   1 tablet at 04/13/24 2059    simethicone chewable tablet 80 mg  1 tablet Oral QID PRN Reuben Castrejon MD        sodium chloride 0.9% flush 1-10 mL  1-10 mL Intravenous Q12H PRN Reuben Castrejon MD        sodium chloride 0.9% flush 10 mL  10 mL Intravenous PRN Reuben Castrejon MD        tamsulosin 24 hr capsule 0.4 mg  0.4 mg Oral Daily Reuben Castrejon MD   0.4 mg at 04/13/24 0911    trazodone split tablet 25 mg  25 mg Oral Nightly PRN Jimbo Dixon MD   25 mg at 04/13/24 2059     Objective:     Vital Signs (Most Recent):  Temp: 98.5 °F (36.9 °C) (04/14/24 0505)  Pulse: 85 (04/14/24 0505)  Resp: 18 (04/14/24 0505)  BP: (!) 140/81 (04/14/24 0505)  SpO2: (!) 93 % (04/14/24 0505) Vital Signs (24h  "Range):  Temp:  [97.6 °F (36.4 °C)-98.5 °F (36.9 °C)] 98.5 °F (36.9 °C)  Pulse:  [74-94] 85  Resp:  [18] 18  SpO2:  [93 %-96 %] 93 %  BP: (130-152)/(71-83) 140/81     Weight: 97.5 kg (215 lb)  Height: 5' 7" (170.2 cm)  Body mass index is 33.67 kg/m².      Intake/Output Summary (Last 24 hours) at 4/14/2024 0720  Last data filed at 4/14/2024 0525  Gross per 24 hour   Intake 660 ml   Output 1950 ml   Net -1290 ml        Ortho/SPM Exam  AAOx4  NAD  Reg rate  No increased WOB    RLE:  Dressing c/d/i  SILT T/SP/DP/Block/Sa  Motor intact T/SP/DP  FCDs in place and functioning       Significant Labs: CBC:   Recent Labs   Lab 04/13/24  0542 04/14/24  0207   WBC 6.47 5.51   HGB 10.9* 10.7*   HCT 33.0* 32.6*    192     CMP:   Recent Labs   Lab 04/13/24  0542 04/14/24  0207    137   K 3.7 3.9    103   CO2 27 24   * 118*   BUN 12 12   CREATININE 0.7 0.6   CALCIUM 8.4* 8.2*   ANIONGAP 6* 10       All pertinent labs within the past 24 hours have been reviewed.    Significant Imaging: I have reviewed all pertinent imaging results/findings.  Assessment/Plan:     * Periprosthetic fracture around internal prosthetic right hip joint  Blayne Chavis is a 73 y.o. male w/PMH of R-EVERETT (2014, Dr. Sanders), T2DM, YARELI, GERD, prior CVA with residual right-sided weakness, presenting as transfer with a right [place] B1 periprosthetic proximal femur fracture. Closed, neurovascularly intact, and without any other musculoskeletal injuries on physical exam. Now s/p ORIF right proximal femur periprosthetic fracture on 4/11/24. Doing well.    Pain control: multimodal, PNC per APS. Rec oral pain meds  PT/OT: TTWB RLE  DVT PPx: Eliquis, SCDs at all times when not ambulating  Abx: postop Ancef  Diaz: none    Dispo:Recommend High intensity therapy. Stable for DC          Kar Altamirano MD  Orthopedics  [facility] - Surgery    "

## 2024-04-14 NOTE — PT/OT/SLP PROGRESS
Physical Therapy Treatment    Patient Name:  Blayne Chavis   MRN:  [id number]    Recommendations:     Discharge Recommendations: High Intensity Therapy (pt has a history of CVA with R sided weakness, his wife works outside the home, his weight bearing status is TTWB R LE, and he has 7 steps to enter his home)  Discharge Equipment Recommendations: bedside commode  Barriers to discharge: None    Assessment:     Blayne Chavis is a 73 y.o. male admitted with a medical diagnosis of Periprosthetic fracture around internal prosthetic hip joint.  He presents with the following impairments/functional limitations: weakness, impaired endurance, impaired self care skills, impaired functional mobility, gait instability, decreased lower extremity function, decreased upper extremity function, decreased safety awareness, decreased ROM, orthopedic precautions, impaired cardiopulmonary response to activity Pt tolerated treatment session well today. Patient remains appropriate for continued skilled services within the acute environment and goals remain appropriate.   .    Rehab Prognosis: Good; patient would benefit from acute skilled PT services to address these deficits and reach maximum level of function.    Recent Surgery: Procedure(s) (LRB):  ORIF, FRACTURE, FEMUR, INTERTROCHANTERIC - PP around EVERETT (Right) 3 Days Post-Op    Plan:     During this hospitalization, patient to be seen daily to address the identified rehab impairments via gait training, therapeutic activities, therapeutic exercises, neuromuscular re-education and progress toward the following goals:    Plan of Care Expires:  05/12/24    Subjective     Chief Complaint: None stated   Patient/Family Comments/goals: Pt agreeable to PT   Pain/Comfort:  Pain Rating 1: 0/10      Objective:     Communicated with RN prior to session.  Patient found up in chair with telemetry, perineural catheter upon PT entry to room.     General Precautions: Standard, fall  Orthopedic  Precautions: RLE toe touch weight bearing  Braces: N/A  Respiratory Status: Room air when PTA first entered the room, oxygen donned after session      Functional Mobility:  Bed Mobility:     Scooting: stand by assistance  Bridging: stand by assistance  Supine to Sit: stand by assistance    Transfers:     Sit to Stand:  minimum assistance with rolling walker  Chair to mat: minimum assistance with  rolling walker  using  Step Transfer  Gait: ~ 4 ft to bedside chair CGA to Sam at times due to instability with RW   Pt had a hard time maintaining TTWB, further gait held for safety    Pt performed 10 repetitions of seated B LE exercises consisting of: QS, HS, ADD/ABD, AP         AM-PAC 6 CLICK MOBILITY  Turning over in bed (including adjusting bedclothes, sheets and blankets)?: 3  Sitting down on and standing up from a chair with arms (e.g., wheelchair, bedside commode, etc.): 3  Moving from lying on back to sitting on the side of the bed?: 3  Moving to and from a bed to a chair (including a wheelchair)?: 3  Need to walk in hospital room?: 2  Climbing 3-5 steps with a railing?: 1  Basic Mobility Total Score: 15       Treatment & Education:  Therapist provided instruction and educated for safety during transfers and gait training. As well as proper body mechanics, energy conservation, and fall prevention strategies during tasks listed above, and the effects of prolonged immobility and the importance of performing EOB/OOB activity and exercises to promote healing and reduce recovery time.       Patient left supine with all lines intact, call button in reach, and RN notified..    GOALS:   Multidisciplinary Problems       Physical Therapy Goals          Problem: Physical Therapy    Goal Priority Disciplines Outcome Goal Variances Interventions   Physical Therapy Goal     PT, PT/OT Ongoing, Progressing     Description: PT goals until 4/30/24    1. Pt supine to sit with CGA-not met  2. Pt sit to supine with CGA-not met  3. Pt  sit to stand with RW with TTWB R LE with CGA-not met  4. Pt to perform gait 40ft with TTWB R LE with minimal assist.-not met  5. Pt to transfer bed to/from bedside chair with RW with TTWB R LE with minimal assist.-not met  6. Pt to perform B LE exs in sitting or supine x 10 reps to strengthen B LE to improve functional mobility.-not met   7. PT to assess and write goals for steps when pt able to tolerate-not met                       Time Tracking:     PT Received On: 04/14/24  PT Start Time: 1033     PT Stop Time: 1057  PT Total Time (min): 24 min     Billable Minutes: Gait Training 8 and Therapeutic Exercise 16    Treatment Type: Treatment  PT/PTA: PTA     Number of PTA visits since last PT visit: 2     04/14/2024

## 2024-04-14 NOTE — PLAN OF CARE
[facility]  Discharge Final Note    Primary Care Provider: Jon Baca MD    Expected Discharge Date: 4/15/2024    Patient accepted for admit to [facility] today.    Wheelchair van requested with [facility] for 3 pm pickup time (ETA pending availability).    Final Discharge Note (most recent)       Final Note - 04/14/24 1320          Final Note    Assessment Type Final Discharge Note     Anticipated Discharge Disposition Rehab Facility        Post-Acute Status    Post-Acute Authorization Placement     Post-Acute Placement Status Set-up Complete/Auth obtained                     Important Message from Medicare

## 2024-04-14 NOTE — NURSING
Nurses Note -- 4 Eyes      4/13/2024   9:14 PM      Skin assessed during: Q Shift Change      [x] No Altered Skin Integrity Present    []Prevention Measures Documented      [] Yes- Altered Skin Integrity Present or Discovered   [] LDA Added if Not in Epic (Describe Wound)   [] New Altered Skin Integrity was Present on Admit and Documented in LDA   [] Wound Image Taken    Wound Care Consulted? No    Attending Nurse:  Jocelyn Devi RN/Staff Member:  Lamont

## 2024-04-14 NOTE — PT/OT/SLP PROGRESS
Occupational Therapy   Treatment    Name: Blayne Chavis  MRN: [id number]  Admitting Diagnosis:  Periprosthetic fracture around internal prosthetic hip joint  3 Days Post-Op    Recommendations:     Discharge Recommendations: High Intensity Therapy  Discharge Equipment Recommendations:  bedside commode  Barriers to discharge:       Assessment:     Blayne Chavis is a 73 y.o. male with a medical diagnosis of Periprosthetic fracture around internal prosthetic hip joint.  He presents with the following performance deficits affecting function: weakness, impaired endurance, impaired self care skills, impaired functional mobility, gait instability, impaired balance, decreased lower extremity function, decreased safety awareness, orthopedic precautions, decreased coordination, impaired cardiopulmonary response to activity, decreased ROM.     Rehab Prognosis:  Good; patient would benefit from acute skilled OT services to address these deficits and reach maximum level of function.       Plan:     Patient to be seen daily to address the above listed problems via self-care/home management, therapeutic activities, therapeutic exercises  Plan of Care Expires: 04/19/24  Plan of Care Reviewed with: patient    Subjective     Chief Complaint: discomfort in R hip  Patient/Family Comments/goals: to improve function  Pain/Comfort:  Pain Rating 1: 0/10  Pain Rating Post-Intervention 1: 0/10    Objective:     Communicated with: RN prior to session.  Patient found HOB elevated with telemetry, perineural catheter upon OT entry to room.    General Precautions: Standard, fall    Orthopedic Precautions:RLE toe touch weight bearing  Braces: N/A  Respiratory Status: Room air     Occupational Performance:     Bed Mobility:    Patient completed Scooting/Bridging with stand by assistance  Patient completed Supine to Sit with stand by assistance     Functional Mobility/Transfers:  Patient completed Sit <> Stand Transfer with contact guard  assistance  with  rolling walker   Patient completed Bed <> Chair Transfer using Step Transfer technique with contact guard assistance with rolling walker  Functional Mobility: pt ambulating ~6 steps with CGA using RW from bed>chair. No LOB noted, mild SOB displayed    Activities of Daily Living:  Grooming: supervision sitting in bedside chair for oral hygiene and facial care      [facility] 6 Click ADL: 16    Treatment & Education:  Pt educated on OT POC and frequency during hospital stay.   Pt educated on proper hand placement and techniques for RW mgmt to improve safety awareness.   Pt educated on TTWB precautions.   Pt educated on importance of OOB activity to improve function and activity tolerance.  Addressed all patient questions/concerns within [facility] scope of practice.     Patient left up in chair with all lines intact, call button in reach, and RN notified    GOALS:   Multidisciplinary Problems       Occupational Therapy Goals       Not on file                    Time Tracking:     OT Date of Treatment: 04/14/24  OT Start Time: 0900  OT Stop Time: 0923  OT Total Time (min): 23 min    Billable Minutes:Self Care/Home Management 13  Therapeutic Activity 10    OT/YASH: YASH     Number of YASH visits since last OT visit: 1    4/14/2024

## 2024-04-14 NOTE — PLAN OF CARE
[facility]    FACILITY TRANSFER ORDERS      Patient Name: Blayne Chavis  YOB: 1950    PCP: Jon Baca MD   PCP Address: [address] / [place] [address]  PCP Phone Number: [phone]  PCP Fax: [phone]    Encounter Date: 04/14/2024    Admit to: Rehab    Vital Signs:  Routine    Diagnoses:   Active Hospital Problems    Diagnosis  POA    *Periprosthetic fracture around internal prosthetic right hip joint [M97.8XXA, Z96.649]  Not Applicable     Priority: 1 - High    Slow transit constipation [K59.01]  Yes     Priority: 5     Hypophosphatemia [E83.39]  No    Preoperative examination [Z01.818]  Not Applicable    Controlled type 2 diabetes mellitus without complication, without long-term current use of insulin [E11.9]  Yes    DNR (do not resuscitate) [Z66]  Yes    History of stroke [Z86.73]  Not Applicable     Chronic    Pure hypercholesterolemia [E78.00]  Yes    Primary hypertension [I10]  Yes      Resolved Hospital Problems   No resolved problems to display.       Allergies:  Review of patient's allergies indicates:   Allergen Reactions    Sulfa (sulfonamide antibiotics) Other (See Comments)       Diet: regular diet    Activities: Bathroom privileges with assistance    Goals of Care Treatment Preferences:  Code Status: DNR      Nursing: Per facility     Labs:  None   needed      CONSULTS:    Physical Therapy to evaluate and treat.  and Occupational Therapy to evaluate and treat.    MISCELLANEOUS CARE:  Routine Skin for Bedridden Patients: Apply moisture barrier cream to all skin folds and wet areas in perineal area daily and after baths and all bowel movements.    WOUND CARE ORDERS  None    Medications: Review discharge medications with patient and family and provide education.      Current Discharge Medication List        START taking these medications    Details   acetaminophen (TYLENOL) 500 MG tablet Take 2 tablets (1,000 mg total) by mouth every 8 (eight) hours.  for 14 days      apixaban (ELIQUIS) 2.5 mg Tab Take 1 tablet (2.5 mg total) by mouth 2 (two) times daily.  Qty: 30 tablet, Refills: 0      methocarbamoL (ROBAXIN) 750 MG Tab Take 1 tablet (750 mg total) by mouth every 8 (eight) hours. for 10 days      polyethylene glycol (GLYCOLAX) 17 gram PwPk Take 17 g by mouth 2 (two) times daily as needed for Constipation.      pregabalin (LYRICA) 75 MG capsule Take 1 capsule (75 mg total) by mouth every evening.  Qty: 30 capsule, Refills: 0      traZODone (DESYREL) 50 MG tablet Take 0.5 tablets (25 mg total) by mouth nightly as needed for Insomnia.           CONTINUE these medications which have CHANGED    Details   oxyCODONE (ROXICODONE) 5 MG immediate release tablet Take 1 tablet (5 mg total) by mouth every 4 (four) hours as needed for Pain.  Qty: 21 tablet, Refills: 0    Comments: Quantity prescribed more than 7 day supply? No           CONTINUE these medications which have NOT CHANGED    Details   amLODIPine (NORVASC) 10 MG tablet 10 mg.      atorvastatin (LIPITOR) 80 MG tablet Take 40 mg by mouth once daily.      losartan (COZAAR) 100 MG tablet Take 1 tablet by mouth once daily.      omeprazole (PRILOSEC) 20 MG capsule Take 20 mg by mouth once daily.        tamsulosin (FLOMAX) 0.4 mg Cap Take 1 capsule (0.4 mg total) by mouth once daily.  Qty: 30 capsule, Refills: 12           STOP taking these medications       tiZANidine (ZANAFLEX) 4 MG tablet Comments:   Reason for Stopping:         aspirin (ECOTRIN) 325 MG EC tablet Comments:   Reason for Stopping:         hydrochlorothiazide (MICROZIDE) 12.5 mg capsule Comments:   Reason for Stopping:         HYDROcodone-acetaminophen (NORCO) 5-325 mg per tablet Comments:   Reason for Stopping:         HYDROcodone-acetaminophen (NORCO) 5-325 mg per tablet Comments:   Reason for Stopping:         hydrocodone-acetaminophen 10-325mg (NORCO)  mg Tab Comments:   Reason for Stopping:         ibuprofen (ADVIL,MOTRIN) 400 MG tablet  Comments:   Reason for Stopping:         naproxen (NAPROSYN) 500 MG tablet Comments:   Reason for Stopping:         ondansetron (ZOFRAN-ODT) 4 MG TbDL Comments:   Reason for Stopping:         senna-docusate 8.6-50 mg (PERICOLACE) 8.6-50 mg per tablet Comments:   Reason for Stopping:                  Immunizations Administered as of 4/14/2024       Name Date Dose VIS Date Route Exp Date    COVID-19, MRNA, LN-S, PF (Pfizer) (Purple Cap) 4/27/2022 -- -- -- --    Lot: [id number]     External: Auto Reconciled From Outside Source     Comment: Historical - Not administered in Epic     COVID-19, MRNA, LN-S, PF (Pfizer) (Purple Cap) 9/7/2021 -- -- -- --    Lot: [id number]     External: Auto Reconciled From Outside Source     Comment: Historical - Not administered in Epic     COVID-19, MRNA, LN-S, PF (Pfizer) (Purple Cap) 2/3/2021 -- -- -- --    : [employer] Inc     Lot: [id number]     External: Auto Reconciled From Outside Source     Comment: Adminis     COVID-19, MRNA, LN-S, PF (Pfizer) (Purple Cap) 1/13/2021 -- -- -- --    : [employer]     Lot: [id number]     External: Auto Reconciled From Outside Source     Comment: Adminis             This patient has had both covid vaccinations    Some patients may experience side effects after vaccination.  These may include fever, headache, muscle or joint aches.  Most symptoms resolve with 24-48 hours and do not require urgent medical evaluation unless they persist for more than 72 hours or symptoms are concerning for an unrelated medical condition.          _________________________________  Jimbo Dixon MD  04/14/2024

## 2024-04-14 NOTE — SUBJECTIVE & OBJECTIVE
Principal Problem:Periprosthetic fracture around internal prosthetic hip joint    Principal Orthopedic Problem: same as above    Interval History: Patient seen and examined at bedside. TAM. Patient doing well. Worked with PT yesterday.       Review of patient's allergies indicates:   Allergen Reactions    Sulfa (sulfonamide antibiotics) Other (See Comments)       Current Facility-Administered Medications   Medication Dose Route Frequency Provider Last Rate Last Admin    0.9%  NaCl infusion (for blood administration)   Intravenous Q24H PRN Mariposa Argueta MD        acetaminophen tablet 1,000 mg  1,000 mg Oral Q8H Efrain Cornejo MD        aluminum-magnesium hydroxide-simethicone 200-200-20 mg/5 mL suspension 30 mL  30 mL Oral QID PRN Reuben Castrejon MD        amLODIPine tablet 10 mg  10 mg Oral Daily Reuben Castrejon MD   10 mg at 04/13/24 0911    apixaban tablet 2.5 mg  2.5 mg Oral BID Ford Salinas MD   2.5 mg at 04/13/24 2059    atorvastatin tablet 40 mg  40 mg Oral Daily Reuben Castrejon MD   40 mg at 04/13/24 0911    bisacodyL suppository 10 mg  10 mg Rectal Daily PRN Reuben Castrejon MD   10 mg at 04/13/24 0914    celecoxib capsule 200 mg  200 mg Oral Daily Ford Salinas MD   200 mg at 04/13/24 0911    dextrose 10% bolus 125 mL 125 mL  12.5 g Intravenous PRN Angela Tripathi MD        dextrose 10% bolus 250 mL 250 mL  25 g Intravenous PRN Angela Tripathi MD        glucagon (human recombinant) injection 1 mg  1 mg Intramuscular PRN Reuben Castrejon MD        glucose chewable tablet 16 g  16 g Oral PRN Reuben Castrejon MD        glucose chewable tablet 24 g  24 g Oral PRN Reuben Castrejon MD        glycerin adult suppository 1 suppository  1 suppository Rectal Once PRN Reuben Castrejon MD        insulin aspart U-100 pen 0-5 Units  0-5 Units Subcutaneous QID (AC + HS) PRN Angela Tripathi MD   1 Units at 04/11/24 8195     "methocarbamoL tablet 750 mg  750 mg Oral Q8H Jennifer Funes MD   750 mg at 04/14/24 0610    mupirocin 2 % ointment   Nasal BID Cale Fernandez MD   Given at 04/13/24 2101    naloxone 0.4 mg/mL injection 0.02 mg  0.02 mg Intravenous PRN Reuben Castrejon MD        ondansetron injection 4 mg  4 mg Intravenous Q12H PRN Reuben Castrejon MD        polyethylene glycol packet 17 g  17 g Oral Daily Angela Tripathi MD   17 g at 04/13/24 0911    pregabalin capsule 75 mg  75 mg Oral QHS Jennifer Funes MD   75 mg at 04/13/24 2059    ROPIvacaine (PF) 2 mg/ml (0.2%) solution  0.1 mL/hr Perineural Continuous Reuben Castrejon MD 0.1 mL/hr at 04/14/24 0251 0.1 mL/hr at 04/14/24 0251    senna-docusate 8.6-50 mg per tablet 1 tablet  1 tablet Oral BID Angela Tripathi MD   1 tablet at 04/13/24 2059    simethicone chewable tablet 80 mg  1 tablet Oral QID PRN Reuben Castrejon MD        sodium chloride 0.9% flush 1-10 mL  1-10 mL Intravenous Q12H PRN Reuben Castrejon MD        sodium chloride 0.9% flush 10 mL  10 mL Intravenous PRN Reuben Castrejon MD        tamsulosin 24 hr capsule 0.4 mg  0.4 mg Oral Daily Reuben Castrejon MD   0.4 mg at 04/13/24 0911    trazodone split tablet 25 mg  25 mg Oral Nightly PRN Jimbo Dixon MD   25 mg at 04/13/24 2059     Objective:     Vital Signs (Most Recent):  Temp: 98.5 °F (36.9 °C) (04/14/24 0505)  Pulse: 85 (04/14/24 0505)  Resp: 18 (04/14/24 0505)  BP: (!) 140/81 (04/14/24 0505)  SpO2: (!) 93 % (04/14/24 0505) Vital Signs (24h Range):  Temp:  [97.6 °F (36.4 °C)-98.5 °F (36.9 °C)] 98.5 °F (36.9 °C)  Pulse:  [74-94] 85  Resp:  [18] 18  SpO2:  [93 %-96 %] 93 %  BP: (130-152)/(71-83) 140/81     Weight: 97.5 kg (215 lb)  Height: 5' 7" (170.2 cm)  Body mass index is 33.67 kg/m².      Intake/Output Summary (Last 24 hours) at 4/14/2024 0720  Last data filed at 4/14/2024 0525  Gross per 24 hour   Intake 660 ml   Output 1950 ml   Net " -1290 ml        Ortho/SPM Exam  AAOx4  NAD  Reg rate  No increased WOB    RLE:  Dressing c/d/i  SILT T/SP/DP/Block/Sa  Motor intact T/SP/DP  FCDs in place and functioning       Significant Labs: CBC:   Recent Labs   Lab 04/13/24  0542 04/14/24  0207   WBC 6.47 5.51   HGB 10.9* 10.7*   HCT 33.0* 32.6*    192     CMP:   Recent Labs   Lab 04/13/24  0542 04/14/24  0207    137   K 3.7 3.9    103   CO2 27 24   * 118*   BUN 12 12   CREATININE 0.7 0.6   CALCIUM 8.4* 8.2*   ANIONGAP 6* 10       All pertinent labs within the past 24 hours have been reviewed.    Significant Imaging: I have reviewed all pertinent imaging results/findings.

## 2024-04-14 NOTE — PLAN OF CARE
Problem: Adult Inpatient Plan of Care  Goal: Plan of Care Review  Outcome: Ongoing, Progressing  Goal: Patient-Specific Goal (Individualized)  Outcome: Ongoing, Progressing  Goal: Absence of Hospital-Acquired Illness or Injury  Outcome: Ongoing, Progressing  Goal: Optimal Comfort and Wellbeing  Outcome: Ongoing, Progressing  Goal: Readiness for Transition of Care  Outcome: Ongoing, Progressing     Problem: Adjustment to Injury (Hip Fracture Medical Management)  Goal: Optimal Coping with Change in Health Status  Outcome: Ongoing, Progressing    Patient AAOX4, VSS, NADN at this time Bed in lowest position, call light in reach, along with personal items. Plan of care ongoing.

## 2024-04-14 NOTE — PLAN OF CARE
Pt discharged to [facility] via wheelchair. Assisted by transport.  Patient V-A papers sent with patient. Pt denies pain at this time. Pt educated on signs and symptoms of when to call the doctor. All belongings with the patient . Pt verbalized understanding.

## 2024-04-29 ENCOUNTER — TELEPHONE (OUTPATIENT)
Dept: ORTHOPEDICS | Facility: CLINIC | Age: 74
End: 2024-04-29
Payer: MEDICARE

## 2024-04-29 NOTE — TELEPHONE ENCOUNTER
SPOKE WITH PT WIFE . PT MISSED HIS 2 WEEK PO APPT 4/25/24. PT WAS IN REHAB.   THE SOONEST WIFE STATED SHE CAN BRING IN IN IS 5/3/24. I OFFERED AN APPT FOR TOMORROW . PT STATED THAT SHE JUST GOT HOME NOT SETTLED IN YET SO TOMORROW WOULDN'T WORK FOR THEM.

## 2024-04-29 NOTE — TELEPHONE ENCOUNTER
----- Message from Dariana Ruiz MA sent at 4/29/2024  1:35 PM CDT -----  Regarding: pt advice  Contact: Nathaniel at [phone]  Pt  wife  nathaniel is calling to speak with someone in provider office is asking for a return call regarding patient  care stated that he had surgery on the 04/18/2024 please call pt wife Nathaniel at [phone]

## 2024-05-03 ENCOUNTER — OFFICE VISIT (OUTPATIENT)
Dept: ORTHOPEDICS | Facility: CLINIC | Age: 74
End: 2024-05-03
Payer: MEDICARE

## 2024-05-03 ENCOUNTER — HOSPITAL ENCOUNTER (OUTPATIENT)
Dept: RADIOLOGY | Facility: HOSPITAL | Age: 74
Discharge: HOME | End: 2024-05-03
Attending: PHYSICIAN ASSISTANT
Payer: MEDICARE

## 2024-05-03 VITALS — BODY MASS INDEX: 33.74 KG/M2 | WEIGHT: 214.94 LBS | HEIGHT: 67 IN

## 2024-05-03 DIAGNOSIS — M97.01XD PERIPROSTHETIC FRACTURE AROUND INTERNAL PROSTHETIC RIGHT HIP JOINT, SUBSEQUENT ENCOUNTER: ICD-10-CM

## 2024-05-03 DIAGNOSIS — M25.551 RIGHT HIP PAIN: Primary | ICD-10-CM

## 2024-05-03 DIAGNOSIS — M25.551 RIGHT HIP PAIN: ICD-10-CM

## 2024-05-03 PROCEDURE — 99213 OFFICE O/P EST LOW 20 MIN: CPT | Mod: PBBFAC,25 | Performed by: PHYSICIAN ASSISTANT

## 2024-05-03 PROCEDURE — 73502 X-RAY EXAM HIP UNI 2-3 VIEWS: CPT | Mod: 26,RT,, | Performed by: RADIOLOGY

## 2024-05-03 PROCEDURE — 99999 PR PBB SHADOW E&M-EST. PATIENT-LVL III: CPT | Mod: PBBFAC,,, | Performed by: PHYSICIAN ASSISTANT

## 2024-05-03 PROCEDURE — 99024 POSTOP FOLLOW-UP VISIT: CPT | Mod: POP,,, | Performed by: PHYSICIAN ASSISTANT

## 2024-05-03 PROCEDURE — 73502 X-RAY EXAM HIP UNI 2-3 VIEWS: CPT | Mod: TC,RT

## 2024-05-03 NOTE — PROGRESS NOTES
Principal Orthopedic Problem: Right [place] B1 periprosthetic intertrochanteric femur fracture around total hip arthroplasty     04/11/24: : Open reduction internal fixation right periprosthetic intertrochanteric femur fracture     Mr. Chavis is here today for a post-operative visit    Interval History:  he reports that he is doing well.   he is at home. Just d/c from rehab.  he is  participating in PT/OT. Home health   Pain is controlled.  he is  taking pain medication.    he denies fever, chills, and sweats .     Physical exam:    Patient arrives to exam room: [facility].  Patient is  accompanied    Dressing taken down.  Incision is clean, dry and intact.     Healing well no signs of breakdown or infection.    RADS: All pertinent images were reviewed by myself:   No new fractures/dislocations.  Hardware in place with callous formation.  No signs of hardware loosening/failure      Assessment:  Post-op visit ( 3 weeks)    Plan:  Current care, treatment plan, precautions, activity level/ modifications, limitations, rehabilitation exercises and proposed future treatment were discussed with the patient. We discussed the need to monitor for changes in symptoms and condition and report them to the physician.  Discussed importance of compliance with all appointments and follow up examinations.     WOUND CARE :  - The patient was advised to keep the incision clean and dry for the next 24 hours after which he may wash the area with antibacterial soap in the shower. Will not submerge until the incision is completely healed  -Patient was advised to monitor wound closely and multiple times daily for any problems. Call clinic immediately or report to ED for immediate medical attention for any complications including reopening of wound, drainage, purulence, redness, streaking, odor, pain out of proportion, fever, chills, etc.       ACTIVITY:   - light  -range of motion as tolerated posterior hip precautions   -  toe touch weight bearing 4 weeks then advance  starting 05/10/24     -PT/OT, home health , Patient is responsible to establish and continue care      PAIN MEDICATION:   - Multimodal pain control  - Pain medication: refill was not needed  - Pain medication refill policy provided to patient for review, yes.    - Patient was informed a multi-modal approach is used to treat their pain. With the goal to get off of narcotic pain medication and discontinue as soon as possible.   - ice and elevation to reduce pain and swelling     DVT PROPHYLAXIS:   - Eliquis    FOLLOW UP:   - Patient will follow up in the clinic in 4 weeks, sooner if any concerns.  - X-ray of his hip is needed.        If there are any questions prior to scheduled follow up, the patient was instructed to contact the office

## 2024-05-16 ENCOUNTER — TELEPHONE (OUTPATIENT)
Dept: ORTHOPEDICS | Facility: CLINIC | Age: 74
End: 2024-05-16
Payer: MEDICARE

## 2024-05-16 NOTE — TELEPHONE ENCOUNTER
Spoke with pt that NIKKI Montgomery PA-C will not be in the office on 5/22/24, appointment is cancel and reschedule to 5/27/24 @ 12:15 pm. Pt will arrive at 10:00 am. Patient states verbal understanding and has no further questions.

## 2024-05-27 ENCOUNTER — OFFICE VISIT (OUTPATIENT)
Dept: ORTHOPEDICS | Facility: CLINIC | Age: 74
End: 2024-05-27
Payer: MEDICARE

## 2024-05-27 ENCOUNTER — HOSPITAL ENCOUNTER (OUTPATIENT)
Dept: RADIOLOGY | Facility: HOSPITAL | Age: 74
Discharge: HOME | End: 2024-05-27
Attending: PHYSICIAN ASSISTANT
Payer: MEDICARE

## 2024-05-27 ENCOUNTER — TELEPHONE (OUTPATIENT)
Dept: ORTHOPEDICS | Facility: CLINIC | Age: 74
End: 2024-05-27

## 2024-05-27 VITALS — HEIGHT: 67 IN | WEIGHT: 214.94 LBS | BODY MASS INDEX: 33.74 KG/M2

## 2024-05-27 DIAGNOSIS — S72.009A HIP FRACTURE: ICD-10-CM

## 2024-05-27 DIAGNOSIS — M97.01XD PERIPROSTHETIC FRACTURE AROUND INTERNAL PROSTHETIC RIGHT HIP JOINT, SUBSEQUENT ENCOUNTER: Primary | ICD-10-CM

## 2024-05-27 DIAGNOSIS — R60.9 SWELLING: ICD-10-CM

## 2024-05-27 DIAGNOSIS — M97.01XD PERIPROSTHETIC FRACTURE AROUND INTERNAL PROSTHETIC RIGHT HIP JOINT, SUBSEQUENT ENCOUNTER: ICD-10-CM

## 2024-05-27 PROCEDURE — 73502 X-RAY EXAM HIP UNI 2-3 VIEWS: CPT | Mod: 26,RT,, | Performed by: RADIOLOGY

## 2024-05-27 PROCEDURE — 93971 EXTREMITY STUDY: CPT | Mod: TC,RT

## 2024-05-27 PROCEDURE — 99024 POSTOP FOLLOW-UP VISIT: CPT | Mod: POP,,, | Performed by: PHYSICIAN ASSISTANT

## 2024-05-27 PROCEDURE — 93971 EXTREMITY STUDY: CPT | Mod: 26,RT,, | Performed by: RADIOLOGY

## 2024-05-27 PROCEDURE — 99999 PR PBB SHADOW E&M-EST. PATIENT-LVL IV: CPT | Mod: PBBFAC,,, | Performed by: PHYSICIAN ASSISTANT

## 2024-05-27 PROCEDURE — 99214 OFFICE O/P EST MOD 30 MIN: CPT | Mod: PBBFAC,25 | Performed by: PHYSICIAN ASSISTANT

## 2024-05-27 PROCEDURE — 73502 X-RAY EXAM HIP UNI 2-3 VIEWS: CPT | Mod: TC,RT

## 2024-05-27 NOTE — TELEPHONE ENCOUNTER
ANKLE FOOT ORTHOSIS FAX TO:  [facility]  Phone: Toll-Free [phone] or Local [phone]  Fax: [phone]  DONE

## 2024-05-27 NOTE — PROGRESS NOTES
Principal Orthopedic Problem: Right [place] B1 periprosthetic intertrochanteric femur fracture around total hip arthroplasty     04/11/24: : Open reduction internal fixation right periprosthetic intertrochanteric femur fracture     Mr. Chavis is here today for a post-operative visit    Interval History:  he reports that he is doing well.   he is at home.   he is  participating in PT/OT. Home health d/c ready for put patient.   Pain is controlled.  he is  taking pain medication.    he denies fever, chills, and sweats .     Physical exam:    Patient arrives to exam room: [facility].  Patient is  accompanied    Full range of motion knee, drop foot on right, moderate swelling no paint o calf.     RADS: All pertinent images were reviewed by myself:   Status post total right hip replacement, status post right periprosthetic intertrochanteric femur fracture reduction with a lateral cable plate and cerclage wires. The alignment is normal. The hardware is intact. The remainder of the visualized osseous structures and soft tissues appear within normal limits. There is advanced degenerative disc disease at L4-5.       Assessment:  Post-op visit ( 7 weeks)    Plan:  Current care, treatment plan, precautions, activity level/ modifications, limitations, rehabilitation exercises and proposed future treatment were discussed with the patient. We discussed the need to monitor for changes in symptoms and condition and report them to the physician.  Discussed importance of compliance with all appointments and follow up examinations.     WOUND CARE :  -  he may wash the area with antibacterial soap in the shower. Will not submerge until the incision is completely healed  -Patient was advised to monitor wound closely and multiple times daily for any problems. Call clinic immediately or report to ED for immediate medical attention for any complications including reopening of wound, drainage, purulence, redness, streaking,  odor, pain out of proportion, fever, chills, etc.       ACTIVITY:   - light  -range of motion as tolerated posterior hip precautions   - WBAT     -PT/OT, [facility] , Patient is responsible to establish and continue care      PAIN MEDICATION:   - Multimodal pain control  - Pain medication: refill was not needed  - Pain medication refill policy provided to patient for review, yes.    - Patient was informed a multi-modal approach is used to treat their pain. With the goal to get off of narcotic pain medication and discontinue as soon as possible.   - ice and elevation to reduce pain and swelling     DVT PROPHYLAXIS:   - Eliquis    OTHER:   Order AFO for foot drop     FOLLOW UP:   - Patient will follow up in the clinic in 6 weeks, sooner if any concerns.  - X-ray of his hip is needed.        If there are any questions prior to scheduled follow up, the patient was instructed to contact the office

## 2024-05-28 ENCOUNTER — TELEPHONE (OUTPATIENT)
Dept: ORTHOPEDICS | Facility: CLINIC | Age: 74
End: 2024-05-28
Payer: MEDICARE

## 2024-05-28 NOTE — TELEPHONE ENCOUNTER
----- Message from Marisela Gould sent at 5/28/2024  1:08 PM CDT -----  Regarding: [facility]  Contact: Pt @[phone]  Pt is calling to provide the fax number [facility] the [facility] . Fax number is  [phone]..Thanks

## 2024-05-28 NOTE — TELEPHONE ENCOUNTER
----- Message from Olga Acevedo sent at 5/28/2024  3:07 PM CDT -----  Regarding: Orders  Contact: mei [phone]  [facility] is [facility] @ fax number  [phone], please call pt @[phone] if needed

## 2024-06-13 ENCOUNTER — TELEPHONE (OUTPATIENT)
Dept: ORTHOPEDICS | Facility: CLINIC | Age: 74
End: 2024-06-13
Payer: MEDICARE

## 2024-06-13 NOTE — TELEPHONE ENCOUNTER
----- Message from Olga Fernandezey sent at 6/13/2024  2:43 PM CDT -----  Regarding: Orders  Contact: Brooklyn [phone]  Brooklyn mack/[facility] is calling to request plan of care or post op precautions from hip surgery, weight bearing status is as tolerated (WBAT), please fax to [phone] attn: [place]/[phone] ext [phone]

## 2024-06-14 ENCOUNTER — TELEPHONE (OUTPATIENT)
Dept: ORTHOPEDICS | Facility: CLINIC | Age: 74
End: 2024-06-14
Payer: MEDICARE

## 2024-06-14 NOTE — TELEPHONE ENCOUNTER
----- Message from Olga Acevedo sent at 6/14/2024  9:22 AM CDT -----  Regarding: Orders  Contact: [place] [phone]  Missed Callback    Brooklyn Reyes is returning callback from missed call. Requesting to speak with staff in Dr. Sanders's office. Please call Brooklyn [phone]

## 2024-07-08 ENCOUNTER — OFFICE VISIT (OUTPATIENT)
Dept: ORTHOPEDICS | Facility: CLINIC | Age: 74
End: 2024-07-08
Payer: MEDICARE

## 2024-07-08 ENCOUNTER — HOSPITAL ENCOUNTER (OUTPATIENT)
Dept: RADIOLOGY | Facility: HOSPITAL | Age: 74
Discharge: HOME | End: 2024-07-08
Attending: PHYSICIAN ASSISTANT
Payer: MEDICARE

## 2024-07-08 DIAGNOSIS — M97.01XD PERIPROSTHETIC FRACTURE AROUND INTERNAL PROSTHETIC RIGHT HIP JOINT, SUBSEQUENT ENCOUNTER: Primary | ICD-10-CM

## 2024-07-08 DIAGNOSIS — M97.01XD PERIPROSTHETIC FRACTURE AROUND INTERNAL PROSTHETIC RIGHT HIP JOINT, SUBSEQUENT ENCOUNTER: ICD-10-CM

## 2024-07-08 PROCEDURE — 73502 X-RAY EXAM HIP UNI 2-3 VIEWS: CPT | Mod: TC,RT

## 2024-07-08 PROCEDURE — 73502 X-RAY EXAM HIP UNI 2-3 VIEWS: CPT | Mod: 26,RT,, | Performed by: RADIOLOGY

## 2024-07-08 PROCEDURE — 99024 POSTOP FOLLOW-UP VISIT: CPT | Mod: POP,,, | Performed by: PHYSICIAN ASSISTANT

## 2024-07-08 NOTE — PROGRESS NOTES
Principal Orthopedic Problem: Right [place] B1 periprosthetic intertrochanteric femur fracture around total hip arthroplasty     04/11/24: : Open reduction internal fixation right periprosthetic intertrochanteric femur fracture     Mr. Chavis is here today for a post-operative visit    Interval History:  he reports that he is doing well.   he is at home.   he is  [facility]  Using cane and AFO.   Repots some crunching to lateral hip. And some lateral hip pain.   Pain is controlled.  he is  taking pain medication.    he denies fever, chills, and sweats .     Physical exam:    Patient arrives to exam room: [facility].  Patient is  accompanied    Full range of motion knee, drop foot on right, moderate swelling no pain to calf.     RADS: All pertinent images were reviewed by myself:   Right hip arthroplasty and internal fixation of the proximal right femur with side plate similar to the previous study. The position alignment is satisfactory and unchanged as compared to the previous examination. No acute fracture or dislocation. No bone destruction identified       Assessment:  Post-op visit ( 12 weeks)    Plan:  Current care, treatment plan, precautions, activity level/ modifications, limitations, rehabilitation exercises and proposed future treatment were discussed with the patient. We discussed the need to monitor for changes in symptoms and condition and report them to the physician.  Discussed importance of compliance with all appointments and follow up examinations.     WOUND CARE :  -  he may wash the area with antibacterial soap in the shower. Will not submerge until the incision is completely healed  -Patient was advised to monitor wound closely and multiple times daily for any problems. Call clinic immediately or report to ED for immediate medical attention for any complications including reopening of wound, drainage, purulence, redness, streaking, odor, pain out of  proportion, fever, chills, etc.       ACTIVITY:   - light  -range of motion as tolerated posterior hip precautions   - WBAT     -PT/OT, [facility] , Patient is responsible to establish and continue care      PAIN MEDICATION:   - Multimodal pain control  - Pain medication: refill was not needed  - Pain medication refill policy provided to patient for review, yes.    - Patient was informed a multi-modal approach is used to treat their pain. With the goal to get off of narcotic pain medication and discontinue as soon as possible.   - ice and elevation to reduce pain and swelling     DVT PROPHYLAXIS:   - Eliquis    FOLLOW UP:   - Patient will follow up in the clinic in 12 weeks, sooner if any concerns.  - X-ray of his hip is needed.        If there are any questions prior to scheduled follow up, the patient was instructed to contact the office

## 2024-10-08 ENCOUNTER — HOSPITAL ENCOUNTER (OUTPATIENT)
Dept: RADIOLOGY | Facility: HOSPITAL | Age: 74
Discharge: HOME | End: 2024-10-08
Attending: PHYSICIAN ASSISTANT
Payer: MEDICARE

## 2024-10-08 ENCOUNTER — OFFICE VISIT (OUTPATIENT)
Dept: ORTHOPEDICS | Facility: CLINIC | Age: 74
End: 2024-10-08
Payer: MEDICARE

## 2024-10-08 VITALS — BODY MASS INDEX: 33.74 KG/M2 | WEIGHT: 214.94 LBS | HEIGHT: 67 IN

## 2024-10-08 DIAGNOSIS — M97.01XD PERIPROSTHETIC FRACTURE AROUND INTERNAL PROSTHETIC RIGHT HIP JOINT, SUBSEQUENT ENCOUNTER: ICD-10-CM

## 2024-10-08 DIAGNOSIS — M97.01XD PERIPROSTHETIC FRACTURE AROUND INTERNAL PROSTHETIC RIGHT HIP JOINT, SUBSEQUENT ENCOUNTER: Primary | ICD-10-CM

## 2024-10-08 PROCEDURE — 99213 OFFICE O/P EST LOW 20 MIN: CPT | Mod: S$PBB,,, | Performed by: PHYSICIAN ASSISTANT

## 2024-10-08 PROCEDURE — 73502 X-RAY EXAM HIP UNI 2-3 VIEWS: CPT | Mod: 26,RT,, | Performed by: INTERNAL MEDICINE

## 2024-10-08 PROCEDURE — 99999 PR PBB SHADOW E&M-EST. PATIENT-LVL III: CPT | Mod: PBBFAC,,, | Performed by: PHYSICIAN ASSISTANT

## 2024-10-08 PROCEDURE — 73502 X-RAY EXAM HIP UNI 2-3 VIEWS: CPT | Mod: TC,RT

## 2024-10-08 PROCEDURE — 99213 OFFICE O/P EST LOW 20 MIN: CPT | Mod: PBBFAC,25 | Performed by: PHYSICIAN ASSISTANT

## 2024-11-19 NOTE — PROGRESS NOTES
Principal Orthopedic Problem: Right [place] B1 periprosthetic intertrochanteric femur fracture around total hip arthroplasty     04/11/24: : Open reduction internal fixation right periprosthetic intertrochanteric femur fracture     Mr. Chavis is here today for a post-operative visit    Interval History:  he reports that he is doing well.   he is at home.   he is  [facility]  Using cane and AFO.   Repots some crunching to lateral hip. And some lateral hip pain.   Pain is controlled.  he is  taking pain medication.    he denies fever, chills, and sweats .     Physical exam:    Patient arrives to exam room: [facility].  Patient is  accompanied    Full range of motion knee, drop foot on right, moderate swelling no pain to calf.     RADS: All pertinent images were reviewed by myself:   Right hip arthroplasty and internal fixation of the proximal right femur with side plate similar to the previous study. The position alignment is satisfactory and unchanged as compared to the previous examination. No acute fracture or dislocation. No bone destruction identified       Assessment:  Post-op visit ( 12 weeks)    Plan:  Current care, treatment plan, precautions, activity level/ modifications, limitations, rehabilitation exercises and proposed future treatment were discussed with the patient. We discussed the need to monitor for changes in symptoms and condition and report them to the physician.  Discussed importance of compliance with all appointments and follow up examinations.       ACTIVITY:   - light  -range of motion as tolerated posterior hip precautions   - WBAT     -PT/OT, [facility] , Patient is responsible to establish and continue care      PAIN MEDICATION:   -OTC as needed .   - ice and elevation to reduce pain and swelling       FOLLOW UP:   - Patient will follow up in the clinic 1 year post op , sooner if any concerns.  - X-ray of his hip is needed.        If there are any  questions prior to scheduled follow up, the patient was instructed to contact the office

## (undated) DEVICE — DRAPE STERI INSTRUMENT 1018

## (undated) DEVICE — KIT IRR SUCTION HND PIECE

## (undated) DEVICE — KIT TOTAL HIP HPOFH OMC

## (undated) DEVICE — SUT STRATAFIX SPRL PS-2 3-0

## (undated) DEVICE — SUT VICRYL BR 1 GEN 27 CT-1

## (undated) DEVICE — SUT VICRYL PLUS 0 CT1 18IN

## (undated) DEVICE — SYS CLSR DERMABOND PRINEO 22CM

## (undated) DEVICE — HOOD T7 W/ PEEL AWAY LENS

## (undated) DEVICE — APPLICATOR CHLORAPREP ORN 26ML

## (undated) DEVICE — SEALER AQUAMANTYS 3.48MM

## (undated) DEVICE — SPONGE COTTON TRAY 4X4IN

## (undated) DEVICE — SUT ETHIBOND XTRA2 OS-4 30

## (undated) DEVICE — ELECTRODE REM PLYHSV RETURN 9

## (undated) DEVICE — SUT VICRYL PLUS 2-0 CT1 18

## (undated) DEVICE — DRESSING AQUACEL RIBBON 2X45CM

## (undated) DEVICE — TAPE SURG DURAPORE 2 X10YD

## (undated) DEVICE — SUT 2-0 VICRYL / SH (J417)

## (undated) DEVICE — SUT FIBERWIRE #5

## (undated) DEVICE — DRAPE THREE-QTR REINF 53X77IN

## (undated) DEVICE — SUT VICRYL+ 1 CT1 18IN

## (undated) DEVICE — SOL IRR NACL .9% 3000ML

## (undated) DEVICE — SUT ETHIBOND XTRA 1 OS-6

## (undated) DEVICE — GOWN AERO CHROME W/ TOWEL XL

## (undated) DEVICE — DRAPE INCISE IOBAN 2 23X17IN